# Patient Record
Sex: FEMALE | Race: WHITE | Employment: OTHER | URBAN - METROPOLITAN AREA
[De-identification: names, ages, dates, MRNs, and addresses within clinical notes are randomized per-mention and may not be internally consistent; named-entity substitution may affect disease eponyms.]

---

## 2017-11-06 LAB — PHQ9 SCORE: 4

## 2017-12-07 LAB — PHQ9 SCORE: 6

## 2018-09-10 ENCOUNTER — HOSPITAL ENCOUNTER (OUTPATIENT)
Dept: LAB | Facility: CLINIC | Age: 58
Discharge: HOME | End: 2018-09-10
Attending: FAMILY MEDICINE | Admitting: FAMILY MEDICINE
Payer: MEDICARE

## 2018-09-10 LAB
ABO + RH BLD: NORMAL
ABO + RH BLD: NORMAL
BLD GP AB SCN SERPL QL: NORMAL
BLOOD BANK CMNT PATIENT-IMP: NORMAL
BLOOD BANK CMNT PATIENT-IMP: NORMAL
SPECIMEN EXP DATE BLD: NORMAL

## 2018-09-10 PROCEDURE — 36415 COLL VENOUS BLD VENIPUNCTURE: CPT | Performed by: FAMILY MEDICINE

## 2018-09-10 PROCEDURE — 86900 BLOOD TYPING SEROLOGIC ABO: CPT | Performed by: FAMILY MEDICINE

## 2018-09-10 PROCEDURE — 86901 BLOOD TYPING SEROLOGIC RH(D): CPT | Performed by: FAMILY MEDICINE

## 2018-09-10 PROCEDURE — 86850 RBC ANTIBODY SCREEN: CPT | Performed by: FAMILY MEDICINE

## 2018-09-20 RX ORDER — LEVOTHYROXINE SODIUM 88 UG/1
88 TABLET ORAL DAILY
COMMUNITY

## 2018-09-20 RX ORDER — FAMOTIDINE 20 MG
1000 TABLET ORAL DAILY
COMMUNITY

## 2018-09-20 RX ORDER — GABAPENTIN 300 MG/1
300 CAPSULE ORAL 2 TIMES DAILY
COMMUNITY
End: 2019-11-18

## 2018-09-20 RX ORDER — CITALOPRAM HYDROBROMIDE 20 MG/1
20 TABLET ORAL EVERY EVENING
COMMUNITY

## 2018-09-20 RX ORDER — OXYBUTYNIN CHLORIDE 15 MG/1
15 TABLET, EXTENDED RELEASE ORAL EVERY MORNING
COMMUNITY

## 2018-09-20 RX ORDER — LAMOTRIGINE 200 MG/1
100 TABLET ORAL DAILY
COMMUNITY

## 2018-09-20 RX ORDER — DIVALPROEX SODIUM 500 MG/1
500 TABLET, DELAYED RELEASE ORAL AT BEDTIME
COMMUNITY

## 2018-09-20 RX ORDER — QUETIAPINE FUMARATE 100 MG/1
50 TABLET, FILM COATED ORAL AT BEDTIME
COMMUNITY

## 2018-09-20 RX ORDER — LOVASTATIN 40 MG
40 TABLET ORAL AT BEDTIME
COMMUNITY

## 2018-09-20 RX ORDER — THIOTHIXENE 1 MG/1
5 CAPSULE ORAL AT BEDTIME
COMMUNITY

## 2018-09-20 NOTE — OR NURSING
I called pt with the pre-op call and she was very concerned re: insurance coverage.. I was unsuccessful  Connecting  to [facility] financial services x 2 (busy signal) I called Dr Bonilla office and spoke with Pinyk?, she will follow up with the pt.

## 2018-09-21 ENCOUNTER — ANESTHESIA EVENT (OUTPATIENT)
Dept: SURGERY | Facility: CLINIC | Age: 58
DRG: 164 | End: 2018-09-21
Payer: MEDICARE

## 2018-09-21 ENCOUNTER — HOSPITAL ENCOUNTER (INPATIENT)
Facility: CLINIC | Age: 58
LOS: 2 days | Discharge: HOME | DRG: 164 | End: 2018-09-23
Attending: THORACIC SURGERY (CARDIOTHORACIC VASCULAR SURGERY) | Admitting: THORACIC SURGERY (CARDIOTHORACIC VASCULAR SURGERY)
Payer: MEDICARE

## 2018-09-21 ENCOUNTER — ANESTHESIA (OUTPATIENT)
Dept: SURGERY | Facility: CLINIC | Age: 58
DRG: 164 | End: 2018-09-21
Payer: MEDICARE

## 2018-09-21 ENCOUNTER — APPOINTMENT (OUTPATIENT)
Dept: GENERAL RADIOLOGY | Facility: CLINIC | Age: 58
DRG: 164 | End: 2018-09-21
Attending: THORACIC SURGERY (CARDIOTHORACIC VASCULAR SURGERY)
Payer: MEDICARE

## 2018-09-21 DIAGNOSIS — R91.1 NODULE OF LEFT LUNG: Primary | ICD-10-CM

## 2018-09-21 LAB
ABO + RH BLD: NORMAL
ABO + RH BLD: NORMAL
ANION GAP SERPL CALCULATED.3IONS-SCNC: 6 MMOL/L (ref 3–14)
BLD GP AB SCN SERPL QL: NORMAL
BLOOD BANK CMNT PATIENT-IMP: NORMAL
BUN SERPL-MCNC: 6 MG/DL (ref 7–30)
CALCIUM SERPL-MCNC: 8.9 MG/DL (ref 8.5–10.1)
CHLORIDE SERPL-SCNC: 107 MMOL/L (ref 94–109)
CO2 SERPL-SCNC: 28 MMOL/L (ref 20–32)
CREAT SERPL-MCNC: 0.71 MG/DL (ref 0.52–1.04)
ERYTHROCYTE [DISTWIDTH] IN BLOOD BY AUTOMATED COUNT: 13.1 % (ref 10–15)
GFR SERPL CREATININE-BSD FRML MDRD: 84 ML/MIN/1.7M2
GLUCOSE SERPL-MCNC: 93 MG/DL (ref 70–99)
HCT VFR BLD AUTO: 40.5 % (ref 35–47)
HGB BLD-MCNC: 13.7 G/DL (ref 11.7–15.7)
INR PPP: 0.95 (ref 0.86–1.14)
MCH RBC QN AUTO: 34.4 PG (ref 26.5–33)
MCHC RBC AUTO-ENTMCNC: 33.8 G/DL (ref 31.5–36.5)
MCV RBC AUTO: 102 FL (ref 78–100)
PLATELET # BLD AUTO: 284 10E9/L (ref 150–450)
POTASSIUM SERPL-SCNC: 4.2 MMOL/L (ref 3.4–5.3)
RBC # BLD AUTO: 3.98 10E12/L (ref 3.8–5.2)
SODIUM SERPL-SCNC: 141 MMOL/L (ref 133–144)
SPECIMEN EXP DATE BLD: NORMAL
WBC # BLD AUTO: 8.1 10E9/L (ref 4–11)

## 2018-09-21 PROCEDURE — 88331 PATH CONSLTJ SURG 1 BLK 1SPC: CPT | Mod: 26 | Performed by: THORACIC SURGERY (CARDIOTHORACIC VASCULAR SURGERY)

## 2018-09-21 PROCEDURE — 85027 COMPLETE CBC AUTOMATED: CPT | Performed by: THORACIC SURGERY (CARDIOTHORACIC VASCULAR SURGERY)

## 2018-09-21 PROCEDURE — 07B70ZX EXCISION OF THORAX LYMPHATIC, OPEN APPROACH, DIAGNOSTIC: ICD-10-PCS | Performed by: THORACIC SURGERY (CARDIOTHORACIC VASCULAR SURGERY)

## 2018-09-21 PROCEDURE — 40000886 ZZH STATISTIC STEP DOWN HRS DAY

## 2018-09-21 PROCEDURE — A9270 NON-COVERED ITEM OR SERVICE: HCPCS | Mod: GY | Performed by: PHYSICIAN ASSISTANT

## 2018-09-21 PROCEDURE — 25000128 H RX IP 250 OP 636: Performed by: ANESTHESIOLOGY

## 2018-09-21 PROCEDURE — 25000128 H RX IP 250 OP 636: Performed by: NURSE ANESTHETIST, CERTIFIED REGISTERED

## 2018-09-21 PROCEDURE — 36000063 ZZH SURGERY LEVEL 4 EA 15 ADDTL MIN: Performed by: THORACIC SURGERY (CARDIOTHORACIC VASCULAR SURGERY)

## 2018-09-21 PROCEDURE — 86850 RBC ANTIBODY SCREEN: CPT | Performed by: THORACIC SURGERY (CARDIOTHORACIC VASCULAR SURGERY)

## 2018-09-21 PROCEDURE — A9270 NON-COVERED ITEM OR SERVICE: HCPCS | Mod: GY | Performed by: NURSE ANESTHETIST, CERTIFIED REGISTERED

## 2018-09-21 PROCEDURE — 88307 TISSUE EXAM BY PATHOLOGIST: CPT | Performed by: THORACIC SURGERY (CARDIOTHORACIC VASCULAR SURGERY)

## 2018-09-21 PROCEDURE — 40000986 XR CHEST PORT 1 VW

## 2018-09-21 PROCEDURE — 82565 ASSAY OF CREATININE: CPT | Performed by: PHYSICIAN ASSISTANT

## 2018-09-21 PROCEDURE — 25000128 H RX IP 250 OP 636: Performed by: THORACIC SURGERY (CARDIOTHORACIC VASCULAR SURGERY)

## 2018-09-21 PROCEDURE — 88309 TISSUE EXAM BY PATHOLOGIST: CPT | Performed by: THORACIC SURGERY (CARDIOTHORACIC VASCULAR SURGERY)

## 2018-09-21 PROCEDURE — 36415 COLL VENOUS BLD VENIPUNCTURE: CPT | Performed by: THORACIC SURGERY (CARDIOTHORACIC VASCULAR SURGERY)

## 2018-09-21 PROCEDURE — 86900 BLOOD TYPING SEROLOGIC ABO: CPT | Performed by: THORACIC SURGERY (CARDIOTHORACIC VASCULAR SURGERY)

## 2018-09-21 PROCEDURE — 0BTG0ZZ RESECTION OF LEFT UPPER LUNG LOBE, OPEN APPROACH: ICD-10-PCS | Performed by: THORACIC SURGERY (CARDIOTHORACIC VASCULAR SURGERY)

## 2018-09-21 PROCEDURE — 25000125 ZZHC RX 250: Performed by: NURSE ANESTHETIST, CERTIFIED REGISTERED

## 2018-09-21 PROCEDURE — 12000007 ZZH R&B INTERMEDIATE

## 2018-09-21 PROCEDURE — 99207 ZZC CONSULT E&M CHANGED TO INITIAL LEVEL: CPT | Performed by: PHYSICIAN ASSISTANT

## 2018-09-21 PROCEDURE — 40000885 ZZH STATISTIC STEP DOWN HRS EVENING

## 2018-09-21 PROCEDURE — 37000009 ZZH ANESTHESIA TECHNICAL FEE, EACH ADDTL 15 MIN: Performed by: THORACIC SURGERY (CARDIOTHORACIC VASCULAR SURGERY)

## 2018-09-21 PROCEDURE — 80048 BASIC METABOLIC PNL TOTAL CA: CPT | Performed by: THORACIC SURGERY (CARDIOTHORACIC VASCULAR SURGERY)

## 2018-09-21 PROCEDURE — 25000128 H RX IP 250 OP 636: Performed by: PHYSICIAN ASSISTANT

## 2018-09-21 PROCEDURE — 88305 TISSUE EXAM BY PATHOLOGIST: CPT | Performed by: THORACIC SURGERY (CARDIOTHORACIC VASCULAR SURGERY)

## 2018-09-21 PROCEDURE — 27210794 ZZH OR GENERAL SUPPLY STERILE: Performed by: THORACIC SURGERY (CARDIOTHORACIC VASCULAR SURGERY)

## 2018-09-21 PROCEDURE — 99222 1ST HOSP IP/OBS MODERATE 55: CPT | Performed by: PHYSICIAN ASSISTANT

## 2018-09-21 PROCEDURE — 88307 TISSUE EXAM BY PATHOLOGIST: CPT | Mod: 26 | Performed by: THORACIC SURGERY (CARDIOTHORACIC VASCULAR SURGERY)

## 2018-09-21 PROCEDURE — 25000125 ZZHC RX 250: Performed by: PHYSICIAN ASSISTANT

## 2018-09-21 PROCEDURE — 88309 TISSUE EXAM BY PATHOLOGIST: CPT | Mod: 26 | Performed by: THORACIC SURGERY (CARDIOTHORACIC VASCULAR SURGERY)

## 2018-09-21 PROCEDURE — 36000093 ZZH SURGERY LEVEL 4 1ST 30 MIN: Performed by: THORACIC SURGERY (CARDIOTHORACIC VASCULAR SURGERY)

## 2018-09-21 PROCEDURE — 25000132 ZZH RX MED GY IP 250 OP 250 PS 637: Mod: GY | Performed by: PHYSICIAN ASSISTANT

## 2018-09-21 PROCEDURE — 25000132 ZZH RX MED GY IP 250 OP 250 PS 637: Mod: GY | Performed by: NURSE ANESTHETIST, CERTIFIED REGISTERED

## 2018-09-21 PROCEDURE — 25000566 ZZH SEVOFLURANE, EA 15 MIN: Performed by: THORACIC SURGERY (CARDIOTHORACIC VASCULAR SURGERY)

## 2018-09-21 PROCEDURE — 27110038 ZZH RX 271: Performed by: THORACIC SURGERY (CARDIOTHORACIC VASCULAR SURGERY)

## 2018-09-21 PROCEDURE — 86901 BLOOD TYPING SEROLOGIC RH(D): CPT | Performed by: THORACIC SURGERY (CARDIOTHORACIC VASCULAR SURGERY)

## 2018-09-21 PROCEDURE — 37000008 ZZH ANESTHESIA TECHNICAL FEE, 1ST 30 MIN: Performed by: THORACIC SURGERY (CARDIOTHORACIC VASCULAR SURGERY)

## 2018-09-21 PROCEDURE — 40000170 ZZH STATISTIC PRE-PROCEDURE ASSESSMENT II: Performed by: THORACIC SURGERY (CARDIOTHORACIC VASCULAR SURGERY)

## 2018-09-21 PROCEDURE — 71000013 ZZH RECOVERY PHASE 1 LEVEL 1 EA ADDTL HR: Performed by: THORACIC SURGERY (CARDIOTHORACIC VASCULAR SURGERY)

## 2018-09-21 PROCEDURE — 85610 PROTHROMBIN TIME: CPT | Performed by: THORACIC SURGERY (CARDIOTHORACIC VASCULAR SURGERY)

## 2018-09-21 PROCEDURE — 25000125 ZZHC RX 250: Performed by: THORACIC SURGERY (CARDIOTHORACIC VASCULAR SURGERY)

## 2018-09-21 PROCEDURE — 71000012 ZZH RECOVERY PHASE 1 LEVEL 1 FIRST HR: Performed by: THORACIC SURGERY (CARDIOTHORACIC VASCULAR SURGERY)

## 2018-09-21 PROCEDURE — 88305 TISSUE EXAM BY PATHOLOGIST: CPT | Mod: 26 | Performed by: THORACIC SURGERY (CARDIOTHORACIC VASCULAR SURGERY)

## 2018-09-21 PROCEDURE — 88331 PATH CONSLTJ SURG 1 BLK 1SPC: CPT | Performed by: THORACIC SURGERY (CARDIOTHORACIC VASCULAR SURGERY)

## 2018-09-21 RX ORDER — CITALOPRAM HYDROBROMIDE 20 MG/1
20 TABLET ORAL EVERY EVENING
Status: DISCONTINUED | OUTPATIENT
Start: 2018-09-21 | End: 2018-09-23 | Stop reason: HOSPADM

## 2018-09-21 RX ORDER — GABAPENTIN 300 MG/1
300 CAPSULE ORAL 2 TIMES DAILY
Status: DISCONTINUED | OUTPATIENT
Start: 2018-09-21 | End: 2018-09-23 | Stop reason: HOSPADM

## 2018-09-21 RX ORDER — LIDOCAINE HYDROCHLORIDE 20 MG/ML
INJECTION, SOLUTION INFILTRATION; PERINEURAL PRN
Status: DISCONTINUED | OUTPATIENT
Start: 2018-09-21 | End: 2018-09-21

## 2018-09-21 RX ORDER — DEXAMETHASONE SODIUM PHOSPHATE 4 MG/ML
INJECTION, SOLUTION INTRA-ARTICULAR; INTRALESIONAL; INTRAMUSCULAR; INTRAVENOUS; SOFT TISSUE PRN
Status: DISCONTINUED | OUTPATIENT
Start: 2018-09-21 | End: 2018-09-21

## 2018-09-21 RX ORDER — CEFAZOLIN SODIUM 2 G/100ML
2 INJECTION, SOLUTION INTRAVENOUS
Status: COMPLETED | OUTPATIENT
Start: 2018-09-21 | End: 2018-09-21

## 2018-09-21 RX ORDER — LIDOCAINE 40 MG/G
CREAM TOPICAL
Status: DISCONTINUED | OUTPATIENT
Start: 2018-09-21 | End: 2018-09-23 | Stop reason: HOSPADM

## 2018-09-21 RX ORDER — FENTANYL CITRATE 50 UG/ML
25-50 INJECTION, SOLUTION INTRAMUSCULAR; INTRAVENOUS
Status: DISCONTINUED | OUTPATIENT
Start: 2018-09-21 | End: 2018-09-21 | Stop reason: HOSPADM

## 2018-09-21 RX ORDER — NEOSTIGMINE METHYLSULFATE 1 MG/ML
VIAL (ML) INJECTION PRN
Status: DISCONTINUED | OUTPATIENT
Start: 2018-09-21 | End: 2018-09-21

## 2018-09-21 RX ORDER — ONDANSETRON 4 MG/1
4 TABLET, ORALLY DISINTEGRATING ORAL EVERY 30 MIN PRN
Status: DISCONTINUED | OUTPATIENT
Start: 2018-09-21 | End: 2018-09-21 | Stop reason: HOSPADM

## 2018-09-21 RX ORDER — DIVALPROEX SODIUM 125 MG/1
500 CAPSULE, COATED PELLETS ORAL AT BEDTIME
Status: DISCONTINUED | OUTPATIENT
Start: 2018-09-21 | End: 2018-09-23 | Stop reason: HOSPADM

## 2018-09-21 RX ORDER — PROCHLORPERAZINE MALEATE 10 MG
10 TABLET ORAL EVERY 6 HOURS PRN
Status: DISCONTINUED | OUTPATIENT
Start: 2018-09-21 | End: 2018-09-23 | Stop reason: HOSPADM

## 2018-09-21 RX ORDER — SODIUM CHLORIDE, SODIUM LACTATE, POTASSIUM CHLORIDE, CALCIUM CHLORIDE 600; 310; 30; 20 MG/100ML; MG/100ML; MG/100ML; MG/100ML
INJECTION, SOLUTION INTRAVENOUS CONTINUOUS
Status: DISCONTINUED | OUTPATIENT
Start: 2018-09-21 | End: 2018-09-21 | Stop reason: HOSPADM

## 2018-09-21 RX ORDER — BUPIVACAINE HYDROCHLORIDE 5 MG/ML
INJECTION, SOLUTION PERINEURAL PRN
Status: DISCONTINUED | OUTPATIENT
Start: 2018-09-21 | End: 2018-09-21 | Stop reason: HOSPADM

## 2018-09-21 RX ORDER — GLYCOPYRROLATE 0.2 MG/ML
INJECTION, SOLUTION INTRAMUSCULAR; INTRAVENOUS PRN
Status: DISCONTINUED | OUTPATIENT
Start: 2018-09-21 | End: 2018-09-21

## 2018-09-21 RX ORDER — SODIUM CHLORIDE, SODIUM LACTATE, POTASSIUM CHLORIDE, CALCIUM CHLORIDE 600; 310; 30; 20 MG/100ML; MG/100ML; MG/100ML; MG/100ML
INJECTION, SOLUTION INTRAVENOUS CONTINUOUS PRN
Status: DISCONTINUED | OUTPATIENT
Start: 2018-09-21 | End: 2018-09-21

## 2018-09-21 RX ORDER — THIOTHIXENE 5 MG/1
5 CAPSULE ORAL AT BEDTIME
Status: DISCONTINUED | OUTPATIENT
Start: 2018-09-21 | End: 2018-09-23 | Stop reason: HOSPADM

## 2018-09-21 RX ORDER — MAGNESIUM HYDROXIDE 1200 MG/15ML
LIQUID ORAL PRN
Status: DISCONTINUED | OUTPATIENT
Start: 2018-09-21 | End: 2018-09-21 | Stop reason: HOSPADM

## 2018-09-21 RX ORDER — AMOXICILLIN 250 MG
2 CAPSULE ORAL 2 TIMES DAILY
Status: DISCONTINUED | OUTPATIENT
Start: 2018-09-21 | End: 2018-09-23 | Stop reason: HOSPADM

## 2018-09-21 RX ORDER — ONDANSETRON 2 MG/ML
INJECTION INTRAMUSCULAR; INTRAVENOUS PRN
Status: DISCONTINUED | OUTPATIENT
Start: 2018-09-21 | End: 2018-09-21

## 2018-09-21 RX ORDER — ACETAMINOPHEN 325 MG/1
975 TABLET ORAL EVERY 8 HOURS
Status: DISCONTINUED | OUTPATIENT
Start: 2018-09-21 | End: 2018-09-23 | Stop reason: HOSPADM

## 2018-09-21 RX ORDER — POLYETHYLENE GLYCOL 3350 17 G/17G
17 POWDER, FOR SOLUTION ORAL DAILY PRN
Status: DISCONTINUED | OUTPATIENT
Start: 2018-09-21 | End: 2018-09-23 | Stop reason: HOSPADM

## 2018-09-21 RX ORDER — HYDROMORPHONE HYDROCHLORIDE 2 MG/1
2-4 TABLET ORAL
Status: DISCONTINUED | OUTPATIENT
Start: 2018-09-21 | End: 2018-09-23 | Stop reason: HOSPADM

## 2018-09-21 RX ORDER — HYDROMORPHONE HYDROCHLORIDE 1 MG/ML
.3-.5 INJECTION, SOLUTION INTRAMUSCULAR; INTRAVENOUS; SUBCUTANEOUS EVERY 5 MIN PRN
Status: DISCONTINUED | OUTPATIENT
Start: 2018-09-21 | End: 2018-09-21 | Stop reason: HOSPADM

## 2018-09-21 RX ORDER — AMOXICILLIN 250 MG
1 CAPSULE ORAL 2 TIMES DAILY PRN
Status: DISCONTINUED | OUTPATIENT
Start: 2018-09-21 | End: 2018-09-23 | Stop reason: HOSPADM

## 2018-09-21 RX ORDER — DEXTROSE, SODIUM CHLORIDE, SODIUM LACTATE, POTASSIUM CHLORIDE, AND CALCIUM CHLORIDE 5; .6; .31; .03; .02 G/100ML; G/100ML; G/100ML; G/100ML; G/100ML
1000 INJECTION, SOLUTION INTRAVENOUS CONTINUOUS
Status: DISCONTINUED | OUTPATIENT
Start: 2018-09-21 | End: 2018-09-21 | Stop reason: ALTCHOICE

## 2018-09-21 RX ORDER — NITROGLYCERIN 0.4 MG/1
0.4 TABLET SUBLINGUAL EVERY 5 MIN PRN
Status: DISCONTINUED | OUTPATIENT
Start: 2018-09-21 | End: 2018-09-23 | Stop reason: HOSPADM

## 2018-09-21 RX ORDER — NALOXONE HYDROCHLORIDE 0.4 MG/ML
.1-.4 INJECTION, SOLUTION INTRAMUSCULAR; INTRAVENOUS; SUBCUTANEOUS
Status: DISCONTINUED | OUTPATIENT
Start: 2018-09-21 | End: 2018-09-21

## 2018-09-21 RX ORDER — FENTANYL CITRATE 50 UG/ML
INJECTION, SOLUTION INTRAMUSCULAR; INTRAVENOUS PRN
Status: DISCONTINUED | OUTPATIENT
Start: 2018-09-21 | End: 2018-09-21

## 2018-09-21 RX ORDER — ALBUTEROL SULFATE 90 UG/1
AEROSOL, METERED RESPIRATORY (INHALATION) PRN
Status: DISCONTINUED | OUTPATIENT
Start: 2018-09-21 | End: 2018-09-21

## 2018-09-21 RX ORDER — PROPOFOL 10 MG/ML
INJECTION, EMULSION INTRAVENOUS PRN
Status: DISCONTINUED | OUTPATIENT
Start: 2018-09-21 | End: 2018-09-21

## 2018-09-21 RX ORDER — DIVALPROEX SODIUM 500 MG/1
500 TABLET, DELAYED RELEASE ORAL AT BEDTIME
Status: DISCONTINUED | OUTPATIENT
Start: 2018-09-21 | End: 2018-09-21

## 2018-09-21 RX ORDER — QUETIAPINE FUMARATE 50 MG/1
50 TABLET, FILM COATED ORAL AT BEDTIME
Status: DISCONTINUED | OUTPATIENT
Start: 2018-09-21 | End: 2018-09-23 | Stop reason: HOSPADM

## 2018-09-21 RX ORDER — LEVOTHYROXINE SODIUM 88 UG/1
88 TABLET ORAL DAILY
Status: DISCONTINUED | OUTPATIENT
Start: 2018-09-21 | End: 2018-09-23 | Stop reason: HOSPADM

## 2018-09-21 RX ORDER — LOVASTATIN 20 MG
40 TABLET ORAL AT BEDTIME
Status: DISCONTINUED | OUTPATIENT
Start: 2018-09-21 | End: 2018-09-23 | Stop reason: HOSPADM

## 2018-09-21 RX ORDER — KETOROLAC TROMETHAMINE 30 MG/ML
30 INJECTION, SOLUTION INTRAMUSCULAR; INTRAVENOUS EVERY 6 HOURS
Status: DISPENSED | OUTPATIENT
Start: 2018-09-21 | End: 2018-09-22

## 2018-09-21 RX ORDER — ACETAMINOPHEN 325 MG/1
650 TABLET ORAL EVERY 4 HOURS PRN
Status: DISCONTINUED | OUTPATIENT
Start: 2018-09-24 | End: 2018-09-23 | Stop reason: HOSPADM

## 2018-09-21 RX ORDER — HYDROMORPHONE HYDROCHLORIDE 1 MG/ML
.3-.5 INJECTION, SOLUTION INTRAMUSCULAR; INTRAVENOUS; SUBCUTANEOUS
Status: DISCONTINUED | OUTPATIENT
Start: 2018-09-21 | End: 2018-09-23 | Stop reason: HOSPADM

## 2018-09-21 RX ORDER — CEFAZOLIN SODIUM 1 G/3ML
1 INJECTION, POWDER, FOR SOLUTION INTRAMUSCULAR; INTRAVENOUS SEE ADMIN INSTRUCTIONS
Status: DISCONTINUED | OUTPATIENT
Start: 2018-09-21 | End: 2018-09-21 | Stop reason: HOSPADM

## 2018-09-21 RX ORDER — LAMOTRIGINE 100 MG/1
100 TABLET ORAL DAILY
Status: DISCONTINUED | OUTPATIENT
Start: 2018-09-21 | End: 2018-09-23 | Stop reason: HOSPADM

## 2018-09-21 RX ORDER — DIPHENHYDRAMINE HCL 25 MG
25 CAPSULE ORAL EVERY 6 HOURS PRN
Status: DISCONTINUED | OUTPATIENT
Start: 2018-09-21 | End: 2018-09-23 | Stop reason: HOSPADM

## 2018-09-21 RX ORDER — DIPHENHYDRAMINE HYDROCHLORIDE 50 MG/ML
25 INJECTION INTRAMUSCULAR; INTRAVENOUS EVERY 6 HOURS PRN
Status: DISCONTINUED | OUTPATIENT
Start: 2018-09-21 | End: 2018-09-23 | Stop reason: HOSPADM

## 2018-09-21 RX ORDER — AMOXICILLIN 250 MG
1 CAPSULE ORAL 2 TIMES DAILY
Status: DISCONTINUED | OUTPATIENT
Start: 2018-09-21 | End: 2018-09-23 | Stop reason: HOSPADM

## 2018-09-21 RX ORDER — CALCIUM CARBONATE 500 MG/1
1000 TABLET, CHEWABLE ORAL 4 TIMES DAILY PRN
Status: DISCONTINUED | OUTPATIENT
Start: 2018-09-21 | End: 2018-09-23 | Stop reason: HOSPADM

## 2018-09-21 RX ORDER — ONDANSETRON 2 MG/ML
4 INJECTION INTRAMUSCULAR; INTRAVENOUS EVERY 30 MIN PRN
Status: DISCONTINUED | OUTPATIENT
Start: 2018-09-21 | End: 2018-09-21 | Stop reason: HOSPADM

## 2018-09-21 RX ORDER — BISACODYL 10 MG
10 SUPPOSITORY, RECTAL RECTAL DAILY PRN
Status: DISCONTINUED | OUTPATIENT
Start: 2018-09-21 | End: 2018-09-23 | Stop reason: HOSPADM

## 2018-09-21 RX ORDER — ONDANSETRON 2 MG/ML
4 INJECTION INTRAMUSCULAR; INTRAVENOUS EVERY 6 HOURS PRN
Status: DISCONTINUED | OUTPATIENT
Start: 2018-09-21 | End: 2018-09-23 | Stop reason: HOSPADM

## 2018-09-21 RX ORDER — ONDANSETRON 4 MG/1
4 TABLET, ORALLY DISINTEGRATING ORAL EVERY 6 HOURS PRN
Status: DISCONTINUED | OUTPATIENT
Start: 2018-09-21 | End: 2018-09-23 | Stop reason: HOSPADM

## 2018-09-21 RX ORDER — NALOXONE HYDROCHLORIDE 0.4 MG/ML
.1-.4 INJECTION, SOLUTION INTRAMUSCULAR; INTRAVENOUS; SUBCUTANEOUS
Status: DISCONTINUED | OUTPATIENT
Start: 2018-09-21 | End: 2018-09-23 | Stop reason: HOSPADM

## 2018-09-21 RX ORDER — AMOXICILLIN 250 MG
2 CAPSULE ORAL 2 TIMES DAILY PRN
Status: DISCONTINUED | OUTPATIENT
Start: 2018-09-21 | End: 2018-09-23 | Stop reason: HOSPADM

## 2018-09-21 RX ORDER — GINSENG 100 MG
CAPSULE ORAL 3 TIMES DAILY
Status: DISCONTINUED | OUTPATIENT
Start: 2018-09-21 | End: 2018-09-23 | Stop reason: HOSPADM

## 2018-09-21 RX ORDER — SODIUM CHLORIDE 9 MG/ML
INJECTION, SOLUTION INTRAVENOUS CONTINUOUS
Status: DISCONTINUED | OUTPATIENT
Start: 2018-09-21 | End: 2018-09-22

## 2018-09-21 RX ORDER — VECURONIUM BROMIDE 1 MG/ML
INJECTION, POWDER, LYOPHILIZED, FOR SOLUTION INTRAVENOUS PRN
Status: DISCONTINUED | OUTPATIENT
Start: 2018-09-21 | End: 2018-09-21

## 2018-09-21 RX ADMIN — FENTANYL CITRATE 100 MCG: 50 INJECTION, SOLUTION INTRAMUSCULAR; INTRAVENOUS at 07:35

## 2018-09-21 RX ADMIN — FENTANYL CITRATE 50 MCG: 50 INJECTION, SOLUTION INTRAMUSCULAR; INTRAVENOUS at 07:56

## 2018-09-21 RX ADMIN — GABAPENTIN 300 MG: 300 CAPSULE ORAL at 20:26

## 2018-09-21 RX ADMIN — DIVALPROEX SODIUM 500 MG: 125 CAPSULE, COATED PELLETS ORAL at 21:09

## 2018-09-21 RX ADMIN — ROCURONIUM BROMIDE 10 MG: 10 INJECTION INTRAVENOUS at 08:09

## 2018-09-21 RX ADMIN — LOVASTATIN 40 MG: 20 TABLET ORAL at 21:09

## 2018-09-21 RX ADMIN — ACETAMINOPHEN 975 MG: 325 TABLET, FILM COATED ORAL at 12:36

## 2018-09-21 RX ADMIN — KETOROLAC TROMETHAMINE 30 MG: 30 INJECTION, SOLUTION INTRAMUSCULAR at 16:14

## 2018-09-21 RX ADMIN — FAMOTIDINE 20 MG: 10 INJECTION, SOLUTION INTRAVENOUS at 23:33

## 2018-09-21 RX ADMIN — LIDOCAINE HYDROCHLORIDE 100 MG: 20 INJECTION, SOLUTION INFILTRATION; PERINEURAL at 07:35

## 2018-09-21 RX ADMIN — DEXMEDETOMIDINE HYDROCHLORIDE 8 MCG: 100 INJECTION, SOLUTION INTRAVENOUS at 07:59

## 2018-09-21 RX ADMIN — GLYCOPYRROLATE 0.6 MG: 0.2 INJECTION, SOLUTION INTRAMUSCULAR; INTRAVENOUS at 09:12

## 2018-09-21 RX ADMIN — GABAPENTIN 300 MG: 300 CAPSULE ORAL at 16:14

## 2018-09-21 RX ADMIN — RANITIDINE 150 MG: 150 TABLET ORAL at 16:14

## 2018-09-21 RX ADMIN — MIDAZOLAM 2 MG: 1 INJECTION INTRAMUSCULAR; INTRAVENOUS at 07:28

## 2018-09-21 RX ADMIN — CEFAZOLIN SODIUM 2 G: 2 INJECTION, SOLUTION INTRAVENOUS at 07:42

## 2018-09-21 RX ADMIN — Medication 0.5 MG: at 09:38

## 2018-09-21 RX ADMIN — Medication 0.3 MG: at 12:36

## 2018-09-21 RX ADMIN — VECURONIUM BROMIDE 2 MG: 1 INJECTION, POWDER, LYOPHILIZED, FOR SOLUTION INTRAVENOUS at 08:28

## 2018-09-21 RX ADMIN — FENTANYL CITRATE 50 MCG: 50 INJECTION, SOLUTION INTRAMUSCULAR; INTRAVENOUS at 09:22

## 2018-09-21 RX ADMIN — ROCURONIUM BROMIDE 40 MG: 10 INJECTION INTRAVENOUS at 07:35

## 2018-09-21 RX ADMIN — SODIUM CHLORIDE, POTASSIUM CHLORIDE, SODIUM LACTATE AND CALCIUM CHLORIDE: 600; 310; 30; 20 INJECTION, SOLUTION INTRAVENOUS at 07:41

## 2018-09-21 RX ADMIN — NEOSTIGMINE METHYLSULFATE 4 MG: 1 INJECTION, SOLUTION INTRAVENOUS at 09:12

## 2018-09-21 RX ADMIN — THIOTHIXENE 5 MG: 5 CAPSULE ORAL at 21:09

## 2018-09-21 RX ADMIN — SODIUM CHLORIDE: 9 INJECTION, SOLUTION INTRAVENOUS at 13:56

## 2018-09-21 RX ADMIN — Medication 0.5 MG: at 09:51

## 2018-09-21 RX ADMIN — SODIUM CHLORIDE, POTASSIUM CHLORIDE, SODIUM LACTATE AND CALCIUM CHLORIDE: 600; 310; 30; 20 INJECTION, SOLUTION INTRAVENOUS at 07:10

## 2018-09-21 RX ADMIN — ACETAMINOPHEN 975 MG: 325 TABLET, FILM COATED ORAL at 20:26

## 2018-09-21 RX ADMIN — HYDROMORPHONE HYDROCHLORIDE 0.25 MG: 1 INJECTION, SOLUTION INTRAMUSCULAR; INTRAVENOUS; SUBCUTANEOUS at 09:01

## 2018-09-21 RX ADMIN — PROPOFOL 160 MG: 10 INJECTION, EMULSION INTRAVENOUS at 07:35

## 2018-09-21 RX ADMIN — KETOROLAC TROMETHAMINE 30 MG: 30 INJECTION, SOLUTION INTRAMUSCULAR at 23:32

## 2018-09-21 RX ADMIN — DEXAMETHASONE SODIUM PHOSPHATE 4 MG: 4 INJECTION, SOLUTION INTRA-ARTICULAR; INTRALESIONAL; INTRAMUSCULAR; INTRAVENOUS; SOFT TISSUE at 07:42

## 2018-09-21 RX ADMIN — ONDANSETRON 4 MG: 2 INJECTION INTRAMUSCULAR; INTRAVENOUS at 08:52

## 2018-09-21 RX ADMIN — ALBUTEROL SULFATE 6 PUFF: 90 AEROSOL, METERED RESPIRATORY (INHALATION) at 08:57

## 2018-09-21 RX ADMIN — HYDROMORPHONE HYDROCHLORIDE 0.25 MG: 1 INJECTION, SOLUTION INTRAMUSCULAR; INTRAVENOUS; SUBCUTANEOUS at 09:22

## 2018-09-21 ASSESSMENT — COPD QUESTIONNAIRES: COPD: 1

## 2018-09-21 ASSESSMENT — LIFESTYLE VARIABLES: TOBACCO_USE: 1

## 2018-09-21 ASSESSMENT — ACTIVITIES OF DAILY LIVING (ADL)
ADLS_ACUITY_SCORE: 9
ADLS_ACUITY_SCORE: 10
ADLS_ACUITY_SCORE: 9

## 2018-09-21 NOTE — ANESTHESIA POSTPROCEDURE EVALUATION
Patient: Santa Yousif    Procedure(s):  LEFT THORACOTOMY,  LEFT UPPER LOBECTOMY,Mediastin lymph node disection  - Wound Class: II-Clean Contaminated   - Wound Class: II-Clean Contaminated    Diagnosis:LEFT UPPER LOBE LUNG NODULE   Diagnosis Additional Information: No value filed.    Anesthesia Type:  General, ETT    Note:  Anesthesia Post Evaluation    Patient location during evaluation: PACU  Patient participation: Able to fully participate in evaluation  Level of consciousness: awake and alert  Pain management: satisfactory to patient  Airway patency: patent  Cardiovascular status: acceptable and hemodynamically stable  Respiratory status: acceptable, unassisted and face mask  Hydration status: acceptable  PONV: none       Comments: O2 sats improving in PACU (upper 80's initially), HOB elevated and deep breathing improved to low-mid 90's.        Last vitals:  Vitals:    09/21/18 1015 09/21/18 1020 09/21/18 1030   BP: 110/77 110/57 101/54   Resp: 12 17 24   Temp:   36.1  C (97  F)   SpO2: 93% 95% 94%         Electronically Signed By: El Castillo DO  September 21, 2018  11:25 AM

## 2018-09-21 NOTE — PROGRESS NOTES
Admission medication history interview status for the 9/21/2018  admission is complete. See EPIC admission navigator for prior to admission medications     Medication history source reliability:Moderate    Medication history interview source(s):Family    Medication history resources (including written lists, pill bottles, clinic record):Sister, Raegan, mailed in her medication list prior to surgery.  Raegan also brought patient's med box with her- which we used to identify last doses.     Primary pharmacy.[facility]    Additional medication history information not noted on PTA med list :  Patient has not been taking her medication consistently. Her sister sets them up for her, but she is independent with taking them.  Patient states she often forgets.      Time spent in this activity: 60 minutes    Prior to Admission medications    Medication Sig Last Dose Taking? Auth Provider   ASPIRIN PO Take 81 mg by mouth daily 9/18/2018 at AM Yes Reported, Patient   Citalopram Hydrobromide (CELEXA PO) Take 20 mg by mouth every evening 9/19/2018 at PM Yes Reported, Patient   divalproex sodium delayed-release (DEPAKOTE) 500 MG DR tablet Take 500 mg by mouth At Bedtime (Dose cut into thirds- otherwise tablet goes straight to her colostomy bag) 9/19/2018 at PM Yes Reported, Patient   GABAPENTIN PO Take 300 mg by mouth 2 times daily 9/19/2018 at PM Yes Reported, Patient   LamoTRIgine (LAMICTAL PO) Take 100 mg by mouth daily (0.5 x 200 mg = 100 mg dose) 9/18/2018 at AM Yes Reported, Patient   Levothyroxine Sodium (SYNTHROID PO) Take 88 mcg by mouth daily 9/18/2018 at AM Yes Reported, Patient   LOVASTATIN PO Take 40 mg by mouth At Bedtime 9/19/2018 at PM Yes Reported, Patient   Oxybutynin Chloride (DITROPAN XL PO) Take 15 mg by mouth daily 9/18/2018 at AM Yes Reported, Patient   QUEtiapine Fumarate (SEROQUEL PO) Take 50 mg by mouth At Bedtime (0.5 x 100 mg = 50 mg dose) 9/19/2018 at PM Yes Reported, Patient   THIOTHIXENE PO Take 5 mg by  mouth At Bedtime 9/19/2018 at PM Yes Reported, Patient   VITAMIN D, CHOLECALCIFEROL, PO Take 1,000 Units by mouth daily 9/18/2018 at AM Yes Reported, Patient

## 2018-09-21 NOTE — IP AVS SNAPSHOT
MRN:[id number]                      After Visit Summary   9/21/2018    Santa Yousif    MRN: [id number]           Thank you!     Thank you for choosing [facility] for your care. Our goal is always to provide you with excellent care. Hearing back from our patients is one way we can continue to improve our services. Please take a few minutes to complete the written survey that you may receive in the mail after you visit with us. Thank you!        Patient Information     Date Of Birth          1960        Designated Caregiver       Most Recent Value    Caregiver    Will someone help with your care after discharge? yes    Name of designated caregiver Cherie/ sister    Phone number of caregiver [phone]    Caregiver address [address]  [place]      About your hospital stay     You were admitted on:  September 21, 2018 You last received care in the:  [place] [address] [facility]    You were discharged on:  September 23, 2018       Who to Call     For medical emergencies, please call 911.  For non-urgent questions about your medical care, please call your primary care provider or clinic, [phone]  For questions related to your surgery, please call your surgery clinic        Attending Provider     Provider Specialty    Mgiuel Jauregui MD Thoracic Diseases       Primary Care Provider Office Phone # Fax #    Claudia COURTNEY Pizano [phone]      Further instructions from your care team       [facility]  Discharge Orders & Follow-up Care  Video-Assisted Thoracoscopy or Thoracotomy    You already have your follow-up appointments scheduled for you:  [facility] go to [facility] for a chest x-ray at _____________________________. [facility] is located in the same building ([facility], [address] [place] [address]) as Dr. Jauregui' office. They are in [address].  Please then go for  "your post-operative follow-up appointment in Dr. Jauregui' office, on the second floor of the [facility], [address] at ____________. You will see either Romelia Isbell PA-C or Dr. Jauregui during your appointment.      Call Echo at Dr. Jaureugi  office at [phone] to make a return appointment with a chest x-ray on October 1st.  Your appointment will be with either Romelia Isbell PA-C or Analisa eWbb PA-C, or Dr. Jauregui.        A. Patient Care:  Call Dr Jauregui  office @ [phone] if you experience:  *Severe chills or a fever or 101 F or higher on two occasions  *Increased incisional pain that cannot be relieved with rest or pain medications  *Presence of unusual incisional or chest tube site drainage that is odorous, green or yellow in color, or if your incision is warm, red or swollen  *Coughing up bright red blood or greenish-yellow secretions  *Chest pain that gets worse with deep breathing or a significant increase in shortness of breath  *Inability to urinate or have a bowel movement  *New pain or swelling in your legs    In an emergency, call [phone] or have someone drive you to the nearest Emergency Department    Pain Relief:  You may have been given a prescription for narcotic pain medicine.  You may also take ibuprofen and acetaminophen either as a new prescription  or over the counter.  Recommended dosages are:  600 mg Ibuprofen every 6 hours as needed and 650 mg Acetaminophen every 4 hours as needed.  Many patients get good pain relief by \"staggering\" these medications.     Constipation:  Narcotic pain medication, general anesthesia, and time in the hospital with less activity than normal can all cause constipation. Please take a stool softener (what you have at home or one that was prescribed during hospital discharge, such as Senokot-S, docusate sodium, Miralax, Milk of Magnesia) while you are taking narcotics to prevent constipation. Stop taking the stool softener once you are done " taking narcotics or if you begin having loose stools/diarrhea. Please call our clinic nurse, Tara, at [phone] if you are not having success (not having BMs) with your current stool softener.     No driving while on narcotics.     Activity:  ___ No activity restrictions for a scope procedure/thoracoscopy  XXX No heavy lifting greater than 8-10 pounds on the operative side for 4-6 weeks for a thoracotomy    Wound Care:  *You should look at your incision each day and keep it clean while it heals  *Do not apply any creams, salves such as Bacitracin, or ointments on the incision while it is healing  * Steri strips (thin white paper strips) will be present on the incision(s) and they will peel off as your incision heals-- otherwise, they will be removed at your post-op appointment.    *Remove the dressings covering your chest tube site 48 hours after your discharge from the hospital. You may then shower.  Wash the incision and chest tube site(s) daily with soap and water. No bathing or immersing incision underwater for approximately 2 weeks or until the chest tube sites are completely healed.     Place a dry gauze dressing (and tape) over the chest tube site because it is normal to have some drainage for a few days after the chest tube is removed.  Do not be alarmed if a large amount of fluid drains (should be pink or yellow) either spontaneously or with coughing or exertion. If this happens, just place a larger dry gauze dressing over the chest tube site-- it will stop and scab over in about a week or so. Once the drainage stops, you can stop covering the chest tube site with a dressing. Call our office if the drainage is milky or green in color or foul-smelling.    B. Respiratory:  XXX Utilize Incentive spirometer and flutter valve/acapella (if you received one) 10 times in a row every few hours while awake for a few weeks after discharging home from the hospital    C. Activity:  It may take a few months to  "regain your normal energy level/stamina. It is important during your recovery to get regular physical activity:  *walk each day at a comfortable pace  *climb stairs as tolerated  *take some rest periods each day but try not to take too many long naps, as this can affect your sleep at night    D. Returning to Work:  Time away from work will depend on your situation. In general, you will need between 1-6 weeks to recover from surgery. Specific dates for returning to work can be discussed at your post-op appointments.       Directions for On-Q Pain Pump Removal:  1. Remove the dressing covering the catheter site.  2. Grasp the catheter close to the skin and gently pull on the catheter. It should be easy to remove and not painful. If it becomes hard to remove or stretches, then stop and call   office.  3. Do not cut or pull hard to remove the catheter.  4. After you remove the catheter, check the catheter tip for a black marking to ensure the entire catheter was removed. Call our office if you don't see the black marking.  5. Place a band-aid over the catheter site if needed.  6. Call our office is you have redness, warmth or excessive bleeding from the catheter site or if there is a large bruise or swollen area around the site.    Revised May 2018      Pending Results     Date and Time Order Name Status Description    9/21/2018 0803 Surgical pathology exam In process             Admission Information     Date & Time Provider Department Dept. Phone    9/21/2018 Miguel Jauregui MD [place] [address] [facility] [phone]      Your Vitals Were     Blood Pressure Pulse Temperature Respirations Height Weight    141/90 73 97.9  F (36.6  C) (Oral) 16 1.676 m (5' 6\") 75.8 kg (167 lb)    Pulse Oximetry BMI (Body Mass Index)                93% 26.95 kg/m2          MyChart Information     [facility] lets you send messages to your doctor, view your test results, renew your prescriptions, schedule " "appointments and more. To sign up, go to [email or web address] . Click on \"Log in\" on the left side of the screen, which will take you to the Welcome page. Then click on \"Sign up Now\" on the right side of the page.     You will be asked to enter the access code listed below, as well as some personal information. Please follow the directions to create your username and password.     Your access code is: [email or web address]  Expires: 2018  1:45 PM     Your access code will  in 90 days. If you need help or a new code, please call your [facility] clinic or [phone].        Care EveryWhere ID     This is your Care EveryWhere ID. This could be used by other organizations to access your [facility] medical records  [id number]        Equal Access to Services     ELIZABETH RUCKER : Otis Mccall, breanna wolfe, christina ogden, garima villaseñor . So [facility] [phone].    ATENCIÓN: Si habla español, tiene a doe disposición servicios gratuitos de asistencia lingüística. Llame al [phone].    We comply with applicable federal civil rights laws and [place] laws. We do not discriminate on the basis of race, color, national origin, age, disability, sex, sexual orientation, or gender identity.               Review of your medicines      START taking        Dose / Directions    HYDROmorphone 2 MG tablet   Commonly known as:  DILAUDID        Dose:  2-4 mg   Take 1-2 tablets (2-4 mg) by mouth every 3 hours as needed   Quantity:  60 tablet   Refills:  0         CONTINUE these medicines which have NOT CHANGED        Dose / Directions    ASPIRIN PO        Dose:  81 mg   Take 81 mg by mouth daily   Refills:  0       CELEXA PO        Dose:  20 mg   Take 20 mg by mouth every evening   Refills:  0       DITROPAN XL PO        Dose:  15 mg   Take 15 mg by mouth daily   Refills:  0       divalproex sodium delayed-release 500 MG DR tablet   Commonly known as:  DEPAKOTE        Dose:  " 500 mg   Take 500 mg by mouth At Bedtime (Dose cut into thirds- otherwise tablet goes straight to her colostomy bag)   Refills:  0       GABAPENTIN PO        Dose:  300 mg   Take 300 mg by mouth 2 times daily   Refills:  0       LAMICTAL PO        Dose:  100 mg   Take 100 mg by mouth daily (0.5 x 200 mg = 100 mg dose)   Refills:  0       LOVASTATIN PO        Dose:  40 mg   Take 40 mg by mouth At Bedtime   Refills:  0       SEROQUEL PO        Dose:  50 mg   Take 50 mg by mouth At Bedtime (0.5 x 100 mg = 50 mg dose)   Refills:  0       SYNTHROID PO        Dose:  88 mcg   Take 88 mcg by mouth daily   Refills:  0       THIOTHIXENE PO        Dose:  5 mg   Take 5 mg by mouth At Bedtime   Refills:  0       VITAMIN D (CHOLECALCIFEROL) PO        Dose:  1000 Units   Take 1,000 Units by mouth daily   Refills:  0            Where to get your medicines      Some of these will need a paper prescription and others can be bought over the counter. Ask your nurse if you have questions.     Bring a paper prescription for each of these medications     HYDROmorphone 2 MG tablet                Protect others around you: Learn how to safely use, store and throw away your medicines at www.disposemymeds.org.        Information about OPIOIDS     PRESCRIPTION OPIOIDS: WHAT YOU NEED TO KNOW   We gave you an opioid (narcotic) pain medicine. It is important to manage your pain, but opioids are not always the best choice. You should first try all the other options your care team gave you. Take this medicine for as short a time (and as few doses) as possible.    Some activities can increase your pain, such as bandage changes or therapy sessions. It may help to take your pain medicine 30 to 60 minutes before these activities. Reduce your stress by getting enough sleep, working on hobbies you enjoy and practicing relaxation or meditation. Talk to your care team about ways to manage your pain beyond prescription opioids.    These medicines have  risks:    DO NOT drive when on new or higher doses of pain medicine. These medicines can affect your alertness and reaction times, and you could be arrested for driving under the influence (DUI). If you need to use opioids long-term, talk to your care team about driving.    DO NOT operate heavy machinery    DO NOT do any other dangerous activities while taking these medicines.    DO NOT drink any alcohol while taking these medicines.     If the opioid prescribed includes acetaminophen, DO NOT take with any other medicines that contain acetaminophen. Read all labels carefully. Look for the word  acetaminophen  or  Tylenol.  Ask your pharmacist if you have questions or are unsure.    You can get addicted to pain medicines, especially if you have a history of addiction (chemical, alcohol or substance dependence). Talk to your care team about ways to reduce this risk.    All opioids tend to cause constipation. Drink plenty of water and eat foods that have a lot of fiber, such as fruits, vegetables, prune juice, apple juice and high-fiber cereal. Take a laxative (Miralax, milk of magnesia, Colace, Senna) if you don t move your bowels at least every other day. Other side effects include upset stomach, sleepiness, dizziness, throwing up, tolerance (needing more of the medicine to have the same effect), physical dependence and slowed breathing.    Store your pills in a secure place, locked if possible. We will not replace any lost or stolen medicine. If you don t finish your medicine, please throw away (dispose) as directed by your pharmacist. The [place] Pollution Control Agency has more information about safe disposal: https://www.pca.[facility].[place].us/living-green/managing-unwanted-medications             Medication List: This is a list of all your medications and when to take them. Check marks below indicate your daily home schedule. Keep this list as a reference.      Medications           Morning Afternoon Evening Bedtime  As Needed    ASPIRIN PO   Take 81 mg by mouth daily                                   CELEXA PO   Take 20 mg by mouth every evening   Last time this was given:  20 mg on 9/22/2018  9:15 PM                                   DITROPAN XL PO   Take 15 mg by mouth daily   Last time this was given:  15 mg on 9/23/2018  9:00 AM                                   divalproex sodium delayed-release 500 MG DR tablet   Commonly known as:  DEPAKOTE   Take 500 mg by mouth At Bedtime (Dose cut into thirds- otherwise tablet goes straight to her colostomy bag)                                   GABAPENTIN PO   Take 300 mg by mouth 2 times daily   Last time this was given:  300 mg on 9/23/2018  9:00 AM                                      HYDROmorphone 2 MG tablet   Commonly known as:  DILAUDID   Take 1-2 tablets (2-4 mg) by mouth every 3 hours as needed   Last time this was given:  4 mg on 9/22/2018 11:56 PM                                   LAMICTAL PO   Take 100 mg by mouth daily (0.5 x 200 mg = 100 mg dose)   Last time this was given:  100 mg on 9/23/2018  9:00 AM                                   LOVASTATIN PO   Take 40 mg by mouth At Bedtime   Last time this was given:  40 mg on 9/22/2018 11:01 PM                                   SEROQUEL PO   Take 50 mg by mouth At Bedtime (0.5 x 100 mg = 50 mg dose)                                   SYNTHROID PO   Take 88 mcg by mouth daily   Last time this was given:  88 mcg on 9/23/2018  9:01 AM                                   THIOTHIXENE PO   Take 5 mg by mouth At Bedtime   Last time this was given:  5 mg on 9/22/2018 11:02 PM                                   VITAMIN D (CHOLECALCIFEROL) PO   Take 1,000 Units by mouth daily

## 2018-09-21 NOTE — OP NOTE
Procedure Date: 09/21/2018      SURGEON:  Miguel Jauregui MD      FIRST ASSISTANT:  Romelia Isbell PA-C      PREOPERATIVE DIAGNOSIS:  Left upper lobe lung nodule.      POSTOPERATIVE DIAGNOSIS:  Adenocarcinoma, left upper lobe lung.      PROCEDURE:   1.  Limited left thoracotomy.   2.  Left upper lobectomy with mediastinal lymph node dissection.      ANESTHESIA:  General with double endotracheal tube.      INDICATIONS:  A 57-year-old woman with a history of smoking.  She was found to have an enlarging hypermetabolic nodule measuring approximately 1 cm in the left upper lobe lung.  There is no evidence of zaire or distant disease.  Pulmonary function tests are adequate.  This nodule is highly suspicious for malignancy.  Options of diagnostic procedure were reviewed, elected to proceed with surgical resection as this nodule was not amenable to CT-guided biopsy.      DESCRIPTION OF PROCEDURE:  The patient was brought to the OR and placed in the supine position.  Under general anesthesia with double endotracheal tube, the patient was placed in a right lateral decubitus position.  The left chest was prepared and draped in the usual fashion using ChloraPrep.  Ventilation of left lung was discontinued.  The limited posterolateral thoracotomy was made sparing the serratus anterior muscle.  The pleural space was entered in the fifth intercostal space preserving the integrity of the rib.  The lung was carefully examined.  The nodule was easily identified and was highly suspicious for malignancy based on its gross appearance.  There was no pleural fluid or pleural nodule.  Hilum and mediastinum and diaphragm were normal.  The inferior pulmonary ligament was mobilized.  The hilum was dissected circumferentially.  Mediastinal lymph node dissection was performed, excising the posterior hilar, AP window, tracheobronchial angle and anterior hilar lymph nodes.  These were all submitted for permanent section.  The fissure was  entered, exposing the pulmonary artery.  The superior pulmonary vein was dissected circumferentially and stapled with application of [place] 35 mm vascular stapling device.        There were 3 separate branches of the pulmonary artery to the upper lobe.  Each branch was individually dissected circumferentially and stapled with application of [place] 35 mm vascular stapling device.  Then the origin of the left upper lobe bronchus was dissected circumferentially and stapled with application of [place] 45 gold stapling device, completing a left upper lobectomy.  Specimen was submitted for frozen section.  This revealed an adenocarcinoma as suspected.  Hemostasis was verified and was excellent.  On-Q catheters were placed.  30 mL Marcaine 0.5% without epinephrine was injected as intercostal blocks.  Through a separate stab wound, a 24-Chinese chest tube was placed with tip directed at apex posteriorly and sutured to skin with 2-0  silk suture.  Then the thoracotomy was closed with pericostal suture of Vicryl #1, running #1 Vicryl in muscular layer, running 2-0 Vicryl subcutaneous tissues, skin closed with Insorb staples.  Estimated blood loss was 20 mL.  Needle and sponge counts correct.      Romelia Isbell PA-C, was the first assistant during the procedure.  Her role as first assistant was essential and necessary in accomplishing the steps of the procedure as described above, providing exposure and retraction.         KIKE SHIPMAN MD             D: 2018   T: 2018   MT: SUHAS      Name:     JONE MCKEON   MRN:      [id number]        Account:        [id number]   :      1960           Procedure Date: 2018      Document: [id number]

## 2018-09-21 NOTE — CONSULTS
[facility]    Hospitalist Consultation    Date of Admission:  9/21/2018    Assessment & Plan   Santa Yousif is a 57 year old female who was admitted on 9/21/2018. I was asked to see the patient for comanagement.    POD #0 status post left thoracotomy of left upper lobectomy and mediastinal lymph node dissection due to left upper lobe lung nodule (likely adenocarcinoma):   Had screening chest CT due to heavy smoking history and found to have left upper lobe lung nodule suspicious for adenocarcinoma. Underwent surgery today, prelim path for adenocarcinoma.   -Postop cares per primary service thoracic surgery including IV fluids, antibiotics, analgesia, DVT prophylaxis  -Aggressive pulmonary toilet, incentive spirometer and Acapella flutter valve  -Chest tube cares per thoracic surgery  -Bowel regimen while on narcotics  -Resume PTA ASA 81 mg when able per primary service  -Wean oxygen as able  -Advance diet as tolerated  -follow up pathology and thoracic surgery recommendations    COPD  Current daily heavy smoker  Chronic heavy smoker with tobacco use disorder.  Now with left upper lobe lung nodule status post surgery today as above.  No history of inhaler utilization at home or oxygen prior to admission.    Dyslipidemia:   Preop lipid panel noted, . Resumed prior to admission lovastatin 40 mg nightly.  Dose adjustments per primary care provider.    Paranoid schizophrenia:   Resumed prior to admission Celexa 20 mg nightly, Lamictal 100 mg daily, Seroquel 50 mg nightly, Thiothixene 5 mg nightly, and Depakote 500 mg nightly (changed from tabs to sprinkles as she splits tabs for absorption).    Hypothyroidism: Resumed prior to admission levothyroxine 88 mcg daily    Anemia:   History of macrocytic anemia.  H&H within normal limits.  MCV and MCH mildly elevated.  Monitor hemoglobin postoperatively however minimal blood loss.  Follow-up with primary care for this.    Peripheral neuropathy:  Resumed prior to admission gabapentin 300 mg twice daily    Urge incontinence: Resumed prior to admission oxybutynin 50 mg daily    History of diverticulitis, anterolateral cutaneous fistula and ileostomy  Usual ileostomy cares and bowel regimen.  Follow-up as outpatient for this no acute issues.    DVT Prophylaxis: Defer to primary service, Lovenox  Code Status: Full Code    Disposition: Expected discharge per primary service.    This patient was discussed with Dr. Dhiraj Edouard of the Hospitalist Service who agrees with current plans as outlined above.    Ronda Kent PA-C  Hospitalist Physician Assistant  Pager: [phone]      Reason for Consult   Reason for consult: I was asked by thoracic surgery to evaluate this patient for medical comanagement.    Primary Care Physician   Harinder Schultz    Chief Complaint   Elective thoracic surgery    History is obtained from the patient, electronic health record and paper chart    History of Present Illness   Santa Yousif is a 57 year old female past medical history significant for 50-60-pack-year smoking history with current daily smoker, COPD, hypothyroidism, paranoid schizophrenia, and dyslipidemia who presents for elective left thoracotomy with left upper lobectomy and mediastinal lymph node dissection due to left upper lobe lung nodule, performed today 9/21/18 with Dr. Jauregui.  Patient had a lung cancer screening chest CT which revealed a left upper lobe lung nodule.  She is been a heavy smoker for the past 34 years since she recently cut back previously smoking several packs of cigarettes a day.  Patient received general ETT anesthesia minimal estimated blood loss 20 mL.  Specimen was taken for pathology revealing likely Adenocarcinoma as suspected, final pathology pending.  Chest tube placed at the apex left upper lobe.  Vital signs stable.  With O2 sats improving in the PACU postoperatively.     Preop labs including CMP, TSH and CBC collected  9/12/18 unremarkable other than BUN 5.  Noted to have postop pneumonitis acute pulmonary decompensation requiring supplemental oxygen and nebulizers after sigmoid colon resection in 2001.  However surgery since then not had recurrence of this.  Echo 9/14/18 showing EF 65%, no regional wall motion abnormalities, LV normal size and function.  No valvular heart disease identified.  Labs this morning unremarkable.  Preop H&H 13.7/40.5 UA unremarkable.  Postop chest x-ray showing chest tube in place with no pneumothorax.  Minimal subcu emphysema on the left with mild bibasilar atelectasis or infiltrate right worse than left.  Elevated right hemidiaphragm.  Vital signs stable with systolic blood pressure 100s-110.  Weaning oxygen down to 2 L and 95% room air.  Afebrile.    During my visit, patient was very groggy and tired postoperatively.  She confirms the history above.  She denies any current complaints other than pain at surgical site. Has had ileostomy pouch in place for several years, reports she manages this at home without issues. Chest tube in place with small air leak cough.  Hoarse voice which patient reports is chronic due to smoking.  Denies any recent weight or appetite changes.  No nausea or vomiting.  No chest pain or palpitations.  No shortness of breath.  No muscle weakness or fatigue.    Past Medical History    Current daily smoker  COPD  Dyslipidemia  Intermittent migraines  Paranoid schizophrenia  Hypothyroidism  Lung nodule  Hyperglycemia  Anemia  Ileostomy  Aortic heart murmur  Diverticulitis  Obesity  Ovarian cyst  Peripheral neuropathy  Urge incontinence  History of anterolateral cutaneous fistula    Past Surgical History   Open breast biopsy, right  Cervix cryosurgery  Closure of enterostomy with resection of colorectal anastomosis  Colonoscopy  Exploratory laparotomy with removal of infected mesh, and ileostomy, cecostomy  Temporary ileostomy  Incisional hernia repair, incarcerated  Sigmoid  resection with Cooper's procedure    Prior to Admission Medications   Prior to Admission Medications   Prescriptions Last Dose Informant Patient Reported? Taking?   ASPIRIN PO 2018 at AM Other Yes Yes   Sig: Take 81 mg by mouth daily   Citalopram Hydrobromide (CELEXA PO) 2018 at PM Other Yes Yes   Sig: Take 20 mg by mouth every evening   GABAPENTIN PO 2018 at PM Other Yes Yes   Sig: Take 300 mg by mouth 2 times daily   LOVASTATIN PO 2018 at PM Other Yes Yes   Sig: Take 40 mg by mouth At Bedtime   LamoTRIgine (LAMICTAL PO) 2018 at AM Other Yes Yes   Sig: Take 100 mg by mouth daily (0.5 x 200 mg = 100 mg dose)   Levothyroxine Sodium (SYNTHROID PO) 2018 at AM Other Yes Yes   Sig: Take 88 mcg by mouth daily   Oxybutynin Chloride (DITROPAN XL PO) 2018 at AM Other Yes Yes   Sig: Take 15 mg by mouth daily   QUEtiapine Fumarate (SEROQUEL PO) 2018 at PM Other Yes Yes   Sig: Take 50 mg by mouth At Bedtime (0.5 x 100 mg = 50 mg dose)   THIOTHIXENE PO 2018 at PM Other Yes Yes   Sig: Take 5 mg by mouth At Bedtime   VITAMIN D, CHOLECALCIFEROL, PO 2018 at AM Other Yes Yes   Sig: Take 1,000 Units by mouth daily   divalproex sodium delayed-release (DEPAKOTE) 500 MG DR tablet 2018 at PM Other Yes Yes   Sig: Take 500 mg by mouth At Bedtime (Dose cut into thirds- otherwise tablet goes straight to her colostomy bag)      Facility-Administered Medications: None     Allergies   Not on File    Social History   Patient drinks approximately 1-2 pots of coffee a day as well as smokes 1 pack per day.  She is a long-standing history of smoking 1-2 packs daily equal to about 51-60 pack years.  Is single and lives alone.  Does not drink alcohol.  She is unemployed and lives a sedentary lifestyle.  No illicit drug use.    Family History   Mother-  at age 72 of ovarian cancer  Father-COPD, peripheral vascular disease, pulmonary embolism  Sister- breast cancer  Maternal  grandmother-depression  Maternal grandfather-esophageal cancer  Paternal aunt-stroke and uterine cancer  Paternal aunt-colon cancer  Maternal uncle-acute myocardial infarction    Review of Systems   The 10 point Review of Systems is negative other than noted in the HPI or here.     Physical Exam   Temp: 97.1  F (36.2  C) Temp src: Oral BP: 116/68   Heart Rate: 77 Resp: 20 SpO2: 95 % O2 Device: Nasal cannula Oxygen Delivery: 2 LPM  Vital Signs with Ranges  Temp:  [96.4  F (35.8  C)-97.6  F (36.4  C)] 97.1  F (36.2  C)  Heart Rate:  [57-93] 77  Resp:  [12-24] 20  BP: (101-143)/() 116/68  SpO2:  [89 %-99 %] 95 %  167 lbs 0 oz    Constitutional: Awake, groggy and tired, cooperative, no apparent distress.  HEENT: Extraocular movements intact. Pupils equal round reactive to light. Oropharynx clear without exudates.  Respiratory: Clear to auscultation bilaterally at anterior fields, no wheezing, rales, or rhonchi. Lt upper anterior thorax with chest tube in place, dressing c/d/i. Draining serosanguinous fluid via chest tube.  Cardiovascular: Regular rate and rhythm, normal S1 and S2, and 3/6 systolic murmur at RUSB.  GI: Soft, non-distended, non-tender, normal bowel sounds. Ileostomy pouch in place with minimal stool.  Skin: No rashes, no cyanosis, no edema.  Musculoskeletal: No joint swelling, erythema or tenderness.  Neurologic: Cranial nerves 2-12 intact, normal strength and sensation.  Psychiatric: Alert, oriented to person, place and time, no obvious anxiety or depression.    Data   -Data reviewed today: All pertinent laboratory and imaging results from this encounter were reviewed. I personally reviewed no images or EKG's today.    Recent Labs  Lab 09/21/18  0625   WBC 8.1   HGB 13.7   *      INR 0.95      POTASSIUM 4.2   CHLORIDE 107   CO2 28   BUN 6*   CR 0.71   ANIONGAP 6   YOUSIF 8.9   GLC 93       Imaging:  Recent Results (from the past 24 hour(s))   XR Chest Port 1 View    Narrative     CHEST ONE VIEW SUPINE September 21, 2018 9:46 AM     HISTORY: Postoperative.    COMPARISON: None.      Impression    IMPRESSION: Left apical chest tube in place. No gross pneumothorax  evident in supine position. Minimal subcutaneous emphysema on the  left. Mild bibasilar atelectasis and/or infiltrate right worse than  left. Elevated right hemidiaphragm.    PHIL ANNA MD

## 2018-09-21 NOTE — OR NURSING
At 10:10, Dr. Castillo stopped at bedside to check on patient. O2 sats 89% at that time on 3L/NC. Per Dr. Castillo, ok to transfer patient to floor if maintaining O2 sats >90%. Patient repositioned in bed, HOB elevated to 30 degrees, O2 increased to 4L/NC. Patient now maintaining O2 sats 93-94% on 4L/NC.

## 2018-09-21 NOTE — BRIEF OP NOTE
[facility] Brief Operative Note    Pre-operative diagnosis: LEFT UPPER LOBE LUNG NODULE    Post-operative diagnosis left upper lobe lung nodule     Procedure: Left thoracotomy, left upper lobectomy, mediastinal lymph node dissection   Surgeon(s): Surgeon(s) and Role:     * Miguel Jauregui MD - Primary     * Romelia Isbell PA-C - Assisting   Estimated blood loss: 20 mL    Specimens:   ID Type Source Tests Collected by Time Destination   A : posterier hilar lymph nodes Tissue Lung SURGICAL PATHOLOGY EXAM Miguel Jauregui MD 9/21/2018  8:02 AM    B : tracheo bronchial angle lymph node Tissue Lung SURGICAL PATHOLOGY EXAM Miguel Jauregui MD 9/21/2018  8:09 AM    C : anterior hilar lymph node Tissue Lung SURGICAL PATHOLOGY EXAM Miguel Jauregui MD 9/21/2018  8:14 AM    D : A-P window nodes Tissue Lung SURGICAL PATHOLOGY EXAM Miguel Jauregui MD 9/21/2018  8:16 AM    E :  Tissue Lung, Left Upper Lobe SURGICAL PATHOLOGY EXAM Miguel Jauregui MD 9/21/2018  8:37 AM       Findings: Await final pathology and staging

## 2018-09-21 NOTE — IP AVS SNAPSHOT
[id number]    [address] [place] [address]    Phone:  [phone]                                       After Visit Summary   9/21/2018    Santa Yousif    MRN: [id number]           After Visit Summary Signature Page     I have received my discharge instructions, and my questions have been answered. I have discussed any challenges I see with this plan with the nurse or doctor.    ..........................................................................................................................................  Patient/Patient Representative Signature      ..........................................................................................................................................  Patient Representative Print Name and Relationship to Patient    ..................................................               ................................................  Date                                   Time    ..........................................................................................................................................  Reviewed by Signature/Title    ...................................................              ..............................................  Date                                               Time          22EPIC Rev 08/18

## 2018-09-21 NOTE — ANESTHESIA CARE TRANSFER NOTE
Patient: Santa Yousif    Procedure(s):  LEFT THORACOTOMY,  LEFT UPPER LOBECTOMY,Mediastin lymph node disection  - Wound Class: II-Clean Contaminated   - Wound Class: II-Clean Contaminated    Diagnosis: LEFT UPPER LOBE LUNG NODULE   Diagnosis Additional Information: No value filed.    Anesthesia Type:   General, ETT     Note:  Airway :Face Mask  Patient transferred to:PACU  Comments: To pacu bed 6. Vss. Fentanyl and dilaudid given for pain. Report given to RN assuming care of ptHandoff Report: Identifed the Patient, Identified the Reponsible Provider, Reviewed the pertinent medical history, Discussed the surgical course, Reviewed Intra-OP anesthesia mangement and issues during anesthesia, Set expectations for post-procedure period and Allowed opportunity for questions and acknowledgement of understanding      Vitals: (Last set prior to Anesthesia Care Transfer)    CRNA VITALS  9/21/2018 0850 - 9/21/2018 0933      9/21/2018             Pulse: 101    SpO2: 97 %    Resp Rate (observed): (!)  3                Electronically Signed By: СЕРГЕЙ Bragg CRNA  September 21, 2018  9:33 AM

## 2018-09-22 ENCOUNTER — APPOINTMENT (OUTPATIENT)
Dept: GENERAL RADIOLOGY | Facility: CLINIC | Age: 58
DRG: 164 | End: 2018-09-22
Attending: PHYSICIAN ASSISTANT
Payer: MEDICARE

## 2018-09-22 PROBLEM — F17.200 SMOKER: Status: ACTIVE | Noted: 2018-09-22

## 2018-09-22 PROBLEM — J44.9 COPD (CHRONIC OBSTRUCTIVE PULMONARY DISEASE) (H): Status: ACTIVE | Noted: 2018-09-22

## 2018-09-22 PROBLEM — Z93.2 STATUS POST ILEOSTOMY (H): Status: ACTIVE | Noted: 2018-09-22

## 2018-09-22 PROBLEM — F20.0 PARANOID SCHIZOPHRENIA (H): Status: ACTIVE | Noted: 2018-09-22

## 2018-09-22 PROBLEM — R01.1 HEART MURMUR: Status: ACTIVE | Noted: 2018-09-22

## 2018-09-22 LAB — GLUCOSE BLDC GLUCOMTR-MCNC: 112 MG/DL (ref 70–99)

## 2018-09-22 PROCEDURE — 25000125 ZZHC RX 250: Performed by: PHYSICIAN ASSISTANT

## 2018-09-22 PROCEDURE — 71045 X-RAY EXAM CHEST 1 VIEW: CPT

## 2018-09-22 PROCEDURE — 40000884 ZZH STATISTIC STEP DOWN HRS NIGHT

## 2018-09-22 PROCEDURE — 25000132 ZZH RX MED GY IP 250 OP 250 PS 637: Mod: GY | Performed by: PHYSICIAN ASSISTANT

## 2018-09-22 PROCEDURE — 00000146 ZZHCL STATISTIC GLUCOSE BY METER IP

## 2018-09-22 PROCEDURE — 40000275 ZZH STATISTIC RCP TIME EA 10 MIN

## 2018-09-22 PROCEDURE — A9270 NON-COVERED ITEM OR SERVICE: HCPCS | Mod: GY | Performed by: INTERNAL MEDICINE

## 2018-09-22 PROCEDURE — 25000128 H RX IP 250 OP 636: Performed by: PHYSICIAN ASSISTANT

## 2018-09-22 PROCEDURE — 12000007 ZZH R&B INTERMEDIATE

## 2018-09-22 PROCEDURE — 94799 UNLISTED PULMONARY SVC/PX: CPT

## 2018-09-22 PROCEDURE — 99232 SBSQ HOSP IP/OBS MODERATE 35: CPT | Performed by: INTERNAL MEDICINE

## 2018-09-22 PROCEDURE — A9270 NON-COVERED ITEM OR SERVICE: HCPCS | Mod: GY | Performed by: PHYSICIAN ASSISTANT

## 2018-09-22 PROCEDURE — 25000132 ZZH RX MED GY IP 250 OP 250 PS 637: Mod: GY | Performed by: INTERNAL MEDICINE

## 2018-09-22 RX ADMIN — DIVALPROEX SODIUM 500 MG: 125 CAPSULE, COATED PELLETS ORAL at 23:02

## 2018-09-22 RX ADMIN — ACETAMINOPHEN 975 MG: 325 TABLET, FILM COATED ORAL at 16:53

## 2018-09-22 RX ADMIN — NICOTINE POLACRILEX 2 MG: 2 GUM, CHEWING BUCCAL at 09:29

## 2018-09-22 RX ADMIN — HYDROMORPHONE HYDROCHLORIDE 4 MG: 2 TABLET ORAL at 23:56

## 2018-09-22 RX ADMIN — LOVASTATIN 40 MG: 20 TABLET ORAL at 23:01

## 2018-09-22 RX ADMIN — HYDROMORPHONE HYDROCHLORIDE 2 MG: 2 TABLET ORAL at 21:15

## 2018-09-22 RX ADMIN — KETOROLAC TROMETHAMINE 30 MG: 30 INJECTION, SOLUTION INTRAMUSCULAR at 06:09

## 2018-09-22 RX ADMIN — THIOTHIXENE 5 MG: 5 CAPSULE ORAL at 23:02

## 2018-09-22 RX ADMIN — HYDROMORPHONE HYDROCHLORIDE 2 MG: 2 TABLET ORAL at 16:53

## 2018-09-22 RX ADMIN — LEVOTHYROXINE SODIUM 88 MCG: 88 TABLET ORAL at 08:13

## 2018-09-22 RX ADMIN — GABAPENTIN 300 MG: 300 CAPSULE ORAL at 08:14

## 2018-09-22 RX ADMIN — LAMOTRIGINE 100 MG: 100 TABLET ORAL at 08:13

## 2018-09-22 RX ADMIN — ACETAMINOPHEN 975 MG: 325 TABLET, FILM COATED ORAL at 08:13

## 2018-09-22 RX ADMIN — BACITRACIN: 500 OINTMENT TOPICAL at 08:13

## 2018-09-22 RX ADMIN — ACETAMINOPHEN 975 MG: 325 TABLET, FILM COATED ORAL at 23:56

## 2018-09-22 RX ADMIN — ENOXAPARIN SODIUM 40 MG: 40 INJECTION SUBCUTANEOUS at 08:13

## 2018-09-22 RX ADMIN — GABAPENTIN 300 MG: 300 CAPSULE ORAL at 23:02

## 2018-09-22 RX ADMIN — SENNOSIDES AND DOCUSATE SODIUM 1 TABLET: 8.6; 5 TABLET ORAL at 08:13

## 2018-09-22 RX ADMIN — OXYBUTYNIN CHLORIDE 15 MG: 10 TABLET, EXTENDED RELEASE ORAL at 08:13

## 2018-09-22 RX ADMIN — NICOTINE POLACRILEX 2 MG: 2 GUM, CHEWING BUCCAL at 13:06

## 2018-09-22 RX ADMIN — CITALOPRAM HYDROBROMIDE 20 MG: 20 TABLET ORAL at 21:15

## 2018-09-22 ASSESSMENT — ACTIVITIES OF DAILY LIVING (ADL)
ADLS_ACUITY_SCORE: 12
ADLS_ACUITY_SCORE: 12
ADLS_ACUITY_SCORE: 10
ADLS_ACUITY_SCORE: 10
ADLS_ACUITY_SCORE: 12
ADLS_ACUITY_SCORE: 10

## 2018-09-22 NOTE — PLAN OF CARE
Problem: Lung Surgery (via Thoracotomy) (Adult)  Goal: Signs and Symptoms of Listed Potential Problems Will be Absent, Minimized or Managed (Lung Surgery)  Signs and symptoms of listed potential problems will be absent, minimized or managed by discharge/transition of care (reference Lung Surgery (via Thoracotomy) (Adult) CPG).   Outcome: Improving  Patient is alert and orientated, up with assistance of one, vital signs stable, pain managed with Toradol. Chest tube to  suction, no  Air leak or crepitus. Dressing CDI. Tele:

## 2018-09-22 NOTE — PLAN OF CARE
Problem: Patient Care Overview  Goal: Plan of Care/Patient Progress Review  Outcome: Improving  A&O x4 although very flat affect. VSS ex requiring 1L O2 via NC. Pulmonary hygiene promoted/completed. LS diminished at bases, coarse at times. Up in chair most of shift/ambulating assist 1, -750 with encouragement. Dry hacking non-productive cough. CT to suction, no AL/crep, dressing CDI. Thoracotomy site marked, some extension on shift but WNL. On Ques patent, taped, unclamped.  Pain well controlled with toradol and tylenol, avoiding narcotics when possible. PTA colostomy with stool in pouch, pink stoma. Denies nausea, tolerating regular diet.

## 2018-09-22 NOTE — PROGRESS NOTES
SPIRITUAL HEALTH SERVICES Progress Note  [facility]    Visited based on request.     Pt loved to talk and tell stories. 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 Pt talked about working at a resort for 20 years, her experiences with cancer, surgery, and her beto. Pt said she never reads the bible and should have a stronger beto. Pt said she enjoys preyer.     SH provided a caring and listening presence, reflective questions, and acknowledged the good time she has had and also some of her difficulties. SH encouraged Pt with contemplating her beto. SH provided prayer.     SH will visit based on request if needs arise.       Rudy Hernández  Chaplain Resident

## 2018-09-22 NOTE — PLAN OF CARE
Problem: Patient Care Overview  Goal: Plan of Care/Patient Progress Review  Outcome: Improving  Soft BP's, improved. CT to WS, to be clamped at midnight. Up with Ax1. Voiding. Colostomy, pt manages independently. Pain managed with dilaudid and scheduled tylenol

## 2018-09-22 NOTE — PROGRESS NOTES
THORACIC SURGERY POD # 1    Doing well  AVSS on RA  No air leak, no bleeding  Wound OK    CXR lung expanded    Discussed findings and procedure    CT to water seal  Clamp CT tonight  resp care ++  Ambulate x 8 today    KIKE SHIPMAN MD [facility] ONCOLOGY THORACIC SURGERY  CELL:  [phone]  OFFICE: [phone]

## 2018-09-22 NOTE — PROGRESS NOTES
[facility]    Hospitalist Progress Note    Assessment & Plan   Santa Yousif is a 57 year old female with paranoid Schizophrenia with COPD, who was admitted on 9/21/2018 for elective surgery for left upper lobe nodule, and smokes    Impression:   Principal Problem:    Nodule of left lung -- S/P DIANA lobectomy 9/21/18  Active Problems:    Smoker    Paranoid schizophrenia (H)    Status post ileostomy (H)    Heart murmur -- normal Echo 9/14/18    COPD (chronic obstructive pulmonary disease) (H)      Plan:  Doing well, can use her own nicotine lozenge if wanted.     DVT Prophylaxis: Pneumatic Compression Devices  Code Status: Full Code    Disposition: Expected discharge in 2 days once chest tube out.    Estevan Mcginnis MD  Pager [phone]  Cell Phone [phone]  Text Page (7am to 6pm)    Interval History   Reports 2 out of 10 chest pain, has left chest tube.     Physical Exam   Temp: 97.9  F (36.6  C) Temp src: Oral BP: 90/58 Pulse: 77 Heart Rate: 60 Resp: 20 SpO2: 96 % O2 Device: None (Room air) Oxygen Delivery: 1 LPM  Vitals:    09/21/18 0626   Weight: 75.8 kg (167 lb)     Vital Signs with Ranges  Temp:  [96.4  F (35.8  C)-98  F (36.7  C)] 97.9  F (36.6  C)  Pulse:  [77] 77  Heart Rate:  [54-93] 60  Resp:  [12-28] 20  BP: ()/() 90/58  SpO2:  [88 %-99 %] 96 %  I/O last 3 completed shifts:  In: 6390.75 [P.O.:3180; I.V.:2730.75; Other:480]  Out: 3380 [Urine:2450; Stool:350; Blood:20; Chest Tube:560]    # Pain Assessment:  Current Pain Score 9/22/2018   Patient currently in pain? yes   Pain score (0-10) 2   Pain location Incision   Pain descriptors Discomfort   - Santa is experiencing pain due to chest tube and surgery. Pain management was discussed and the plan was created in a collaborative fashion.  Santa's response to the current recommendations: engaged  - see orders    Constitutional: Awake, alert, cooperative, no apparent distress  Respiratory: slight bilateral  expiratory rhonchi  Cardiovascular: Regular rate and rhythm, normal S1 and S2, and no murmur noted  GI: Normal bowel sounds, soft, non-distended, non-tender  Extrem: No calf tenderness, no ankle edema  Neuro: Ox3, no focal motor or sensory deficits    Medications     - MEDICATION INSTRUCTIONS -       sodium chloride Stopped (09/22/18 0400)       acetaminophen  975 mg Oral Q8H     bacitracin   Topical TID     citalopram (celeXA) tablet 20 mg  20 mg Oral QPM     divalproex sodium delayed-release  500 mg Oral At Bedtime     enoxaparin  40 mg Subcutaneous Q24H     ranitidine  150 mg Oral Q12H    Or     famotidine  20 mg Intravenous Q12H     gabapentin (NEURONTIN) capsule 300 mg  300 mg Oral BID     ketorolac  30 mg Intravenous Q6H     lamoTRIgine (LaMICtal) tablet 100 mg  100 mg Oral Daily     levothyroxine (SYNTHROID/LEVOTHROID) tablet 88 mcg  88 mcg Oral Daily     lovastatin (MEVACOR) tablet 40 mg  40 mg Oral At Bedtime     oxybutynin (DITROPAN-XL) 24 hr tablet 15 mg  15 mg Oral Daily     QUEtiapine  50 mg Oral At Bedtime     senna-docusate  1 tablet Oral BID    Or     senna-docusate  2 tablet Oral BID     sodium chloride (PF)  3 mL Intracatheter Q8H     sodium chloride (PF)  3 mL Intracatheter Q8H     thiothixene (NAVANE) capsule 5 mg  5 mg Oral At Bedtime       Data     Recent Labs  Lab 09/21/18  0625   WBC 8.1   HGB 13.7   *      INR 0.95      POTASSIUM 4.2   CHLORIDE 107   CO2 28   BUN 6*   CR 0.71   ANIONGAP 6   YOUSIF 8.9   GLC 93       Imaging:   Recent Results (from the past 24 hour(s))   XR Chest Port 1 View    Narrative    CHEST ONE VIEW SUPINE September 21, 2018 9:46 AM     HISTORY: Postoperative.    COMPARISON: None.      Impression    IMPRESSION: Left apical chest tube in place. No gross pneumothorax  evident in supine position. Minimal subcutaneous emphysema on the  left. Mild bibasilar atelectasis and/or infiltrate right worse than  left. Elevated right hemidiaphragm.    PHIL ANNA  MD

## 2018-09-23 ENCOUNTER — APPOINTMENT (OUTPATIENT)
Dept: GENERAL RADIOLOGY | Facility: CLINIC | Age: 58
DRG: 164 | End: 2018-09-23
Attending: PHYSICIAN ASSISTANT
Payer: MEDICARE

## 2018-09-23 VITALS
DIASTOLIC BLOOD PRESSURE: 90 MMHG | HEIGHT: 66 IN | BODY MASS INDEX: 26.84 KG/M2 | TEMPERATURE: 97.9 F | WEIGHT: 167 LBS | RESPIRATION RATE: 16 BRPM | SYSTOLIC BLOOD PRESSURE: 141 MMHG | HEART RATE: 73 BPM | OXYGEN SATURATION: 93 %

## 2018-09-23 LAB — GLUCOSE BLDC GLUCOMTR-MCNC: 99 MG/DL (ref 70–99)

## 2018-09-23 PROCEDURE — 25000128 H RX IP 250 OP 636: Performed by: PHYSICIAN ASSISTANT

## 2018-09-23 PROCEDURE — 25000132 ZZH RX MED GY IP 250 OP 250 PS 637: Mod: GY | Performed by: PHYSICIAN ASSISTANT

## 2018-09-23 PROCEDURE — 71045 X-RAY EXAM CHEST 1 VIEW: CPT

## 2018-09-23 PROCEDURE — A9270 NON-COVERED ITEM OR SERVICE: HCPCS | Mod: GY | Performed by: PHYSICIAN ASSISTANT

## 2018-09-23 PROCEDURE — 00000146 ZZHCL STATISTIC GLUCOSE BY METER IP

## 2018-09-23 RX ORDER — HYDROMORPHONE HYDROCHLORIDE 2 MG/1
2-4 TABLET ORAL
Qty: 60 TABLET | Refills: 0 | Status: ON HOLD | OUTPATIENT
Start: 2018-09-23 | End: 2019-05-15

## 2018-09-23 RX ADMIN — LAMOTRIGINE 100 MG: 100 TABLET ORAL at 09:00

## 2018-09-23 RX ADMIN — LEVOTHYROXINE SODIUM 88 MCG: 88 TABLET ORAL at 09:01

## 2018-09-23 RX ADMIN — GABAPENTIN 300 MG: 300 CAPSULE ORAL at 09:00

## 2018-09-23 RX ADMIN — RANITIDINE 150 MG: 150 TABLET ORAL at 09:01

## 2018-09-23 RX ADMIN — OXYBUTYNIN CHLORIDE 15 MG: 10 TABLET, EXTENDED RELEASE ORAL at 09:00

## 2018-09-23 RX ADMIN — ENOXAPARIN SODIUM 40 MG: 40 INJECTION SUBCUTANEOUS at 09:01

## 2018-09-23 RX ADMIN — ACETAMINOPHEN 975 MG: 325 TABLET, FILM COATED ORAL at 09:00

## 2018-09-23 ASSESSMENT — ACTIVITIES OF DAILY LIVING (ADL)
ADLS_ACUITY_SCORE: 12
ADLS_ACUITY_SCORE: 12
ADLS_ACUITY_SCORE: 11
ADLS_ACUITY_SCORE: 12

## 2018-09-23 NOTE — PLAN OF CARE
Problem: Patient Care Overview  Goal: Plan of Care/Patient Progress Review  Outcome: No Change  VSS. Afebrile. LS diminished in LLL.  Adequate urine output via BR. CMS inact. Incision d/i with small amount of dried drainage. CT clamped overnight. No air leak or crepitus. Colostomy with stool output, pt managing independently. On-Q intact, sensors taped and unclamped. Pain managed with Tylenol and PO dilaudid.  Slept between cares.

## 2018-09-23 NOTE — PROGRESS NOTES
[facility]    Hospitalist Progress Note    Assessment & Plan   Santa Yousif is a 57 year old female with paranoid Schizophrenia with COPD, who was admitted on 9/21/2018 for elective surgery for left upper lobe nodule, and smokes    Impression:   Principal Problem:    Nodule of left lung -- S/P DIANA lobectomy 9/21/18  Active Problems:    Smoker    Paranoid schizophrenia (H)    Status post ileostomy (H)    Heart murmur -- normal Echo 9/14/18    COPD (chronic obstructive pulmonary disease) (H)      Plan:  Doing well, can use her own nicotine lozenge if wanted.     DVT Prophylaxis: Pneumatic Compression Devices  Code Status: Full Code    Disposition: Expected discharge in 2 days once chest tube out.    Estevan Mcginnis MD  Pager [phone]  Cell Phone [phone]  Text Page (7am to 6pm)    Interval History   Reports 2 out of 10 chest pain, has left chest tube.     Physical Exam   Temp: 98.6  F (37  C) Temp src: Oral BP: 125/80 Pulse: 73 Heart Rate: 71 Resp: 16 SpO2: 92 % O2 Device: None (Room air)    Vitals:    09/21/18 0626   Weight: 75.8 kg (167 lb)     Vital Signs with Ranges  Temp:  [98.2  F (36.8  C)-98.7  F (37.1  C)] 98.6  F (37  C)  Pulse:  [57-73] 73  Heart Rate:  [59-71] 71  Resp:  [16-20] 16  BP: (100-130)/(55-80) 125/80  SpO2:  [90 %-95 %] 92 %  I/O last 3 completed shifts:  In: 360 [P.O.:360]  Out: 3050 [Urine:2800; Chest Tube:250]    # Pain Assessment:  Current Pain Score 9/23/2018   Patient currently in pain? yes   Pain score (0-10) 1   Pain location -   Pain descriptors -   - Santa is experiencing pain due to chest tube and surgery. Pain management was discussed and the plan was created in a collaborative fashion.  Santa's response to the current recommendations: engaged  - see orders    Constitutional: Awake, alert, cooperative, no apparent distress  Respiratory: slight bilateral expiratory rhonchi  Cardiovascular: Regular rate and rhythm, normal S1 and S2, and no  murmur noted  GI: Normal bowel sounds, soft, non-distended, non-tender  Extrem: No calf tenderness, no ankle edema  Neuro: Ox3, no focal motor or sensory deficits    Medications     - MEDICATION INSTRUCTIONS -         acetaminophen  975 mg Oral Q8H     bacitracin   Topical TID     citalopram (celeXA) tablet 20 mg  20 mg Oral QPM     divalproex sodium delayed-release  500 mg Oral At Bedtime     enoxaparin  40 mg Subcutaneous Q24H     ranitidine  150 mg Oral Q12H    Or     famotidine  20 mg Intravenous Q12H     gabapentin (NEURONTIN) capsule 300 mg  300 mg Oral BID     lamoTRIgine (LaMICtal) tablet 100 mg  100 mg Oral Daily     levothyroxine (SYNTHROID/LEVOTHROID) tablet 88 mcg  88 mcg Oral Daily     lovastatin (MEVACOR) tablet 40 mg  40 mg Oral At Bedtime     oxybutynin (DITROPAN-XL) 24 hr tablet 15 mg  15 mg Oral Daily     QUEtiapine  50 mg Oral At Bedtime     senna-docusate  1 tablet Oral BID    Or     senna-docusate  2 tablet Oral BID     sodium chloride (PF)  3 mL Intracatheter Q8H     thiothixene (NAVANE) capsule 5 mg  5 mg Oral At Bedtime       Data     Recent Labs  Lab 09/21/18  0625   WBC 8.1   HGB 13.7   *      INR 0.95      POTASSIUM 4.2   CHLORIDE 107   CO2 28   BUN 6*   CR 0.71   ANIONGAP 6   YOUSIF 8.9   GLC 93       Imaging:   Recent Results (from the past 24 hour(s))   XR Chest Port 1 View    Narrative    XR CHEST PORT 1 VW  9/23/2018 6:25 AM     HISTORY:  s/p left thoracotomy, left upper lobectomy;     COMPARISON: Film performed on 9/22/2018.    FINDINGS:  Left chest tube is in place. No pneumothorax is seen. Small  amount of subcutaneous air seen along the left chest wall.      Impression    IMPRESSION: No pneumothorax is identified. Left chest tube.

## 2018-09-23 NOTE — PLAN OF CARE
Problem: Patient Care Overview  Goal: Plan of Care/Patient Progress Review  Outcome: Adequate for Discharge Date Met: 09/23/18  A&O. VSS. Pain managed with PO dilaudid. Discussed discharge instructions, On-Q management and removal, and medications with pt and pt's sister, verbalized understanding. Pt discharged to home with belongings and medications. Transportation provided by pt's sister

## 2018-09-23 NOTE — PROGRESS NOTES
THORACIC SURGERY POD # 2    Doing well  AVSS on RA  No air leak, no bleeding    CXR no ptx with CT clamped    CT out    discharge today    KIKE SHIPMAN MD [facility] ONCOLOGY THORACIC SURGERY  CELL:  [phone]  OFFICE: [phone]

## 2018-09-23 NOTE — DISCHARGE INSTRUCTIONS
[facility]  Discharge Orders & Follow-up Care  Video-Assisted Thoracoscopy or Thoracotomy    You already have your follow-up appointments scheduled for you:  Please go to [facility] for a chest x-ray at _____________________________. [facility] is located in the same building ([facility], [address] [place] [address]) as Dr. Jauregui' office. They are in [address].  Please then go for your post-operative follow-up appointment in Dr. Jauregui' office, on the second floor of the [facility], [address] at ____________. You will see either Romelia Isbell PA-C or Dr. Jauregui during your appointment.      Call Echo at Dr. Jauregui  office at [phone] to make a return appointment with a chest x-ray on October 1st.  Your appointment will be with either Romelia Isbell PA-C or Analisa Webb PA-C, or Dr. Jauregui.        A. Patient Care:  Call Dr Jauregui  office @ [phone] if you experience:  *Severe chills or a fever or 101 F or higher on two occasions  *Increased incisional pain that cannot be relieved with rest or pain medications  *Presence of unusual incisional or chest tube site drainage that is odorous, green or yellow in color, or if your incision is warm, red or swollen  *Coughing up bright red blood or greenish-yellow secretions  *Chest pain that gets worse with deep breathing or a significant increase in shortness of breath  *Inability to urinate or have a bowel movement  *New pain or swelling in your legs    In an emergency, call [phone] or have someone drive you to the nearest Emergency Department    Pain Relief:  You may have been given a prescription for narcotic pain medicine.  You may also take ibuprofen and acetaminophen either as a new prescription  or over the counter.  Recommended dosages are:  600 mg Ibuprofen every 6 hours as needed and 650 mg Acetaminophen every 4 hours as needed.  Many patients get good pain relief by  "\"staggering\" these medications.     Constipation:  Narcotic pain medication, general anesthesia, and time in the hospital with less activity than normal can all cause constipation. Please take a stool softener (what you have at home or one that was prescribed during hospital discharge, such as Senokot-S, docusate sodium, Miralax, Milk of Magnesia) while you are taking narcotics to prevent constipation. Stop taking the stool softener once you are done taking narcotics or if you begin having loose stools/diarrhea. Please call our clinic nurse, Tara, at [phone] if you are not having success (not having BMs) with your current stool softener.     No driving while on narcotics.     Activity:  ___ No activity restrictions for a scope procedure/thoracoscopy  XXX No heavy lifting greater than 8-10 pounds on the operative side for 4-6 weeks for a thoracotomy    Wound Care:  *You should look at your incision each day and keep it clean while it heals  *Do not apply any creams, salves such as Bacitracin, or ointments on the incision while it is healing  * Steri strips (thin white paper strips) will be present on the incision(s) and they will peel off as your incision heals-- otherwise, they will be removed at your post-op appointment.    *Remove the dressings covering your chest tube site 48 hours after your discharge from the hospital. You may then shower.  Wash the incision and chest tube site(s) daily with soap and water. No bathing or immersing incision underwater for approximately 2 weeks or until the chest tube sites are completely healed.     Place a dry gauze dressing (and tape) over the chest tube site because it is normal to have some drainage for a few days after the chest tube is removed.  Do not be alarmed if a large amount of fluid drains (should be pink or yellow) either spontaneously or with coughing or exertion. If this happens, just place a larger dry gauze dressing over the chest tube site-- it will " stop and scab over in about a week or so. Once the drainage stops, you can stop covering the chest tube site with a dressing. Call our office if the drainage is milky or green in color or foul-smelling.    B. Respiratory:  XXX Utilize Incentive spirometer and flutter valve/acapella (if you received one) 10 times in a row every few hours while awake for a few weeks after discharging home from the hospital    C. Activity:  It may take a few months to regain your normal energy level/stamina. It is important during your recovery to get regular physical activity:  *walk each day at a comfortable pace  *climb stairs as tolerated  *take some rest periods each day but try not to take too many long naps, as this can affect your sleep at night    D. Returning to Work:  Time away from work will depend on your situation. In general, you will need between 1-6 weeks to recover from surgery. Specific dates for returning to work can be discussed at your post-op appointments.       Directions for On-Q Pain Pump Removal:  1. Remove the dressing covering the catheter site.  2. Grasp the catheter close to the skin and gently pull on the catheter. It should be easy to remove and not painful. If it becomes hard to remove or stretches, then stop and call   office.  3. Do not cut or pull hard to remove the catheter.  4. After you remove the catheter, check the catheter tip for a black marking to ensure the entire catheter was removed. Call our office if you don't see the black marking.  5. Place a band-aid over the catheter site if needed.  6. Call our office is you have redness, warmth or excessive bleeding from the catheter site or if there is a large bruise or swollen area around the site.    Revised May 2018

## 2018-09-24 LAB — COPATH REPORT: NORMAL

## 2018-09-25 NOTE — DISCHARGE SUMMARY
THORACIC SURGERY HOSPITAL DISCHARGE SUMMARY  [facility] ONCOLOGY - THORACIC SURGERY  [address], [address]  [place] [address]  Phone [phone]  [email or web address]    9/25/2018     Claudia Cordon   [facility] [address]  / [place] [address]  Phone: [phone]   Fax: [phone]        Re: Santa Yousif             1960             [id number]              Dates of Hospitalization: 9/21/2018 - 9/23/2018   Date of Service (when I saw the patient): 9/23/2018    Dear Dr. Marion Pizano:     As you are aware, we had the pleasure of caring for your patient,  Santa Yousif here at [facility].  She is a 57-year-old woman with a history of smoking.  She was found to have an enlarging hypermetabolic nodule measuring approximately 1 cm in the left upper lobe lung.  There is no evidence of zaire or distant disease.  Pulmonary function tests are adequate.  This nodule is highly suspicious for malignancy.  Options of diagnostic procedure were reviewed, elected to proceed with surgical resection as this nodule was not amenable to CT-guided biopsy.     On 9/21/2018, Dr. Miguel Jauregui performed the following:    Procedure/Surgery Information   Procedure: 1.  Limited left thoracotomy.   2.  Left upper lobectomy with mediastinal lymph node dissection   Surgeon(s): Surgeon(s) and Role:     * Miguel Jauregui MD - Primary     * Romelia Isbell PA-C - Assisting   Specimens:   ID Type Source Tests Collected by Time Destination   A : posterier hilar lymph nodes Tissue Lung SURGICAL PATHOLOGY EXAM Miguel Jauregui MD 9/21/2018  8:02 AM    B : tracheo bronchial angle lymph node Tissue Lung SURGICAL PATHOLOGY EXAM Miguel Jauregui MD 9/21/2018  8:09 AM    C : anterior hilar lymph node Tissue Lung SURGICAL PATHOLOGY EXAM Miguel Jauregui MD 9/21/2018  8:14 AM    D : A-P window nodes Tissue Lung SURGICAL  PATHOLOGY EXAM Miguel Jauregui MD 9/21/2018  8:16 AM    E :  Tissue Lung, Left Upper Lobe SURGICAL PATHOLOGY EXAM Miguel Jauregui MD 9/21/2018  8:37 AM             Final Surgical Pathology Revealed:  Left upper lobe lung moderate to poorly differentiated adenocarcinoma, 1.4 cm, acinar predominant (70 acinar, 30% solid). Resection margins negative and all mediastinal lymph nodes negative for metastatic carcinoma. No visceral pleura invasion. Final pathologic stage [id number],    Final stage IA2.     Her post-operative course was unremarkable.      Consultations This Hospital Stay   HOSPITALIST IP CONSULT    Santa Yousif has otherwise recovered sufficiently to be discharged to home today, 9/23/2018, on post-operative day number two for further convalescence.  Her incisions are healing well with no signs or symptoms of infection.  Her bowels have moved sufficiently and she is tolerating diet and activity, ambulating and transferring independently.  She is currently afebrile with stable vital signs.     Below, you will find a full discharge medication list and instructions.  We have arranged for Santa Yousif to follow-up with us in our [facility] in 7-10 days with a Chest X-ray prior to that appointment.  We thank you for allowing us to participate in the care of Santa Yousif here at [facility].  Please feel free to contact our office at [phone] with any questions or concerns or if we can be of any further assistance in the care of this patient.    Sincerely,    Dr. Miguel Jauregui MD    D/C Summary Prepared by: Romelia Isbell PA-C    Discharge Medications:  Discharge Medication List as of 9/23/2018  1:45 PM      START taking these medications    Details   HYDROmorphone (DILAUDID) 2 MG tablet Take 1-2 tablets (2-4 mg) by mouth every 3 hours as needed, Disp-60 tablet, R-0, Local Print         CONTINUE these medications which have NOT CHANGED    Details   ASPIRIN PO Take  81 mg by mouth daily, Historical      Citalopram Hydrobromide (CELEXA PO) Take 20 mg by mouth every evening, Historical      divalproex sodium delayed-release (DEPAKOTE) 500 MG DR tablet Take 500 mg by mouth At Bedtime (Dose cut into thirds- otherwise tablet goes straight to her colostomy bag), Historical      GABAPENTIN PO Take 300 mg by mouth 2 times daily, Historical      LamoTRIgine (LAMICTAL PO) Take 100 mg by mouth daily (0.5 x 200 mg = 100 mg dose), Historical      Levothyroxine Sodium (SYNTHROID PO) Take 88 mcg by mouth daily, Historical      LOVASTATIN PO Take 40 mg by mouth At Bedtime, Historical      Oxybutynin Chloride (DITROPAN XL PO) Take 15 mg by mouth daily, Historical      QUEtiapine Fumarate (SEROQUEL PO) Take 50 mg by mouth At Bedtime (0.5 x 100 mg = 50 mg dose), Historical      THIOTHIXENE PO Take 5 mg by mouth At Bedtime, Historical      VITAMIN D, CHOLECALCIFEROL, PO Take 1,000 Units by mouth daily, Historical                 Discharge Instructions:  1) Remove chest tube dressing on 9/25/18 and then it is Ok to shower.  Please wash both incision and chest tube site daily with soap and water.  You may cover the chest tube site daily with a clean band-aid or dry gauze if it continues to drain.  2) Steri-strips can be removed in 1 week or they will fall off when they are ready.  3) Continue daily use of your Incentive Spirometer, set of 10x in a row, every 1-2 hours while you are awake during the day.  4) No lifting, pushing or pulling >8-10 lbs for 4 weeks from the day of your surgery.  No driving while on narcotic pain medications.    Follow-Up Care:  1) Follow up with Romelia Isbell PA-C/Dr. Jauregui at the [place] [facility] in [place] ([address] [place] [address], [address], [place] [address]).  Call Echo at [phone] to schedule the appointment.  2) Follow up with Primary Care Provider, Claudia Cordon within 1 month of discharge for routine post-surgical care, wound check and follow  up.  Please call [phone] to arrange this appointment.       CC  Patient Care Team:  Claudia Cordon MD as PCP - General (Family Practice)  Claudia Cordon MD (Family Practice)

## 2019-03-11 ENCOUNTER — TRANSFERRED RECORDS (OUTPATIENT)
Dept: HEALTH INFORMATION MANAGEMENT | Facility: CLINIC | Age: 59
End: 2019-03-11

## 2019-03-27 ENCOUNTER — OFFICE VISIT (OUTPATIENT)
Dept: SURGERY | Facility: CLINIC | Age: 59
End: 2019-03-27
Payer: MEDICARE

## 2019-03-27 VITALS
RESPIRATION RATE: 16 BRPM | HEIGHT: 66 IN | WEIGHT: 167 LBS | BODY MASS INDEX: 26.84 KG/M2 | SYSTOLIC BLOOD PRESSURE: 100 MMHG | DIASTOLIC BLOOD PRESSURE: 60 MMHG | HEART RATE: 70 BPM

## 2019-03-27 DIAGNOSIS — Z90.49 STATUS POST COLON RESECTION: ICD-10-CM

## 2019-03-27 DIAGNOSIS — Z93.9 HISTORY OF CREATION OF OSTOMY (H): Primary | ICD-10-CM

## 2019-03-27 PROCEDURE — 99203 OFFICE O/P NEW LOW 30 MIN: CPT | Performed by: SURGERY

## 2019-03-27 ASSESSMENT — MIFFLIN-ST. JEOR: SCORE: 1344.26

## 2019-03-28 NOTE — PROGRESS NOTES
"[facility]  Surgery Consultation    PCP:  Claudia Cordon [phone]  Consultation requested by: Miguel Jauregui MD    HPI: This patient is a 58-year-old female referred by her thoracic surgeon to assess the possibility of proceeding with ileostomy takedown.  She has a relatively complicated past surgical history.  This began many years ago whereby she underwent urgent surgery for diverticulitis which involved the creation of a temporary colostomy.  She ultimately had that colostomy taken down.  Following that procedure she developed a incisional hernia and ultimately underwent a mesh incisional hernia repair I believe in 2009.  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  She was taken to surgery at that time and underwent removal of the mesh with the creation of an end ileostomy.  There was a second procedure during that hospitalization for some form of intra-abdominal leak.  She ultimately did heal from this and has been living with her ileostomy ever since.  She is here to discuss the possibility of proceeding with ileostomy takedown.    PMH:   has no past medical history on file.  COPD, tobacco abuse, lung cancer, chronic pain, hypothyroidism, psychiatric illness  PSH:    has a past surgical history that includes Thoracotomy (Left, 9/21/2018) and Lobectomy lung (Left, 9/21/2018).  Social History:   reports that she has been smoking cigarettes.  She has been smoking about 0.50 packs per day. she has never used smokeless tobacco. She reports that she does not drink alcohol or use drugs.  Family History:   family history is not on file.  Medications/Allergies: Home medications and allergies reviewed.    ROS:  The 10 point Review of Systems is negative other than noted in the HPI.    Physical Exam:  /60 (BP Location: Left arm, Cuff Size: Adult Regular)   Pulse 70   Resp 16   Ht 1.676 m (5' 6\")   Wt 75.8 kg (167 lb)  "  BMI 26.95 kg/m    GENERAL: Generally appears well.  Psych: Alert and Oriented.  Normal affect  Eyes: Sclera clear  Respiratory:  Lungs clear to ausculation bilaterally with good air excursion  Cardiovascular:  Regular Rate and Rhythm with no murmurs gallops or rubs, normal peripheral pulses  GI: Abdomen Non Distended Non-Tender  No hernias palpated.. Healthy and patent ileostomy in the right abdomen  Lymphatic/Hematologic/Immune:  No femoral or cervical lymphadenopathy.  Integumentary:  No rashes  Neurological: grossly intact      All new lab and imaging data was reviewed.  Also extensive review of old records and operative notes    Impression and Plan:  Patient is a 58 year old female with ileostomy since 2012 considering reversal    PLAN: I discussed at great length with her her options.  I think she would be a candidate for ileostomy takedown as she does not appear to have any significant comorbid medical conditions that would prevent this.  She has stopped smoking, is not morbidly obese does not take chronic steroids.  All that said I still get the sense that she is a somewhat elevated risk candidate for surgery and this was discussed with her.  I am curious about her anatomy given that 1 of her operative notes had indicated the creation of an ileostomy and a colostomy but she clearly only has one stoma.  I have suggested that it may be helpful to send her for a CT scan of the abdomen and pelvis with both oral and rectal contrast.  She is amenable to this and we will proceed with the study.    Thank you very much for this consult.    Rudy Lee M.D.  [facility]  [phone]    Please route or send letter to:  Primary Care Provider (PCP) and Referring Provider

## 2019-04-03 ENCOUNTER — HOSPITAL ENCOUNTER (OUTPATIENT)
Dept: CT IMAGING | Facility: CLINIC | Age: 59
Discharge: HOME | End: 2019-04-03
Attending: SURGERY | Admitting: SURGERY
Payer: MEDICARE

## 2019-04-03 DIAGNOSIS — Z93.9 HISTORY OF CREATION OF OSTOMY (H): ICD-10-CM

## 2019-04-03 DIAGNOSIS — Z90.49 STATUS POST COLON RESECTION: ICD-10-CM

## 2019-04-03 PROCEDURE — 25000128 H RX IP 250 OP 636: Performed by: SURGERY

## 2019-04-03 PROCEDURE — 74177 CT ABD & PELVIS W/CONTRAST: CPT

## 2019-04-03 PROCEDURE — 25000125 ZZHC RX 250: Performed by: SURGERY

## 2019-04-03 RX ORDER — IOPAMIDOL 755 MG/ML
82 INJECTION, SOLUTION INTRAVASCULAR ONCE
Status: COMPLETED | OUTPATIENT
Start: 2019-04-03 | End: 2019-04-03

## 2019-04-03 RX ADMIN — SODIUM CHLORIDE 63 ML: 9 INJECTION, SOLUTION INTRAVENOUS at 11:44

## 2019-04-03 RX ADMIN — IOPAMIDOL 82 ML: 755 INJECTION, SOLUTION INTRAVENOUS at 11:44

## 2019-04-22 ENCOUNTER — TELEPHONE (OUTPATIENT)
Dept: SURGERY | Facility: CLINIC | Age: 59
End: 2019-04-22

## 2019-04-22 NOTE — TELEPHONE ENCOUNTER
Name of caller: Cherie li    Reason for Call:  Results of CT    Surgeon:  Dr. Lee     Recent Surgery:  No    If yes, when & what type:  CT on 04-03-19      Best phone number to reach pt at is [phone]  Ok to leave a message with medical info? Yes.    Pharmacy preferred (if calling for a refill): na

## 2019-04-25 ENCOUNTER — TELEPHONE (OUTPATIENT)
Dept: SURGERY | Facility: CLINIC | Age: 59
End: 2019-04-25

## 2019-04-25 DIAGNOSIS — Z93.9 HISTORY OF CREATION OF OSTOMY (H): Primary | ICD-10-CM

## 2019-04-25 DIAGNOSIS — Z90.49 STATUS POST COLON RESECTION: ICD-10-CM

## 2019-04-25 NOTE — TELEPHONE ENCOUNTER
"Patient instructed that Dr. Lee is requiring further imaging to make an informed decision regarding her ostomy.    Patient is scheduled for Barium Contrast Enema at [facility] on 4/26/19 at 11am.   * Check-in at 10:45am at the 1st Floor \"Welcome Desk\"   * Nothing to eat after 3am   * Nothing to drink after 9am    Please contact Linda at [facility] with any questions or concerns.     "

## 2019-04-25 NOTE — TELEPHONE ENCOUNTER
Received pt call, pt requests RN to speak to sister Raegan about her care.      Spoke to sister Raegan at pt request.   Asks if pt CT results are back, if pt can have the surgery to take down her ileostomy bag.      Consulted Dr. Lee about this. [facility] CT did not include rectal contrast as he had wanted.  [facility] pt now needs to have a barium contrast enema.    Referred to Linda, surgery scheduler to call pt back to schedule this.    Holley Fisher, RN on 4/25/2019 at 11:25 AM

## 2019-04-26 ENCOUNTER — HOSPITAL ENCOUNTER (OUTPATIENT)
Dept: GENERAL RADIOLOGY | Facility: CLINIC | Age: 59
Discharge: HOME | End: 2019-04-26
Attending: SURGERY | Admitting: SURGERY
Payer: MEDICARE

## 2019-04-26 DIAGNOSIS — Z93.9 HISTORY OF CREATION OF OSTOMY (H): ICD-10-CM

## 2019-04-26 DIAGNOSIS — Z90.49 STATUS POST COLON RESECTION: ICD-10-CM

## 2019-04-26 PROCEDURE — 74283 THER NMA RDCTJ INTUS/OBSTRCJ: CPT

## 2019-04-26 RX ADMIN — DIATRIZOATE MEGLUMINE AND DIATRIZOATE SODIUM 480 ML: 660; 100 SOLUTION ORAL; RECTAL at 11:23

## 2019-05-02 ENCOUNTER — TELEPHONE (OUTPATIENT)
Dept: SURGERY | Facility: CLINIC | Age: 59
End: 2019-05-02

## 2019-05-02 NOTE — TELEPHONE ENCOUNTER
Name of caller: Jai li    Reason for Call:  Results of test    Surgeon:  Dr. Lee     Recent Surgery:  No    If yes, when & what type:  X-ray 04/26/19      Best phone number to reach pt at is: [phone] or sister jai [phone]  Ok to leave a message with medical info? Yes.    Pharmacy preferred (if calling for a refill): na

## 2019-05-03 NOTE — TELEPHONE ENCOUNTER
Called Raegan and informed her that Dr. Lee reviewed the images and reports everything looks good and ostomy takedown can be scheduled at patient's convenience.    Transferred to surgery scheduler.    Shelly Agrawal, RN-BSN

## 2019-05-07 NOTE — TELEPHONE ENCOUNTER
Type of surgery: Ileostomy takedown  Location of surgery: [facility]  Date and time of surgery: 5/15/19 at 9:45am  Surgeon: Dr. Rudy Lee  Pre-Op Appt Date: Patient to schedule  Post-Op Appt Date: Patient to schedule   Packet sent out: Yes  Pre-cert/Authorization completed:  Not Applicable  Date: 5/7/19

## 2019-05-15 ENCOUNTER — HOSPITAL ENCOUNTER (INPATIENT)
Facility: CLINIC | Age: 59
LOS: 13 days | Discharge: NURSING, REHAB OR LONG-TERM CARE | DRG: 330 | End: 2019-05-28
Attending: SURGERY | Admitting: SURGERY
Payer: MEDICARE

## 2019-05-15 ENCOUNTER — APPOINTMENT (OUTPATIENT)
Dept: SURGERY | Facility: PHYSICIAN GROUP | Age: 59
End: 2019-05-15
Payer: MEDICARE

## 2019-05-15 ENCOUNTER — ANESTHESIA EVENT (OUTPATIENT)
Dept: SURGERY | Facility: CLINIC | Age: 59
DRG: 330 | End: 2019-05-15
Payer: MEDICARE

## 2019-05-15 ENCOUNTER — ANESTHESIA (OUTPATIENT)
Dept: SURGERY | Facility: CLINIC | Age: 59
DRG: 330 | End: 2019-05-15
Payer: MEDICARE

## 2019-05-15 DIAGNOSIS — E87.70 HYPERVOLEMIA, UNSPECIFIED HYPERVOLEMIA TYPE: ICD-10-CM

## 2019-05-15 DIAGNOSIS — Z98.890 S/P CLOSURE OF ILEOSTOMY: Primary | ICD-10-CM

## 2019-05-15 LAB
CREAT SERPL-MCNC: 0.73 MG/DL (ref 0.52–1.04)
GFR SERPL CREATININE-BSD FRML MDRD: 89 ML/MIN/{1.73_M2}
PLATELET # BLD AUTO: 351 10E9/L (ref 150–450)

## 2019-05-15 PROCEDURE — 25000132 ZZH RX MED GY IP 250 OP 250 PS 637: Performed by: SURGERY

## 2019-05-15 PROCEDURE — 85049 AUTOMATED PLATELET COUNT: CPT | Performed by: PHYSICIAN ASSISTANT

## 2019-05-15 PROCEDURE — 25000128 H RX IP 250 OP 636: Performed by: PHYSICIAN ASSISTANT

## 2019-05-15 PROCEDURE — 0WQF0ZZ REPAIR ABDOMINAL WALL, OPEN APPROACH: ICD-10-PCS | Performed by: SURGERY

## 2019-05-15 PROCEDURE — 71000012 ZZH RECOVERY PHASE 1 LEVEL 1 FIRST HR: Performed by: SURGERY

## 2019-05-15 PROCEDURE — 12000000 ZZH R&B MED SURG/OB

## 2019-05-15 PROCEDURE — 25000128 H RX IP 250 OP 636: Performed by: SURGERY

## 2019-05-15 PROCEDURE — 44120 REMOVAL OF SMALL INTESTINE: CPT | Mod: AS | Performed by: PHYSICIAN ASSISTANT

## 2019-05-15 PROCEDURE — 44120 REMOVAL OF SMALL INTESTINE: CPT | Performed by: SURGERY

## 2019-05-15 PROCEDURE — 25800030 ZZH RX IP 258 OP 636: Performed by: PHYSICIAN ASSISTANT

## 2019-05-15 PROCEDURE — 25000125 ZZHC RX 250: Performed by: ANESTHESIOLOGY

## 2019-05-15 PROCEDURE — 25000125 ZZHC RX 250: Performed by: NURSE ANESTHETIST, CERTIFIED REGISTERED

## 2019-05-15 PROCEDURE — 37000009 ZZH ANESTHESIA TECHNICAL FEE, EACH ADDTL 15 MIN: Performed by: SURGERY

## 2019-05-15 PROCEDURE — 25800030 ZZH RX IP 258 OP 636: Performed by: NURSE ANESTHETIST, CERTIFIED REGISTERED

## 2019-05-15 PROCEDURE — 82565 ASSAY OF CREATININE: CPT | Performed by: PHYSICIAN ASSISTANT

## 2019-05-15 PROCEDURE — 25000125 ZZHC RX 250: Performed by: SURGERY

## 2019-05-15 PROCEDURE — 36000093 ZZH SURGERY LEVEL 4 1ST 30 MIN: Performed by: SURGERY

## 2019-05-15 PROCEDURE — 25800030 ZZH RX IP 258 OP 636: Performed by: ANESTHESIOLOGY

## 2019-05-15 PROCEDURE — 25000132 ZZH RX MED GY IP 250 OP 250 PS 637: Performed by: NURSE ANESTHETIST, CERTIFIED REGISTERED

## 2019-05-15 PROCEDURE — A9270 NON-COVERED ITEM OR SERVICE: HCPCS | Performed by: PHYSICIAN ASSISTANT

## 2019-05-15 PROCEDURE — 36000063 ZZH SURGERY LEVEL 4 EA 15 ADDTL MIN: Performed by: SURGERY

## 2019-05-15 PROCEDURE — A9270 NON-COVERED ITEM OR SERVICE: HCPCS | Performed by: SURGERY

## 2019-05-15 PROCEDURE — 49565 ZZHC REPAIR RECURR INCIS HERNIA,REDUC: CPT | Mod: 59 | Performed by: SURGERY

## 2019-05-15 PROCEDURE — 0DBB0ZZ EXCISION OF ILEUM, OPEN APPROACH: ICD-10-PCS | Performed by: SURGERY

## 2019-05-15 PROCEDURE — 25000128 H RX IP 250 OP 636: Performed by: ANESTHESIOLOGY

## 2019-05-15 PROCEDURE — 25000128 H RX IP 250 OP 636: Performed by: NURSE ANESTHETIST, CERTIFIED REGISTERED

## 2019-05-15 PROCEDURE — 40000170 ZZH STATISTIC PRE-PROCEDURE ASSESSMENT II: Performed by: SURGERY

## 2019-05-15 PROCEDURE — 36415 COLL VENOUS BLD VENIPUNCTURE: CPT | Performed by: PHYSICIAN ASSISTANT

## 2019-05-15 PROCEDURE — 27210794 ZZH OR GENERAL SUPPLY STERILE: Performed by: SURGERY

## 2019-05-15 PROCEDURE — A9270 NON-COVERED ITEM OR SERVICE: HCPCS | Performed by: NURSE ANESTHETIST, CERTIFIED REGISTERED

## 2019-05-15 PROCEDURE — 37000008 ZZH ANESTHESIA TECHNICAL FEE, 1ST 30 MIN: Performed by: SURGERY

## 2019-05-15 PROCEDURE — 0DNW0ZZ RELEASE PERITONEUM, OPEN APPROACH: ICD-10-PCS | Performed by: SURGERY

## 2019-05-15 PROCEDURE — 25000125 ZZHC RX 250: Performed by: PHYSICIAN ASSISTANT

## 2019-05-15 PROCEDURE — 25000566 ZZH SEVOFLURANE, EA 15 MIN: Performed by: SURGERY

## 2019-05-15 PROCEDURE — 49565 ZZHC REPAIR RECURR INCIS HERNIA,REDUC: CPT | Mod: AS | Performed by: PHYSICIAN ASSISTANT

## 2019-05-15 PROCEDURE — C1765 ADHESION BARRIER: HCPCS | Performed by: SURGERY

## 2019-05-15 PROCEDURE — 25000132 ZZH RX MED GY IP 250 OP 250 PS 637: Performed by: PHYSICIAN ASSISTANT

## 2019-05-15 RX ORDER — OXYBUTYNIN CHLORIDE 5 MG/1
15 TABLET, EXTENDED RELEASE ORAL EVERY MORNING
Status: DISCONTINUED | OUTPATIENT
Start: 2019-05-15 | End: 2019-05-28 | Stop reason: HOSPADM

## 2019-05-15 RX ORDER — DIPHENHYDRAMINE HYDROCHLORIDE 50 MG/ML
25 INJECTION INTRAMUSCULAR; INTRAVENOUS EVERY 6 HOURS PRN
Status: DISCONTINUED | OUTPATIENT
Start: 2019-05-15 | End: 2019-05-28 | Stop reason: HOSPADM

## 2019-05-15 RX ORDER — PIPERACILLIN SODIUM, TAZOBACTAM SODIUM 3; .375 G/15ML; G/15ML
3.38 INJECTION, POWDER, LYOPHILIZED, FOR SOLUTION INTRAVENOUS EVERY 6 HOURS
Status: DISCONTINUED | OUTPATIENT
Start: 2019-05-15 | End: 2019-05-22

## 2019-05-15 RX ORDER — CYCLOBENZAPRINE HCL 10 MG
10 TABLET ORAL 3 TIMES DAILY PRN
Status: DISCONTINUED | OUTPATIENT
Start: 2019-05-15 | End: 2019-05-28 | Stop reason: HOSPADM

## 2019-05-15 RX ORDER — QUETIAPINE FUMARATE 25 MG/1
50 TABLET, FILM COATED ORAL AT BEDTIME
Status: DISCONTINUED | OUTPATIENT
Start: 2019-05-15 | End: 2019-05-28 | Stop reason: HOSPADM

## 2019-05-15 RX ORDER — PROCHLORPERAZINE MALEATE 10 MG
10 TABLET ORAL EVERY 6 HOURS PRN
Status: DISCONTINUED | OUTPATIENT
Start: 2019-05-15 | End: 2019-05-28 | Stop reason: HOSPADM

## 2019-05-15 RX ORDER — ONDANSETRON 2 MG/ML
4 INJECTION INTRAMUSCULAR; INTRAVENOUS EVERY 6 HOURS PRN
Status: DISCONTINUED | OUTPATIENT
Start: 2019-05-15 | End: 2019-05-28 | Stop reason: HOSPADM

## 2019-05-15 RX ORDER — PROPOFOL 10 MG/ML
INJECTION, EMULSION INTRAVENOUS CONTINUOUS PRN
Status: DISCONTINUED | OUTPATIENT
Start: 2019-05-15 | End: 2019-05-15

## 2019-05-15 RX ORDER — ASPIRIN 81 MG/1
81 TABLET ORAL EVERY MORNING
COMMUNITY

## 2019-05-15 RX ORDER — LAMOTRIGINE 100 MG/1
100 TABLET ORAL DAILY
Status: DISCONTINUED | OUTPATIENT
Start: 2019-05-15 | End: 2019-05-28 | Stop reason: HOSPADM

## 2019-05-15 RX ORDER — HYDROMORPHONE HYDROCHLORIDE 1 MG/ML
.3-.5 INJECTION, SOLUTION INTRAMUSCULAR; INTRAVENOUS; SUBCUTANEOUS EVERY 5 MIN PRN
Status: DISCONTINUED | OUTPATIENT
Start: 2019-05-15 | End: 2019-05-15 | Stop reason: HOSPADM

## 2019-05-15 RX ORDER — POLYETHYLENE GLYCOL 3350 17 G
2 POWDER IN PACKET (EA) ORAL
Status: DISCONTINUED | OUTPATIENT
Start: 2019-05-15 | End: 2019-05-28 | Stop reason: HOSPADM

## 2019-05-15 RX ORDER — ACETAMINOPHEN 325 MG/1
975 TABLET ORAL ONCE
Status: COMPLETED | OUTPATIENT
Start: 2019-05-15 | End: 2019-05-15

## 2019-05-15 RX ORDER — CITALOPRAM HYDROBROMIDE 20 MG/1
20 TABLET ORAL EVERY EVENING
Status: DISCONTINUED | OUTPATIENT
Start: 2019-05-15 | End: 2019-05-28 | Stop reason: HOSPADM

## 2019-05-15 RX ORDER — POLYETHYLENE GLYCOL 3350 17 G
2 POWDER IN PACKET (EA) ORAL
COMMUNITY

## 2019-05-15 RX ORDER — SODIUM CHLORIDE, SODIUM LACTATE, POTASSIUM CHLORIDE, CALCIUM CHLORIDE 600; 310; 30; 20 MG/100ML; MG/100ML; MG/100ML; MG/100ML
INJECTION, SOLUTION INTRAVENOUS CONTINUOUS
Status: DISCONTINUED | OUTPATIENT
Start: 2019-05-15 | End: 2019-05-24

## 2019-05-15 RX ORDER — NALOXONE HYDROCHLORIDE 0.4 MG/ML
.1-.4 INJECTION, SOLUTION INTRAMUSCULAR; INTRAVENOUS; SUBCUTANEOUS
Status: DISCONTINUED | OUTPATIENT
Start: 2019-05-15 | End: 2019-05-28 | Stop reason: HOSPADM

## 2019-05-15 RX ORDER — BUPIVACAINE HYDROCHLORIDE AND EPINEPHRINE 2.5; 5 MG/ML; UG/ML
INJECTION, SOLUTION INFILTRATION; PERINEURAL PRN
Status: DISCONTINUED | OUTPATIENT
Start: 2019-05-15 | End: 2019-05-15 | Stop reason: HOSPADM

## 2019-05-15 RX ORDER — NEOSTIGMINE METHYLSULFATE 1 MG/ML
VIAL (ML) INJECTION PRN
Status: DISCONTINUED | OUTPATIENT
Start: 2019-05-15 | End: 2019-05-15

## 2019-05-15 RX ORDER — SODIUM CHLORIDE, SODIUM LACTATE, POTASSIUM CHLORIDE, CALCIUM CHLORIDE 600; 310; 30; 20 MG/100ML; MG/100ML; MG/100ML; MG/100ML
INJECTION, SOLUTION INTRAVENOUS CONTINUOUS
Status: DISCONTINUED | OUTPATIENT
Start: 2019-05-15 | End: 2019-05-15 | Stop reason: HOSPADM

## 2019-05-15 RX ORDER — LIDOCAINE 40 MG/G
CREAM TOPICAL
Status: DISCONTINUED | OUTPATIENT
Start: 2019-05-15 | End: 2019-05-28 | Stop reason: HOSPADM

## 2019-05-15 RX ORDER — MAGNESIUM HYDROXIDE 1200 MG/15ML
LIQUID ORAL PRN
Status: DISCONTINUED | OUTPATIENT
Start: 2019-05-15 | End: 2019-05-15 | Stop reason: HOSPADM

## 2019-05-15 RX ORDER — DIVALPROEX SODIUM 500 MG/1
500 TABLET, DELAYED RELEASE ORAL AT BEDTIME
Status: DISCONTINUED | OUTPATIENT
Start: 2019-05-15 | End: 2019-05-28 | Stop reason: HOSPADM

## 2019-05-15 RX ORDER — NALOXONE HYDROCHLORIDE 0.4 MG/ML
.1-.4 INJECTION, SOLUTION INTRAMUSCULAR; INTRAVENOUS; SUBCUTANEOUS
Status: DISCONTINUED | OUTPATIENT
Start: 2019-05-15 | End: 2019-05-15

## 2019-05-15 RX ORDER — LIDOCAINE HYDROCHLORIDE 20 MG/ML
INJECTION, SOLUTION INFILTRATION; PERINEURAL PRN
Status: DISCONTINUED | OUTPATIENT
Start: 2019-05-15 | End: 2019-05-15

## 2019-05-15 RX ORDER — ALBUTEROL SULFATE 90 UG/1
AEROSOL, METERED RESPIRATORY (INHALATION) PRN
Status: DISCONTINUED | OUTPATIENT
Start: 2019-05-15 | End: 2019-05-15

## 2019-05-15 RX ORDER — ACETAMINOPHEN 325 MG/1
650 TABLET ORAL EVERY 4 HOURS PRN
Status: DISCONTINUED | OUTPATIENT
Start: 2019-05-18 | End: 2019-05-28 | Stop reason: HOSPADM

## 2019-05-15 RX ORDER — ACETAMINOPHEN 325 MG/1
975 TABLET ORAL EVERY 8 HOURS
Status: DISPENSED | OUTPATIENT
Start: 2019-05-15 | End: 2019-05-18

## 2019-05-15 RX ORDER — EPHEDRINE SULFATE 50 MG/ML
INJECTION, SOLUTION INTRAMUSCULAR; INTRAVENOUS; SUBCUTANEOUS PRN
Status: DISCONTINUED | OUTPATIENT
Start: 2019-05-15 | End: 2019-05-15

## 2019-05-15 RX ORDER — GLYCOPYRROLATE 0.2 MG/ML
INJECTION, SOLUTION INTRAMUSCULAR; INTRAVENOUS PRN
Status: DISCONTINUED | OUTPATIENT
Start: 2019-05-15 | End: 2019-05-15

## 2019-05-15 RX ORDER — THIOTHIXENE 5 MG/1
5 CAPSULE ORAL AT BEDTIME
Status: DISCONTINUED | OUTPATIENT
Start: 2019-05-15 | End: 2019-05-20

## 2019-05-15 RX ORDER — SIMVASTATIN 5 MG
20 TABLET ORAL AT BEDTIME
Status: DISCONTINUED | OUTPATIENT
Start: 2019-05-15 | End: 2019-05-28 | Stop reason: HOSPADM

## 2019-05-15 RX ORDER — ONDANSETRON 4 MG/1
4 TABLET, ORALLY DISINTEGRATING ORAL EVERY 6 HOURS PRN
Status: DISCONTINUED | OUTPATIENT
Start: 2019-05-15 | End: 2019-05-28 | Stop reason: HOSPADM

## 2019-05-15 RX ORDER — DIPHENHYDRAMINE HCL 25 MG
25 CAPSULE ORAL EVERY 6 HOURS PRN
Status: DISCONTINUED | OUTPATIENT
Start: 2019-05-15 | End: 2019-05-28 | Stop reason: HOSPADM

## 2019-05-15 RX ORDER — GABAPENTIN 300 MG/1
300 CAPSULE ORAL 2 TIMES DAILY
Status: DISCONTINUED | OUTPATIENT
Start: 2019-05-15 | End: 2019-05-28 | Stop reason: HOSPADM

## 2019-05-15 RX ORDER — AMOXICILLIN 250 MG
2 CAPSULE ORAL 2 TIMES DAILY
Status: DISCONTINUED | OUTPATIENT
Start: 2019-05-15 | End: 2019-05-28 | Stop reason: HOSPADM

## 2019-05-15 RX ORDER — OXYCODONE HYDROCHLORIDE 5 MG/1
5-10 TABLET ORAL
Status: DISCONTINUED | OUTPATIENT
Start: 2019-05-15 | End: 2019-05-28 | Stop reason: HOSPADM

## 2019-05-15 RX ORDER — ERTAPENEM 1 G/1
1 INJECTION, POWDER, LYOPHILIZED, FOR SOLUTION INTRAMUSCULAR; INTRAVENOUS
Status: DISCONTINUED | OUTPATIENT
Start: 2019-05-15 | End: 2019-05-15 | Stop reason: HOSPADM

## 2019-05-15 RX ORDER — ONDANSETRON 4 MG/1
4 TABLET, ORALLY DISINTEGRATING ORAL EVERY 30 MIN PRN
Status: DISCONTINUED | OUTPATIENT
Start: 2019-05-15 | End: 2019-05-15 | Stop reason: HOSPADM

## 2019-05-15 RX ORDER — FENTANYL CITRATE 50 UG/ML
25-50 INJECTION, SOLUTION INTRAMUSCULAR; INTRAVENOUS
Status: DISCONTINUED | OUTPATIENT
Start: 2019-05-15 | End: 2019-05-15 | Stop reason: HOSPADM

## 2019-05-15 RX ORDER — PROPOFOL 10 MG/ML
INJECTION, EMULSION INTRAVENOUS PRN
Status: DISCONTINUED | OUTPATIENT
Start: 2019-05-15 | End: 2019-05-15

## 2019-05-15 RX ORDER — AMOXICILLIN 250 MG
1 CAPSULE ORAL 2 TIMES DAILY
Status: DISCONTINUED | OUTPATIENT
Start: 2019-05-15 | End: 2019-05-28 | Stop reason: HOSPADM

## 2019-05-15 RX ORDER — ONDANSETRON 2 MG/ML
4 INJECTION INTRAMUSCULAR; INTRAVENOUS EVERY 30 MIN PRN
Status: DISCONTINUED | OUTPATIENT
Start: 2019-05-15 | End: 2019-05-15 | Stop reason: HOSPADM

## 2019-05-15 RX ORDER — LEVOTHYROXINE SODIUM 88 UG/1
88 TABLET ORAL
Status: DISCONTINUED | OUTPATIENT
Start: 2019-05-15 | End: 2019-05-28 | Stop reason: HOSPADM

## 2019-05-15 RX ORDER — ERTAPENEM 1 G/1
1 INJECTION, POWDER, LYOPHILIZED, FOR SOLUTION INTRAMUSCULAR; INTRAVENOUS SEE ADMIN INSTRUCTIONS
Status: DISCONTINUED | OUTPATIENT
Start: 2019-05-15 | End: 2019-05-15 | Stop reason: HOSPADM

## 2019-05-15 RX ORDER — FENTANYL CITRATE 50 UG/ML
INJECTION, SOLUTION INTRAMUSCULAR; INTRAVENOUS PRN
Status: DISCONTINUED | OUTPATIENT
Start: 2019-05-15 | End: 2019-05-15

## 2019-05-15 RX ORDER — VECURONIUM BROMIDE 1 MG/ML
INJECTION, POWDER, LYOPHILIZED, FOR SOLUTION INTRAVENOUS PRN
Status: DISCONTINUED | OUTPATIENT
Start: 2019-05-15 | End: 2019-05-15

## 2019-05-15 RX ORDER — DEXAMETHASONE SODIUM PHOSPHATE 4 MG/ML
INJECTION, SOLUTION INTRA-ARTICULAR; INTRALESIONAL; INTRAMUSCULAR; INTRAVENOUS; SOFT TISSUE PRN
Status: DISCONTINUED | OUTPATIENT
Start: 2019-05-15 | End: 2019-05-15

## 2019-05-15 RX ORDER — ONDANSETRON 2 MG/ML
INJECTION INTRAMUSCULAR; INTRAVENOUS PRN
Status: DISCONTINUED | OUTPATIENT
Start: 2019-05-15 | End: 2019-05-15

## 2019-05-15 RX ORDER — HYDROMORPHONE HYDROCHLORIDE 1 MG/ML
.3-.5 INJECTION, SOLUTION INTRAMUSCULAR; INTRAVENOUS; SUBCUTANEOUS
Status: DISCONTINUED | OUTPATIENT
Start: 2019-05-15 | End: 2019-05-26

## 2019-05-15 RX ADMIN — PHENYLEPHRINE HYDROCHLORIDE 50 MCG: 10 INJECTION, SOLUTION INTRAMUSCULAR; INTRAVENOUS; SUBCUTANEOUS at 10:24

## 2019-05-15 RX ADMIN — PHENYLEPHRINE HYDROCHLORIDE 50 MCG: 10 INJECTION, SOLUTION INTRAMUSCULAR; INTRAVENOUS; SUBCUTANEOUS at 11:00

## 2019-05-15 RX ADMIN — HYDROMORPHONE HYDROCHLORIDE 0.5 MG: 1 INJECTION, SOLUTION INTRAMUSCULAR; INTRAVENOUS; SUBCUTANEOUS at 11:17

## 2019-05-15 RX ADMIN — HYDROMORPHONE HYDROCHLORIDE 0.5 MG: 1 INJECTION, SOLUTION INTRAMUSCULAR; INTRAVENOUS; SUBCUTANEOUS at 12:33

## 2019-05-15 RX ADMIN — PROPOFOL 100 MG: 10 INJECTION, EMULSION INTRAVENOUS at 10:11

## 2019-05-15 RX ADMIN — MIDAZOLAM 0.5 MG: 1 INJECTION INTRAMUSCULAR; INTRAVENOUS at 10:04

## 2019-05-15 RX ADMIN — SODIUM CHLORIDE, POTASSIUM CHLORIDE, SODIUM LACTATE AND CALCIUM CHLORIDE: 600; 310; 30; 20 INJECTION, SOLUTION INTRAVENOUS at 11:00

## 2019-05-15 RX ADMIN — ALBUTEROL SULFATE 2 PUFF: 90 AEROSOL, METERED RESPIRATORY (INHALATION) at 12:32

## 2019-05-15 RX ADMIN — Medication 5 MG: at 10:24

## 2019-05-15 RX ADMIN — NEOSTIGMINE METHYLSULFATE 4 MG: 1 INJECTION, SOLUTION INTRAVENOUS at 12:46

## 2019-05-15 RX ADMIN — LIDOCAINE HYDROCHLORIDE 100 MG: 20 INJECTION, SOLUTION INFILTRATION; PERINEURAL at 10:11

## 2019-05-15 RX ADMIN — ACETAMINOPHEN 975 MG: 325 TABLET, FILM COATED ORAL at 08:58

## 2019-05-15 RX ADMIN — SODIUM CHLORIDE, POTASSIUM CHLORIDE, SODIUM LACTATE AND CALCIUM CHLORIDE: 600; 310; 30; 20 INJECTION, SOLUTION INTRAVENOUS at 22:36

## 2019-05-15 RX ADMIN — THIOTHIXENE 5 MG: 5 CAPSULE ORAL at 22:26

## 2019-05-15 RX ADMIN — Medication 0.3 MG: at 13:55

## 2019-05-15 RX ADMIN — ROCURONIUM BROMIDE 10 MG: 10 INJECTION INTRAVENOUS at 11:06

## 2019-05-15 RX ADMIN — LAMOTRIGINE 100 MG: 100 TABLET ORAL at 18:19

## 2019-05-15 RX ADMIN — DIVALPROEX SODIUM 500 MG: 500 TABLET, DELAYED RELEASE ORAL at 22:23

## 2019-05-15 RX ADMIN — VECURONIUM BROMIDE 1 MG: 1 INJECTION, POWDER, LYOPHILIZED, FOR SOLUTION INTRAVENOUS at 11:45

## 2019-05-15 RX ADMIN — LIDOCAINE HYDROCHLORIDE 0.3 ML: 10 INJECTION, SOLUTION EPIDURAL; INFILTRATION; INTRACAUDAL; PERINEURAL at 09:15

## 2019-05-15 RX ADMIN — PHENYLEPHRINE HYDROCHLORIDE 50 MCG: 10 INJECTION, SOLUTION INTRAMUSCULAR; INTRAVENOUS; SUBCUTANEOUS at 12:10

## 2019-05-15 RX ADMIN — ALBUTEROL SULFATE 2 PUFF: 90 AEROSOL, METERED RESPIRATORY (INHALATION) at 10:17

## 2019-05-15 RX ADMIN — PHENYLEPHRINE HYDROCHLORIDE 50 MCG: 10 INJECTION, SOLUTION INTRAMUSCULAR; INTRAVENOUS; SUBCUTANEOUS at 10:39

## 2019-05-15 RX ADMIN — ALBUTEROL SULFATE 2 PUFF: 90 AEROSOL, METERED RESPIRATORY (INHALATION) at 10:15

## 2019-05-15 RX ADMIN — SIMVASTATIN 20 MG: 5 TABLET, FILM COATED ORAL at 22:23

## 2019-05-15 RX ADMIN — HYDROMORPHONE HYDROCHLORIDE 0.3 MG: 1 INJECTION, SOLUTION INTRAMUSCULAR; INTRAVENOUS; SUBCUTANEOUS at 22:36

## 2019-05-15 RX ADMIN — ACETAMINOPHEN 975 MG: 325 TABLET, FILM COATED ORAL at 18:19

## 2019-05-15 RX ADMIN — VECURONIUM BROMIDE 1 MG: 1 INJECTION, POWDER, LYOPHILIZED, FOR SOLUTION INTRAVENOUS at 12:30

## 2019-05-15 RX ADMIN — QUETIAPINE 50 MG: 25 TABLET ORAL at 22:20

## 2019-05-15 RX ADMIN — HYDROMORPHONE HYDROCHLORIDE 0.3 MG: 1 INJECTION, SOLUTION INTRAMUSCULAR; INTRAVENOUS; SUBCUTANEOUS at 20:47

## 2019-05-15 RX ADMIN — FAMOTIDINE 20 MG: 10 INJECTION, SOLUTION INTRAVENOUS at 22:26

## 2019-05-15 RX ADMIN — ROCURONIUM BROMIDE 30 MG: 10 INJECTION INTRAVENOUS at 10:11

## 2019-05-15 RX ADMIN — PIPERACILLIN SODIUM,TAZOBACTAM SODIUM 3.38 G: 3; .375 INJECTION, POWDER, FOR SOLUTION INTRAVENOUS at 18:08

## 2019-05-15 RX ADMIN — DEXMEDETOMIDINE HYDROCHLORIDE 12 MCG: 100 INJECTION, SOLUTION INTRAVENOUS at 12:30

## 2019-05-15 RX ADMIN — FENTANYL CITRATE 50 MCG: 50 INJECTION, SOLUTION INTRAMUSCULAR; INTRAVENOUS at 10:08

## 2019-05-15 RX ADMIN — PHENYLEPHRINE HYDROCHLORIDE 100 MCG: 10 INJECTION, SOLUTION INTRAMUSCULAR; INTRAVENOUS; SUBCUTANEOUS at 11:52

## 2019-05-15 RX ADMIN — SODIUM CHLORIDE, POTASSIUM CHLORIDE, SODIUM LACTATE AND CALCIUM CHLORIDE: 600; 310; 30; 20 INJECTION, SOLUTION INTRAVENOUS at 09:15

## 2019-05-15 RX ADMIN — Medication 5 MG: at 10:21

## 2019-05-15 RX ADMIN — GABAPENTIN 300 MG: 300 CAPSULE ORAL at 22:23

## 2019-05-15 RX ADMIN — ERTAPENEM SODIUM 1 G: 1 INJECTION, POWDER, LYOPHILIZED, FOR SOLUTION INTRAMUSCULAR; INTRAVENOUS at 10:18

## 2019-05-15 RX ADMIN — SODIUM CHLORIDE, POTASSIUM CHLORIDE, SODIUM LACTATE AND CALCIUM CHLORIDE: 600; 310; 30; 20 INJECTION, SOLUTION INTRAVENOUS at 15:11

## 2019-05-15 RX ADMIN — OXYBUTYNIN CHLORIDE 15 MG: 5 TABLET, EXTENDED RELEASE ORAL at 18:19

## 2019-05-15 RX ADMIN — Medication 0.2 MG: at 13:59

## 2019-05-15 RX ADMIN — SENNOSIDES AND DOCUSATE SODIUM 1 TABLET: 8.6; 5 TABLET ORAL at 18:20

## 2019-05-15 RX ADMIN — ONDANSETRON 4 MG: 2 INJECTION INTRAMUSCULAR; INTRAVENOUS at 10:18

## 2019-05-15 RX ADMIN — FENTANYL CITRATE 50 MCG: 50 INJECTION, SOLUTION INTRAMUSCULAR; INTRAVENOUS at 10:41

## 2019-05-15 RX ADMIN — CITALOPRAM HYDROBROMIDE 20 MG: 20 TABLET ORAL at 22:21

## 2019-05-15 RX ADMIN — Medication 0.3 MG: at 13:36

## 2019-05-15 RX ADMIN — LEVOTHYROXINE SODIUM 88 MCG: 88 TABLET ORAL at 18:19

## 2019-05-15 RX ADMIN — ROCURONIUM BROMIDE 10 MG: 10 INJECTION INTRAVENOUS at 10:30

## 2019-05-15 RX ADMIN — VECURONIUM BROMIDE 1 MG: 1 INJECTION, POWDER, LYOPHILIZED, FOR SOLUTION INTRAVENOUS at 11:33

## 2019-05-15 RX ADMIN — PROPOFOL 35 MCG/KG/MIN: 10 INJECTION, EMULSION INTRAVENOUS at 10:16

## 2019-05-15 RX ADMIN — VECURONIUM BROMIDE 1 MG: 1 INJECTION, POWDER, LYOPHILIZED, FOR SOLUTION INTRAVENOUS at 12:14

## 2019-05-15 RX ADMIN — ALBUTEROL SULFATE 2 PUFF: 90 AEROSOL, METERED RESPIRATORY (INHALATION) at 12:34

## 2019-05-15 RX ADMIN — DEXAMETHASONE SODIUM PHOSPHATE 4 MG: 4 INJECTION, SOLUTION INTRA-ARTICULAR; INTRALESIONAL; INTRAMUSCULAR; INTRAVENOUS; SOFT TISSUE at 10:18

## 2019-05-15 RX ADMIN — GLYCOPYRROLATE 0.6 MG: 0.2 INJECTION, SOLUTION INTRAMUSCULAR; INTRAVENOUS at 12:46

## 2019-05-15 ASSESSMENT — ACTIVITIES OF DAILY LIVING (ADL)
AMBULATION: 0-->INDEPENDENT
TOILETING: 0-->INDEPENDENT
SWALLOWING: 0-->SWALLOWS FOODS/LIQUIDS WITHOUT DIFFICULTY
RETIRED_EATING: 0-->INDEPENDENT
ADLS_ACUITY_SCORE: 11
BATHING: 0-->INDEPENDENT
RETIRED_COMMUNICATION: 0-->UNDERSTANDS/COMMUNICATES WITHOUT DIFFICULTY
COGNITION: 0 - NO COGNITION ISSUES REPORTED
TRANSFERRING: 0-->INDEPENDENT
ADLS_ACUITY_SCORE: 11
FALL_HISTORY_WITHIN_LAST_SIX_MONTHS: NO
DRESS: 0-->INDEPENDENT

## 2019-05-15 ASSESSMENT — LIFESTYLE VARIABLES: TOBACCO_USE: 1

## 2019-05-15 ASSESSMENT — COPD QUESTIONNAIRES: COPD: 1

## 2019-05-15 ASSESSMENT — MIFFLIN-ST. JEOR: SCORE: 1321

## 2019-05-15 NOTE — ANESTHESIA POSTPROCEDURE EVALUATION
Patient: aSnta Yousif    Procedure(s):  ILEOSTOMY TAKEDOWN, LYSIS OF ADHESIONS, REPAIR OF INCISIONAL HERNIA,EXPLORATORY LAPAROTOMY    Diagnosis:ILEOSTOMY  Diagnosis Additional Information: No value filed.    Anesthesia Type:  General, ETT    Note:  Anesthesia Post Evaluation    Patient location during evaluation: PACU  Patient participation: Able to fully participate in evaluation  Level of consciousness: awake  Pain management: adequate  Airway patency: patent  Cardiovascular status: acceptable  Respiratory status: acceptable  Hydration status: acceptable  PONV: none             Last vitals:  Vitals:    05/15/19 1306 05/15/19 1310 05/15/19 1320   BP: 132/66 122/63 116/75   Pulse: 89 82 96   Resp: 26 20 24   Temp:   37.2  C (99  F)   SpO2: 93% 98% 97%         Electronically Signed By: Maciej Duron MD  May 15, 2019  1:36 PM

## 2019-05-15 NOTE — BRIEF OP NOTE
[facility]    Brief Operative Note    Pre-operative diagnosis: ILEOSTOMY  Post-operative diagnosis Ileostomy, Incisional Hernia, Intraabdominal Adhesions  Procedure: Procedure(s):  ILEOSTOMY TAKEDOWN, LYSIS OF ADHESIONS, REPAIR OF INCISIONAL HERNIA,EXPLORATORY LAPAROTOMY  Surgeon: Surgeon(s) and Role:     * Rudy Lee MD - Primary     * Ashwin Main PA-C - Assisting  Anesthesia: * No anesthesia type entered *   Estimated blood loss: 50 mL  Drains: None  Specimens: * No specimens in log *  Findings:   None.  Complications: None.  Implants:  * No implants in log *     Seprafilm used  See Operative Report for full details.  No complications noted.

## 2019-05-15 NOTE — ANESTHESIA CARE TRANSFER NOTE
Patient: Santa Yousif    Procedure(s):  ILEOSTOMY TAKEDOWN, LYSIS OF ADHESIONS, REPAIR OF INCISIONAL HERNIA,EXPLORATORY LAPAROTOMY    Diagnosis: ILEOSTOMY  Diagnosis Additional Information: No value filed.    Anesthesia Type:   General, ETT     Note:  Airway :Face Mask  Patient transferred to:PACU  Comments: Neuromuscular blockade reversed after TOF 4/4, spontaneous respirations, adequate tidal volumes, followed commands to voice, oropharynx suctioned with soft flexible catheter, extubated atraumatically, extubated with suction, airway patent after extubation.  Oxygen via facemask at 8 liters per minute to PACU. Oxygen tubing connected to wall O2 in PACU, SpO2, NiBP, and EKG monitors and alarms on and functioning, Remigio Hugger warmer connected to patient gown, report on patient's clinical status given to PACU RN, RN questions answered. Handoff Report: Identifed the Patient, Identified the Reponsible Provider, Reviewed the pertinent medical history, Discussed the surgical course, Reviewed Intra-OP anesthesia mangement and issues during anesthesia, Set expectations for post-procedure period and Allowed opportunity for questions and acknowledgement of understanding      Vitals: (Last set prior to Anesthesia Care Transfer)    CRNA VITALS  5/15/2019 1233 - 5/15/2019 1309      5/15/2019             Pulse:  82    SpO2:  99 %    Resp Rate (observed):  12                Electronically Signed By: СЕРГЕЙ Lopez CRNA  May 15, 2019  1:09 PM

## 2019-05-15 NOTE — ANESTHESIA PREPROCEDURE EVALUATION
Anesthesia Pre-Procedure Evaluation    Patient: Santa Yousif   MRN: [id number] : 1960          Preoperative Diagnosis: ILEOSTOMY    Procedure(s):  LAPAROSCOPIC ILEOSTOMY TAKE DOWN    Past Medical History:   Diagnosis Date     Abnormal cytological finding in specimen from cervix uteri      Anemia      Aortic heart murmur      COPD (chronic obstructive pulmonary disease) (H)      Diverticulitis      Enterocutaneous fistula      H/O ETOH abuse      H/O nicotine dependence      Hyperglycemia      Hyperlipidemia      Hypothyroidism      Ileostomy status (H)      Iron malabsorption      Lung cancer (H)      Macrocytosis      Paranoid schizophrenia (H)      Peripheral neuropathy      Pleural effusion      Urge incontinence      Urge incontinence      Past Surgical History:   Procedure Laterality Date     BIOPSY      breast     GENITOURINARY SURGERY      cystotomy     GI SURGERY      expl lap     GI SURGERY      colon resection     GI SURGERY      ileostomy     GI SURGERY      lap surgical closure of enterostomy, lg or small intestine,w resectio and anastamosis     GYN SURGERY      cryocautery of cervix     HERNIA REPAIR      advanced lap insiional hernia repair     LOBECTOMY LUNG Left 2018    Procedure: LOBECTOMY LUNG;;  Surgeon: Miguel Jauregui MD;  Location: [facility]     THORACOTOMY Left 2018    Procedure: THORACOTOMY;  LEFT THORACOTOMY,  LEFT UPPER LOBECTOMY,Mediastin lymph node disection ;  Surgeon: Miguel Jauregui MD;  Location: [facility] OR     VASCULAR SURGERY      embolectomy or thrombectomy       Anesthesia Evaluation     . Pt has had prior anesthetic. Type: General    No history of anesthetic complications          ROS/MED HX    ENT/Pulmonary:     (+)tobacco use (30+ pack year), Current use 1 packs/day  COPD, , . .   (-) recent URI   Neurologic: Comment: Migraine occasional, not on meds    (+)neuropathy migraines,     Cardiovascular:     (+) Dyslipidemia, ----. : . . . :. valvular  "problems/murmurs . Previous cardiac testing Echodate:9/14/18results:TTE - EF 60-65%, no valvular problemsdate: results: date: results: date: results:          METS/Exercise Tolerance:  3 - Able to walk 1-2 blocks without stopping   Hematologic:  - neg hematologic  ROS   (+) Anemia, -      Musculoskeletal:         GI/Hepatic:        (-) GERD and liver disease   Renal/Genitourinary:      (-) renal disease   Endo:     (+) thyroid problem hypothyroidism, .   (-) Type II DM and obesity   Psychiatric:     (+) psychiatric history schizophrenia and other (comment) (EtOH abuse; paranoid schizophrenia)      Infectious Disease:         Malignancy:   (+) Malignancy History of Lung          Other:                          Physical Exam  Normal systems: cardiovascular    Airway   Mallampati: II  TM distance: >3 FB  Neck ROM: full    Dental   (+) missing    Cardiovascular       Pulmonary (+) decreased breath sounds               Lab Results   Component Value Date    WBC 8.1 09/21/2018    HGB 13.7 09/21/2018    HCT 40.5 09/21/2018     09/21/2018     09/21/2018    POTASSIUM 4.2 09/21/2018    CHLORIDE 107 09/21/2018    CO2 28 09/21/2018    BUN 6 (L) 09/21/2018    CR 0.71 09/21/2018    GLC 93 09/21/2018    YOUSIF 8.9 09/21/2018    INR 0.95 09/21/2018       Preop Vitals  BP Readings from Last 3 Encounters:   03/27/19 100/60   09/23/18 141/90    Pulse Readings from Last 3 Encounters:   03/27/19 70   09/23/18 73      Resp Readings from Last 3 Encounters:   03/27/19 16   09/23/18 16    SpO2 Readings from Last 3 Encounters:   09/23/18 93%      Temp Readings from Last 1 Encounters:   09/23/18 36.6  C (97.9  F) (Oral)    Ht Readings from Last 1 Encounters:   03/27/19 1.676 m (5' 6\")      Wt Readings from Last 1 Encounters:   03/27/19 75.8 kg (167 lb)    Estimated body mass index is 26.95 kg/m  as calculated from the following:    Height as of 3/27/19: 1.676 m (5' 6\").    Weight as of 3/27/19: 75.8 kg (167 lb).       Anesthesia " Plan      History & Physical Review  History and physical reviewed and following examination; no interval change.    ASA Status:  2 .    NPO Status:  > 8 hours    Plan for General and ETT with Intravenous and Propofol induction. Maintenance will be Balanced.    PONV prophylaxis:  Ondansetron (or other 5HT-3), Dexamethasone or Solumedrol and Other (See comment) (Propofol gtt)       Postoperative Care  Postoperative pain management:  IV analgesics, Oral pain medications and Multi-modal analgesia.      Consents  Anesthetic plan, risks, benefits and alternatives discussed with:  Patient..                 Maciej Duron MD

## 2019-05-16 ENCOUNTER — APPOINTMENT (OUTPATIENT)
Dept: PHYSICAL THERAPY | Facility: CLINIC | Age: 59
DRG: 330 | End: 2019-05-16
Attending: PHYSICIAN ASSISTANT
Payer: MEDICARE

## 2019-05-16 LAB
ANION GAP SERPL CALCULATED.3IONS-SCNC: 3 MMOL/L (ref 3–14)
BASOPHILS # BLD AUTO: 0 10E9/L (ref 0–0.2)
BASOPHILS NFR BLD AUTO: 0.1 %
BUN SERPL-MCNC: 9 MG/DL (ref 7–30)
CALCIUM SERPL-MCNC: 8.7 MG/DL (ref 8.5–10.1)
CHLORIDE SERPL-SCNC: 105 MMOL/L (ref 94–109)
CO2 SERPL-SCNC: 29 MMOL/L (ref 20–32)
CREAT SERPL-MCNC: 0.62 MG/DL (ref 0.52–1.04)
DIFFERENTIAL METHOD BLD: ABNORMAL
EOSINOPHIL # BLD AUTO: 0 10E9/L (ref 0–0.7)
EOSINOPHIL NFR BLD AUTO: 0.2 %
ERYTHROCYTE [DISTWIDTH] IN BLOOD BY AUTOMATED COUNT: 13.8 % (ref 10–15)
GFR SERPL CREATININE-BSD FRML MDRD: >90 ML/MIN/{1.73_M2}
GLUCOSE SERPL-MCNC: 132 MG/DL (ref 70–99)
HCT VFR BLD AUTO: 42.6 % (ref 35–47)
HGB BLD-MCNC: 13.9 G/DL (ref 11.7–15.7)
IMM GRANULOCYTES # BLD: 0 10E9/L (ref 0–0.4)
IMM GRANULOCYTES NFR BLD: 0.3 %
LYMPHOCYTES # BLD AUTO: 0.6 10E9/L (ref 0.8–5.3)
LYMPHOCYTES NFR BLD AUTO: 6.6 %
MCH RBC QN AUTO: 32.7 PG (ref 26.5–33)
MCHC RBC AUTO-ENTMCNC: 32.6 G/DL (ref 31.5–36.5)
MCV RBC AUTO: 100 FL (ref 78–100)
MONOCYTES # BLD AUTO: 0.8 10E9/L (ref 0–1.3)
MONOCYTES NFR BLD AUTO: 8.7 %
NEUTROPHILS # BLD AUTO: 7.4 10E9/L (ref 1.6–8.3)
NEUTROPHILS NFR BLD AUTO: 84.1 %
NRBC # BLD AUTO: 0 10*3/UL
NRBC BLD AUTO-RTO: 0 /100
PLATELET # BLD AUTO: 287 10E9/L (ref 150–450)
POTASSIUM SERPL-SCNC: 4 MMOL/L (ref 3.4–5.3)
RBC # BLD AUTO: 4.25 10E12/L (ref 3.8–5.2)
SODIUM SERPL-SCNC: 137 MMOL/L (ref 133–144)
WBC # BLD AUTO: 8.8 10E9/L (ref 4–11)

## 2019-05-16 PROCEDURE — 25000125 ZZHC RX 250: Performed by: PHYSICIAN ASSISTANT

## 2019-05-16 PROCEDURE — 12000000 ZZH R&B MED SURG/OB

## 2019-05-16 PROCEDURE — A9270 NON-COVERED ITEM OR SERVICE: HCPCS | Performed by: PHYSICIAN ASSISTANT

## 2019-05-16 PROCEDURE — 25000132 ZZH RX MED GY IP 250 OP 250 PS 637: Performed by: PHYSICIAN ASSISTANT

## 2019-05-16 PROCEDURE — 36415 COLL VENOUS BLD VENIPUNCTURE: CPT | Performed by: PHYSICIAN ASSISTANT

## 2019-05-16 PROCEDURE — 97530 THERAPEUTIC ACTIVITIES: CPT | Mod: GP

## 2019-05-16 PROCEDURE — 80048 BASIC METABOLIC PNL TOTAL CA: CPT | Performed by: PHYSICIAN ASSISTANT

## 2019-05-16 PROCEDURE — 85025 COMPLETE CBC W/AUTO DIFF WBC: CPT | Performed by: PHYSICIAN ASSISTANT

## 2019-05-16 PROCEDURE — 97161 PT EVAL LOW COMPLEX 20 MIN: CPT | Mod: GP

## 2019-05-16 PROCEDURE — 25800030 ZZH RX IP 258 OP 636: Performed by: PHYSICIAN ASSISTANT

## 2019-05-16 PROCEDURE — 97116 GAIT TRAINING THERAPY: CPT | Mod: GP

## 2019-05-16 PROCEDURE — 25000128 H RX IP 250 OP 636: Performed by: PHYSICIAN ASSISTANT

## 2019-05-16 RX ADMIN — FAMOTIDINE 20 MG: 10 INJECTION, SOLUTION INTRAVENOUS at 08:49

## 2019-05-16 RX ADMIN — GABAPENTIN 300 MG: 300 CAPSULE ORAL at 08:50

## 2019-05-16 RX ADMIN — LAMOTRIGINE 100 MG: 100 TABLET ORAL at 08:50

## 2019-05-16 RX ADMIN — SENNOSIDES AND DOCUSATE SODIUM 1 TABLET: 8.6; 5 TABLET ORAL at 20:32

## 2019-05-16 RX ADMIN — PIPERACILLIN SODIUM,TAZOBACTAM SODIUM 3.38 G: 3; .375 INJECTION, POWDER, FOR SOLUTION INTRAVENOUS at 00:41

## 2019-05-16 RX ADMIN — ACETAMINOPHEN 975 MG: 325 TABLET, FILM COATED ORAL at 07:54

## 2019-05-16 RX ADMIN — Medication 1 LOZENGE: at 20:42

## 2019-05-16 RX ADMIN — CITALOPRAM HYDROBROMIDE 20 MG: 20 TABLET ORAL at 20:32

## 2019-05-16 RX ADMIN — ENOXAPARIN SODIUM 40 MG: 40 INJECTION SUBCUTANEOUS at 08:50

## 2019-05-16 RX ADMIN — HYDROMORPHONE HYDROCHLORIDE 0.5 MG: 1 INJECTION, SOLUTION INTRAMUSCULAR; INTRAVENOUS; SUBCUTANEOUS at 09:05

## 2019-05-16 RX ADMIN — QUETIAPINE 50 MG: 25 TABLET ORAL at 20:32

## 2019-05-16 RX ADMIN — PIPERACILLIN SODIUM,TAZOBACTAM SODIUM 3.38 G: 3; .375 INJECTION, POWDER, FOR SOLUTION INTRAVENOUS at 07:53

## 2019-05-16 RX ADMIN — SIMVASTATIN 20 MG: 5 TABLET, FILM COATED ORAL at 20:31

## 2019-05-16 RX ADMIN — THIOTHIXENE 5 MG: 5 CAPSULE ORAL at 20:32

## 2019-05-16 RX ADMIN — LIDOCAINE: 40 CREAM TOPICAL at 12:29

## 2019-05-16 RX ADMIN — GABAPENTIN 300 MG: 300 CAPSULE ORAL at 20:32

## 2019-05-16 RX ADMIN — SODIUM CHLORIDE, POTASSIUM CHLORIDE, SODIUM LACTATE AND CALCIUM CHLORIDE: 600; 310; 30; 20 INJECTION, SOLUTION INTRAVENOUS at 07:55

## 2019-05-16 RX ADMIN — DIVALPROEX SODIUM 500 MG: 500 TABLET, DELAYED RELEASE ORAL at 20:32

## 2019-05-16 RX ADMIN — LEVOTHYROXINE SODIUM 88 MCG: 88 TABLET ORAL at 07:54

## 2019-05-16 RX ADMIN — ACETAMINOPHEN 975 MG: 325 TABLET, FILM COATED ORAL at 16:55

## 2019-05-16 RX ADMIN — SODIUM CHLORIDE, POTASSIUM CHLORIDE, SODIUM LACTATE AND CALCIUM CHLORIDE: 600; 310; 30; 20 INJECTION, SOLUTION INTRAVENOUS at 18:27

## 2019-05-16 RX ADMIN — SENNOSIDES AND DOCUSATE SODIUM 1 TABLET: 8.6; 5 TABLET ORAL at 08:50

## 2019-05-16 RX ADMIN — HYDROMORPHONE HYDROCHLORIDE 0.3 MG: 1 INJECTION, SOLUTION INTRAMUSCULAR; INTRAVENOUS; SUBCUTANEOUS at 04:04

## 2019-05-16 RX ADMIN — OXYBUTYNIN CHLORIDE 15 MG: 5 TABLET, EXTENDED RELEASE ORAL at 08:49

## 2019-05-16 RX ADMIN — RANITIDINE 150 MG: 150 TABLET ORAL at 20:33

## 2019-05-16 RX ADMIN — HYDROMORPHONE HYDROCHLORIDE 0.3 MG: 1 INJECTION, SOLUTION INTRAMUSCULAR; INTRAVENOUS; SUBCUTANEOUS at 17:14

## 2019-05-16 RX ADMIN — PIPERACILLIN SODIUM,TAZOBACTAM SODIUM 3.38 G: 3; .375 INJECTION, POWDER, FOR SOLUTION INTRAVENOUS at 20:33

## 2019-05-16 RX ADMIN — PIPERACILLIN SODIUM,TAZOBACTAM SODIUM 3.38 G: 3; .375 INJECTION, POWDER, FOR SOLUTION INTRAVENOUS at 14:00

## 2019-05-16 RX ADMIN — HYDROMORPHONE HYDROCHLORIDE 0.3 MG: 1 INJECTION, SOLUTION INTRAMUSCULAR; INTRAVENOUS; SUBCUTANEOUS at 12:26

## 2019-05-16 ASSESSMENT — ACTIVITIES OF DAILY LIVING (ADL)
ADLS_ACUITY_SCORE: 14
ADLS_ACUITY_SCORE: 15
ADLS_ACUITY_SCORE: 14

## 2019-05-16 ASSESSMENT — MIFFLIN-ST. JEOR: SCORE: 1337

## 2019-05-16 NOTE — PROGRESS NOTES
Spoke with DEEJAY Main about patient having to be straight cathed NOC and bladder scan volume at 930 over 300. Order to leave indwelling catheter in if patient is unable to void

## 2019-05-16 NOTE — PLAN OF CARE
VSS on 3L O2, disoriented to situation. Lung sounds diminished, BS hypoactive and faint, - flatus. Midline incision and ostomy takedown site UTV - marked drainage with no change. Pain was controlled with IV Dilaudid. NG to low intermittent suction - plan to clamp at 0000. Patient had not voided since straight cath at 0220, bladder scanned for 341ml, discussed with Gen Surgery PA and orders for ruiz placed. Plan to keep in place overnight. Up with assist of 1+ walker/GB. NPO ex ice chips and sips of water.

## 2019-05-16 NOTE — PLAN OF CARE
Discharge Planner PT   Patient plan for discharge: home, pt states her sisters will be available to come by and help during the day for a few days  Current status: PT orders received, eval completed, treatment initiated. Pt is 58 y.o. F POD #1 Ileostomy Takedown with Ex Lap and Extensive NI. Pt lives alone in apartment with elevator access. Pt states her sisters will be able to help for a few days upon returning home, though unclear what level of support will be available as pt is lethargic. Pt previously ind with all mobility, does not own walker and states she does not want one.  Currently, pt rec supine in bed, oriented x3 but lethargic. Pt just returned from bathroom with nursing, states she is fatigued but is agreeable to PT. Reports 5/10 abdominal pain; pt requesting pain medication, RN notified. Pt receiving 4L O2 via Nc, SpO2 97 at start of session. Performed supine> sit with cues for log roll technique. Sit>stand with SBA, some unsteadiness standing. Abdominal binder donned standing. Pt ambulated 1 x 30', 1x 50' with B UE support on IV pole and CGA. Unsteady with short step length, slow pace. Pt reports feeling fatigued, weak with ambulation. SpO2 90-92 while ambulating with 4 L O2 via NC , 88 seated post gait, recovering to 93 with ~1' rest. Sit> supine with cues for log roll technique and SBA. Pt supine in bed upon departure of PT. FWW provided in room.     Barriers to return to prior living situation: lives alone, decreased activity tolerance, fatigues easily, current level of assist  Recommendations for discharge: TCU  Rationale for recommendations: Patient with unsteady ambulation and limited tolerance of ambulation at this time, currently recommend TCU to progress safety and independence with mobility prior to returning home. Patient may be candidate to return home if progresses to Mod Ind ambulation of household distances. Pt will likely need FWW and assist of sisters for household tasks if pt  returns home.        Entered by: Adrianna Morrow 05/16/2019 12:14 PM

## 2019-05-16 NOTE — PROGRESS NOTES
"General Surgery Note    Stable S/P Ileostomy Takedown with Ex Lap and Extensive NI  POD1    -Keep NGT.  Ice chips, sips of water ok.  Clamp NGT overnight.  -Dilaudid IV for pain  -Antiemetics PRN  -Lovenox and PCDs  -Add Abdominal binder.  -Urinary retention noted.  Needed ruiz from OR.  No more straight caths.  Place and leave ruiz in overnight.  Likely remove tomorrow.  -Ice or heat to abdomen.  -PT consult for weakness, abdominal pain.  Can see this afternoon or tomorrow am may be more appropriate if still weak.  -Continue IV Zosyn as contamination of stool during case.  Monitor incision for erythema.    Doing ok.  More pain than she anticipated.  C/o taste of her pills.      NAD but very weak and tired.  Barely opens eyes (Rt swollen?), and voice is weak and quiet  Not moving much and very slow movements of arms when she does  /66   Pulse 84   Temp 99.2  F (37.3  C) (Oral)   Resp 18   Ht 1.626 m (5' 4\")   Wt 78.2 kg (172 lb 6.4 oz)   SpO2 94%   BMI 29.59 kg/m      Intake/Output Summary (Last 24 hours) at 5/16/2019 1026  Last data filed at 5/16/2019 0600  Gross per 24 hour   Intake 3432.08 ml   Output 1150 ml   Net 2282.08 ml     Abd: mildly distended, appropriate tenderness, No BS  Inc: staples  NGT: light green in color; 550 since surgery    BMP: ok except glucose 132  Hgb: 13.9  WBC: 8.8      "

## 2019-05-16 NOTE — PLAN OF CARE
A/Ox4. VSS. Max temp of 99.1 LS diminished. -bs, -flatus. Straight cathed at 0220 for 500. Dressing with scant amount of drainage on dressing marked. Up with assist x2 gait belt. Managing pain with prn dilaudid. NG to LIS. NPO with ice chips.

## 2019-05-17 ENCOUNTER — APPOINTMENT (OUTPATIENT)
Dept: PHYSICAL THERAPY | Facility: CLINIC | Age: 59
DRG: 330 | End: 2019-05-17
Attending: PHYSICIAN ASSISTANT
Payer: MEDICARE

## 2019-05-17 LAB — GLUCOSE BLDC GLUCOMTR-MCNC: 121 MG/DL (ref 70–99)

## 2019-05-17 PROCEDURE — 12000000 ZZH R&B MED SURG/OB

## 2019-05-17 PROCEDURE — 25800030 ZZH RX IP 258 OP 636: Performed by: PHYSICIAN ASSISTANT

## 2019-05-17 PROCEDURE — 97116 GAIT TRAINING THERAPY: CPT | Mod: GP

## 2019-05-17 PROCEDURE — 00000146 ZZHCL STATISTIC GLUCOSE BY METER IP

## 2019-05-17 PROCEDURE — 97110 THERAPEUTIC EXERCISES: CPT | Mod: GP

## 2019-05-17 PROCEDURE — A9270 NON-COVERED ITEM OR SERVICE: HCPCS | Performed by: PHYSICIAN ASSISTANT

## 2019-05-17 PROCEDURE — 25000128 H RX IP 250 OP 636: Performed by: PHYSICIAN ASSISTANT

## 2019-05-17 PROCEDURE — 25000128 H RX IP 250 OP 636: Performed by: SURGERY

## 2019-05-17 PROCEDURE — 25000132 ZZH RX MED GY IP 250 OP 250 PS 637: Performed by: PHYSICIAN ASSISTANT

## 2019-05-17 PROCEDURE — 25000131 ZZH RX MED GY IP 250 OP 636 PS 637: Performed by: PHYSICIAN ASSISTANT

## 2019-05-17 RX ADMIN — SODIUM CHLORIDE, POTASSIUM CHLORIDE, SODIUM LACTATE AND CALCIUM CHLORIDE: 600; 310; 30; 20 INJECTION, SOLUTION INTRAVENOUS at 12:46

## 2019-05-17 RX ADMIN — RANITIDINE 150 MG: 150 TABLET ORAL at 08:10

## 2019-05-17 RX ADMIN — GABAPENTIN 300 MG: 300 CAPSULE ORAL at 21:20

## 2019-05-17 RX ADMIN — ENOXAPARIN SODIUM 40 MG: 40 INJECTION SUBCUTANEOUS at 08:09

## 2019-05-17 RX ADMIN — PIPERACILLIN SODIUM,TAZOBACTAM SODIUM 3.38 G: 3; .375 INJECTION, POWDER, FOR SOLUTION INTRAVENOUS at 13:39

## 2019-05-17 RX ADMIN — ACETAMINOPHEN 975 MG: 325 TABLET, FILM COATED ORAL at 08:10

## 2019-05-17 RX ADMIN — SENNOSIDES AND DOCUSATE SODIUM 2 TABLET: 8.6; 5 TABLET ORAL at 08:09

## 2019-05-17 RX ADMIN — SODIUM CHLORIDE, POTASSIUM CHLORIDE, SODIUM LACTATE AND CALCIUM CHLORIDE: 600; 310; 30; 20 INJECTION, SOLUTION INTRAVENOUS at 02:22

## 2019-05-17 RX ADMIN — PIPERACILLIN SODIUM,TAZOBACTAM SODIUM 3.38 G: 3; .375 INJECTION, POWDER, FOR SOLUTION INTRAVENOUS at 21:29

## 2019-05-17 RX ADMIN — SODIUM CHLORIDE, POTASSIUM CHLORIDE, SODIUM LACTATE AND CALCIUM CHLORIDE: 600; 310; 30; 20 INJECTION, SOLUTION INTRAVENOUS at 21:29

## 2019-05-17 RX ADMIN — ONDANSETRON 4 MG: 4 TABLET, ORALLY DISINTEGRATING ORAL at 10:38

## 2019-05-17 RX ADMIN — ACETAMINOPHEN 975 MG: 325 TABLET, FILM COATED ORAL at 16:12

## 2019-05-17 RX ADMIN — SENNOSIDES AND DOCUSATE SODIUM 2 TABLET: 8.6; 5 TABLET ORAL at 21:21

## 2019-05-17 RX ADMIN — GABAPENTIN 300 MG: 300 CAPSULE ORAL at 08:10

## 2019-05-17 RX ADMIN — ACETAMINOPHEN 975 MG: 325 TABLET, FILM COATED ORAL at 00:13

## 2019-05-17 RX ADMIN — CITALOPRAM HYDROBROMIDE 20 MG: 20 TABLET ORAL at 21:21

## 2019-05-17 RX ADMIN — RANITIDINE 150 MG: 150 TABLET ORAL at 21:21

## 2019-05-17 RX ADMIN — SIMVASTATIN 20 MG: 5 TABLET, FILM COATED ORAL at 21:20

## 2019-05-17 RX ADMIN — LAMOTRIGINE 100 MG: 100 TABLET ORAL at 08:10

## 2019-05-17 RX ADMIN — PIPERACILLIN SODIUM,TAZOBACTAM SODIUM 3.38 G: 3; .375 INJECTION, POWDER, FOR SOLUTION INTRAVENOUS at 08:09

## 2019-05-17 RX ADMIN — PIPERACILLIN SODIUM,TAZOBACTAM SODIUM 3.38 G: 3; .375 INJECTION, POWDER, FOR SOLUTION INTRAVENOUS at 02:21

## 2019-05-17 RX ADMIN — LEVOTHYROXINE SODIUM 88 MCG: 88 TABLET ORAL at 06:36

## 2019-05-17 RX ADMIN — SODIUM CHLORIDE 500 ML: 9 INJECTION, SOLUTION INTRAVENOUS at 03:33

## 2019-05-17 RX ADMIN — HYDROMORPHONE HYDROCHLORIDE 0.3 MG: 1 INJECTION, SOLUTION INTRAMUSCULAR; INTRAVENOUS; SUBCUTANEOUS at 05:53

## 2019-05-17 RX ADMIN — QUETIAPINE 50 MG: 25 TABLET ORAL at 21:21

## 2019-05-17 RX ADMIN — DIVALPROEX SODIUM 500 MG: 500 TABLET, DELAYED RELEASE ORAL at 21:20

## 2019-05-17 RX ADMIN — THIOTHIXENE 5 MG: 5 CAPSULE ORAL at 21:20

## 2019-05-17 RX ADMIN — OXYBUTYNIN CHLORIDE 15 MG: 5 TABLET, EXTENDED RELEASE ORAL at 08:10

## 2019-05-17 ASSESSMENT — ACTIVITIES OF DAILY LIVING (ADL)
ADLS_ACUITY_SCORE: 16
ADLS_ACUITY_SCORE: 15
ADLS_ACUITY_SCORE: 16
ADLS_ACUITY_SCORE: 16

## 2019-05-17 NOTE — PLAN OF CARE
Dx:closure of ileostomy, lysis of adhesions, incisional hernia repair Hx:ETOH, schizophrenia, lung cancer, tremors VS:Stable on 1L NC. A/O:x4. Labs:WDL. Incisions:Abdominal incision MEGHNA with staples. No drainage. Cover with gauze if needed. CWMS:WDL. Pain level/controlled with:IV dilaudid. Up with:Ax1 walker and GB. Tolerating clear liquids tolerating well. Small amount of nausea controlled with PO zofran. No gas. BM:No. Vega Catheter in place. Is:using at bedside. Gi:active x4. NG removed at 0800 this morning. Resp:clear lung sounds. Plan: Possible TCU placement. Will continue monitor.

## 2019-05-17 NOTE — PLAN OF CARE
Pt is alert, disoriented to situatuion, VSS ex on O2 @ 1.5 LPM via NC, PRN Dilaudid for pain management. NG clamped at 0000, landmark unchanged. Midline dsg has scant amount of dried serosanguinous drainage- previously outlined- no changed. Dsg over old stoma site has small amount of dried serosanguinous drainage outlined- has not changed. Pt is up with assist x1, is NPO. LS diminished, BS+, flatus-, ruiz in place; is intact and patent, adequate UOP.

## 2019-05-17 NOTE — PLAN OF CARE
Alert, disoriented to situation. VSS on 2L. Flat affect, slow to respond. VSS on 2L. LS clear, bowel sounds active. Not passing gas. Vega in place. NPO except for ice chips and sips of water. Can take meds whole. NG tube set to intermittent suction, to be clamped at midnight. Denies pain except for with movement, declines any interventions. Ax1 with gait belt and walker.

## 2019-05-17 NOTE — PLAN OF CARE
Discharge Planner PT   Patient plan for discharge: Did not discuss  Current status: STS at CGA, gait x 90 ft using RW and CGA for safety. Pt participated in seated LE there ex with PT's guidance. Pt noted to be mildly impulsive not being aware of lines attached.   Barriers to return to prior living situation: Lives alone, Decreased strength and tolerance to ambulation, assistance needed with mobility.   Recommendations for discharge: TCU  Rationale for recommendations: Pt will benefit from continued skilled PT at a TCU to achieve PLOF and functional independence.        Entered by: Florencia Pyle 05/17/2019 8:51 AM

## 2019-05-17 NOTE — PROGRESS NOTES
SPIRITUAL HEALTH SERVICES Progress Note  [facility]    Initial visit with pt Santa per pt's request.     Pt reported that she had surgery and stated that she is very sleepy this moment and requested prayer of healing, and  provided it.       let pt know that [facility] is available.     I and [facility] remain available if additional needs arise.       Bernice Mena  Chaplain Resident

## 2019-05-17 NOTE — OP NOTE
Preoperative diagnosis: Ileostomy    Postoperative diagnosis: Same    Procedure: Exploratory laparotomy, extensive lysis of intra-abdominal adhesions takedown of ileostomy with ileal colostomy, primary repair of recurrent incisional hernia    Surgeon: Rudy Lee MD    Assistant: Dhiraj Main PA-C    Anesthesia: General endotracheal    Estimated blood loss: 50 cc    Specimens: None    Indication for procedure: This is a 58-year-old female with a complex past surgical history.  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  This required abdominal exploration with removal of the mesh and resection of small bowel.  This operation resulted in a anastomotic disruption and leak.  She ultimately then underwent ileostomy creation.  She has lived with this for many years.  She presented to my office desiring to have this reversed.  She was studied and found to have no evidence of distal obstruction.  We therefore elected to proceed with ileostomy takedown.  Risks, benefits and alternatives were thoroughly reviewed.  Her questions were answered and she consented to proceed.    Procedure: After informed consent was obtained the patient was taken to the operating room and placed supine in the operating room table.  Following induction of adequate general endotracheal anesthesia a Vega catheter was placed using aseptic technique and the patient's  ileostomy was sutured closed with a 3-0 Vicryl suture.  The abdomen was then prepped and draped in usual manner with the ileostomy itself being painted with Betadine.  Surgeon initiated timeout was completed 1 g of IV Invanz was administered.  I started out then by mobilizing the ileostomy at the mucocutaneous junction.  This was accomplished using electrocautery.  I mobilized the bowel down to the level of the fascia.  The prior  operative note had indicated that this was a double barrel ileostomy and this was indeed confirmed when this was mobilized.  There was a superior opening that was a mucous fistula and the more inferior one was the functional end of the ileostomy.  After I mobilized this adequately I resected these 2 ends using the ESPERANZA-75 stapler.  I then palpated intra-abdominally around the margins of the fascia and could feel that there were dense intra-abdominal adhesions which would preclude simply re-anastomosing the small bowel and dunking it into the abdomen.  I therefore elected to proceed with abdominal exploration.  Midline incision was made through the prior scar and cutdown was taken to the abdominal fascia.  This was sharply divided and there was immediately evident small bowel adherent to the underside of the incision.  There were in fact relatively dense adhesions along the entire underside of her prior incision.  I carefully took down these adhesions such that I was able to adequately open the fascia to receded with laparotomy.  Due to the dense nature of these adhesions there was one area of the small bowel where a serosal non-full-thickness injury occurred as result of adhesio lysis and was unavoidable due to the dense nature of the adhesions.  This was repaired with multiple interrupted sutures of 3-0 Vicryl.  We then proceeded with extensive adhesio lysis taking down all of her intra-abdominal adhesions to the anterior abdominal wall as well as the interloop adhesions until such time as the small bowel could be run from the ligament of Treitz to the ileocecal valve.  The distal portion of the small bowel which is been present as the mucous fistula was quite short and measured only approximately 10 cm.  I do not believe that this length of bowel nor its configuration would allow for a reasonably functional anastomosis I therefore performed a resection removing the ileocecal valve and this length of bowel that had  been the mucous fistula.  A side-to-side functional end-to-end anastomosis was then created from the small bowel to the proximal ascending colon.  This was accomplished using the ESPERANZA-75 stapler.  Enterotomies were oversewn in a 2 layer fashion using 3-0 Vicryl suture.  The mesenteric defect was also closed with 3-0 Vicryl suture.  The abdominal cavity was then copiously lavaged until the effluent was essentially clear.  The viscera were all then returned to the abdominal cavity.  Patient was noted at the time of opening the abdomen to have a recurrent incisional hernia to the right of midline in the upper abdomen.  I therefore resected the patient's old scar and identified the fascial margins.  The old ostomy site was then closed with interrupted #2 Vicryl suture closing both the posterior and anterior sheaths.  Seprafilm procedure pack was then placed on the anterior surface of the abdominal contents after placement of an NG tube with its tip being palpated within the body of the stomach.  The midline fascia was then closed primarily with a running 0 Maxon suture.  There was modest tension in the upper abdomen where the recurrent incisional hernia had been found.  The wound was then irrigated and closed with staples.  Sponge, needle and instrument counts are correct.  Patient tolerated this well.  She was awakened in the operating room, extubated and taken to recovery room in a stable condition.    Rudy Lee MD

## 2019-05-17 NOTE — PROGRESS NOTES
"General Surgery Note    Stable S/P Ileostomy Takedown  POD2    -Tolerated NGT clamp.  Remove NGT.  -Start clears.  Hold advancing until ROBF.  -Continue to work on pain and wean narcotics as able  -Appreciate PT working with pt.  TCU recommended at discharge.  -Up to ambulate qshift at minimum.  -Dressing removed.  Replace PRN drainage  -Dispo: TCU likely, 2-3 days.    Doing \"about the same\".  Pain scores are better.  Up with assist of 1 yesterday afternoon, so seems at least a little stronger.  Denies N/V with NG clamped.  Is a little thirsty/hungry.  No flatus or BM yet.    NAD, pleasant, heavy sleeper but wakes to voice/light touch and then more alert  BP 95/58   Pulse 69   Temp 97.4  F (36.3  C) (Oral)   Resp 16   Ht 1.626 m (5' 4\")   Wt 78.2 kg (172 lb 6.4 oz)   SpO2 95%   BMI 29.59 kg/m      Intake/Output Summary (Last 24 hours) at 5/17/2019 0808  Last data filed at 5/17/2019 0600  Gross per 24 hour   Intake 60 ml   Output 1350 ml   Net -1290 ml     Abd: soft, ND, approp tender, ++BS  Inc: CDI with pink/bruising between two separate incisions.  Stapled.  "

## 2019-05-18 LAB
CREAT SERPL-MCNC: 0.62 MG/DL (ref 0.52–1.04)
ERYTHROCYTE [DISTWIDTH] IN BLOOD BY AUTOMATED COUNT: 14.2 % (ref 10–15)
GFR SERPL CREATININE-BSD FRML MDRD: >90 ML/MIN/{1.73_M2}
HCT VFR BLD AUTO: 34 % (ref 35–47)
HGB BLD-MCNC: 10.8 G/DL (ref 11.7–15.7)
MCH RBC QN AUTO: 32.9 PG (ref 26.5–33)
MCHC RBC AUTO-ENTMCNC: 31.8 G/DL (ref 31.5–36.5)
MCV RBC AUTO: 104 FL (ref 78–100)
PLATELET # BLD AUTO: 247 10E9/L (ref 150–450)
RBC # BLD AUTO: 3.28 10E12/L (ref 3.8–5.2)
WBC # BLD AUTO: 11.4 10E9/L (ref 4–11)

## 2019-05-18 PROCEDURE — 12000000 ZZH R&B MED SURG/OB

## 2019-05-18 PROCEDURE — 25000132 ZZH RX MED GY IP 250 OP 250 PS 637: Performed by: PHYSICIAN ASSISTANT

## 2019-05-18 PROCEDURE — 85027 COMPLETE CBC AUTOMATED: CPT | Performed by: PHYSICIAN ASSISTANT

## 2019-05-18 PROCEDURE — 25000128 H RX IP 250 OP 636: Performed by: PHYSICIAN ASSISTANT

## 2019-05-18 PROCEDURE — 25800030 ZZH RX IP 258 OP 636: Performed by: PHYSICIAN ASSISTANT

## 2019-05-18 PROCEDURE — 36415 COLL VENOUS BLD VENIPUNCTURE: CPT | Performed by: PHYSICIAN ASSISTANT

## 2019-05-18 PROCEDURE — A9270 NON-COVERED ITEM OR SERVICE: HCPCS | Performed by: PHYSICIAN ASSISTANT

## 2019-05-18 PROCEDURE — 25000131 ZZH RX MED GY IP 250 OP 636 PS 637: Performed by: PHYSICIAN ASSISTANT

## 2019-05-18 PROCEDURE — 82565 ASSAY OF CREATININE: CPT | Performed by: PHYSICIAN ASSISTANT

## 2019-05-18 RX ADMIN — PIPERACILLIN SODIUM,TAZOBACTAM SODIUM 3.38 G: 3; .375 INJECTION, POWDER, FOR SOLUTION INTRAVENOUS at 20:35

## 2019-05-18 RX ADMIN — GABAPENTIN 300 MG: 300 CAPSULE ORAL at 20:36

## 2019-05-18 RX ADMIN — GABAPENTIN 300 MG: 300 CAPSULE ORAL at 08:11

## 2019-05-18 RX ADMIN — SODIUM CHLORIDE, POTASSIUM CHLORIDE, SODIUM LACTATE AND CALCIUM CHLORIDE: 600; 310; 30; 20 INJECTION, SOLUTION INTRAVENOUS at 17:02

## 2019-05-18 RX ADMIN — PIPERACILLIN SODIUM,TAZOBACTAM SODIUM 3.38 G: 3; .375 INJECTION, POWDER, FOR SOLUTION INTRAVENOUS at 08:02

## 2019-05-18 RX ADMIN — PIPERACILLIN SODIUM,TAZOBACTAM SODIUM 3.38 G: 3; .375 INJECTION, POWDER, FOR SOLUTION INTRAVENOUS at 13:09

## 2019-05-18 RX ADMIN — LEVOTHYROXINE SODIUM 88 MCG: 88 TABLET ORAL at 08:02

## 2019-05-18 RX ADMIN — ONDANSETRON 4 MG: 2 INJECTION INTRAMUSCULAR; INTRAVENOUS at 22:46

## 2019-05-18 RX ADMIN — SENNOSIDES AND DOCUSATE SODIUM 2 TABLET: 8.6; 5 TABLET ORAL at 20:35

## 2019-05-18 RX ADMIN — ENOXAPARIN SODIUM 40 MG: 40 INJECTION SUBCUTANEOUS at 08:10

## 2019-05-18 RX ADMIN — RANITIDINE 150 MG: 150 TABLET ORAL at 20:35

## 2019-05-18 RX ADMIN — RANITIDINE 150 MG: 150 TABLET ORAL at 08:10

## 2019-05-18 RX ADMIN — THIOTHIXENE 5 MG: 5 CAPSULE ORAL at 21:53

## 2019-05-18 RX ADMIN — CITALOPRAM HYDROBROMIDE 20 MG: 20 TABLET ORAL at 20:36

## 2019-05-18 RX ADMIN — QUETIAPINE 50 MG: 25 TABLET ORAL at 21:53

## 2019-05-18 RX ADMIN — OXYBUTYNIN CHLORIDE 15 MG: 5 TABLET, EXTENDED RELEASE ORAL at 08:11

## 2019-05-18 RX ADMIN — ACETAMINOPHEN 975 MG: 325 TABLET, FILM COATED ORAL at 08:02

## 2019-05-18 RX ADMIN — ACETAMINOPHEN 975 MG: 325 TABLET, FILM COATED ORAL at 01:42

## 2019-05-18 RX ADMIN — SIMVASTATIN 20 MG: 5 TABLET, FILM COATED ORAL at 21:53

## 2019-05-18 RX ADMIN — PIPERACILLIN SODIUM,TAZOBACTAM SODIUM 3.38 G: 3; .375 INJECTION, POWDER, FOR SOLUTION INTRAVENOUS at 01:42

## 2019-05-18 RX ADMIN — SENNOSIDES AND DOCUSATE SODIUM 2 TABLET: 8.6; 5 TABLET ORAL at 08:10

## 2019-05-18 RX ADMIN — LAMOTRIGINE 100 MG: 100 TABLET ORAL at 08:11

## 2019-05-18 RX ADMIN — NICOTINE POLACRILEX 2 MG: 2 LOZENGE ORAL at 17:02

## 2019-05-18 RX ADMIN — DIVALPROEX SODIUM 500 MG: 500 TABLET, DELAYED RELEASE ORAL at 21:54

## 2019-05-18 ASSESSMENT — ACTIVITIES OF DAILY LIVING (ADL)
ADLS_ACUITY_SCORE: 15

## 2019-05-18 NOTE — PROGRESS NOTES
SW: Consult acknowledged. Pt expressed desire for home with assist from family at discharge (per therapy note). SW will await therapy rec today/tomorrow, as surgery saying home in 1-2 days. If TCU needed, will obtain choices and begin referral process however appears pt preference is home.

## 2019-05-18 NOTE — PROGRESS NOTES
"General Surgery Note    Stable S/P Ex Lap with extensive NI and Ileostomy Takedown  POD3    -Continue liquids.  Await ROBF  -Pain well controlled  -Increase activity  -Continue Zosyn.  Transition to Augmentin once tolerating PO better.  -Dispo: home in 1-2 day pending bowel function    Feels ok, but no flatus or BM yet.  Minimal nausea, otherwise tolerating liquids.  Reports she is ambulating well.    NAD, pleasant, deep sleeper but wakes to voice and gentle touch  /76 (BP Location: Left arm)   Pulse 93   Temp 98.5  F (36.9  C) (Oral)   Resp 18   Ht 1.626 m (5' 4\")   Wt 78.2 kg (172 lb 6.4 oz)   SpO2 95%   BMI 29.59 kg/m      Intake/Output Summary (Last 24 hours) at 5/18/2019 1012  Last data filed at 5/17/2019 2107  Gross per 24 hour   Intake --   Output 1150 ml   Net -1150 ml     Abd: soft, NT, ND  Inc: CDI with hints of pink and bruising around lower incision and between two incisions.  Cont to monitor  "

## 2019-05-18 NOTE — PLAN OF CARE
PT:  Attempted to see patient for PT this PM. Patient declining OOB mobility, states she doesn't feel well and has been up moving around earlier today. Not agreeable despite education.

## 2019-05-18 NOTE — PLAN OF CARE
VSS. A/O x 4, forgetful @ times, hoarse/slow speech, can ramble at times, mild tremors in hands bilaterally. Lungs: DIANA: wheezes inspiratory, LLL, RUL, RML, RLL: clear/dim throughout, 2 L O2 NC. Cardiac: Irregular HB, murmur detected--pt aware. BS present, no flatus per pt, nm BM. Vega in place, 700  ml total output. Diet: Clears. Up assist of one, walker/GB. Denies pain. Incision: Staples, ecchymotic.

## 2019-05-18 NOTE — PLAN OF CARE
Dx:Ileostomy closure, lysis of adhesions, incisional hernia repair Hx:Schizophrenia, left lobe lobectomy VS:Stable on 3L NC. Pt afebrile despite fevers at night. No A/O:x4. Incisions:Midline incisions with staples MEGHNA. Slight redness around incision. CWMS:WDL. Pain level/controlled with:Denies. Up with:Ax1 walker and GB. Tolerating clear liquid diet. No N/V. No gas. BM:No. Voiding:Due to void. Scanned twice for 89 and 91. GI:activex4. Resp:diminished. Plan: Will continue monitor. MD notified about temps over night and increase redness at incision as well as urine retention. Said to continue to monitor patient.

## 2019-05-18 NOTE — PLAN OF CARE
A/o, forgetful at times, rambles at times. Tachycardic overnight w/ low grade fever, tylenol and ice packs for intervention. 3L O2. Incision approximated w/staples, some erythema showing. Tolerating clears. A1 w/gb and walker. Continue to monitor.

## 2019-05-19 PROCEDURE — 25000132 ZZH RX MED GY IP 250 OP 250 PS 637: Performed by: PHYSICIAN ASSISTANT

## 2019-05-19 PROCEDURE — A9270 NON-COVERED ITEM OR SERVICE: HCPCS | Performed by: PHYSICIAN ASSISTANT

## 2019-05-19 PROCEDURE — 12000000 ZZH R&B MED SURG/OB

## 2019-05-19 PROCEDURE — 25000128 H RX IP 250 OP 636: Performed by: PHYSICIAN ASSISTANT

## 2019-05-19 PROCEDURE — 25800030 ZZH RX IP 258 OP 636: Performed by: PHYSICIAN ASSISTANT

## 2019-05-19 RX ADMIN — CITALOPRAM HYDROBROMIDE 20 MG: 20 TABLET ORAL at 20:27

## 2019-05-19 RX ADMIN — SIMVASTATIN 20 MG: 5 TABLET, FILM COATED ORAL at 22:05

## 2019-05-19 RX ADMIN — ENOXAPARIN SODIUM 40 MG: 40 INJECTION SUBCUTANEOUS at 08:32

## 2019-05-19 RX ADMIN — SODIUM CHLORIDE, POTASSIUM CHLORIDE, SODIUM LACTATE AND CALCIUM CHLORIDE: 600; 310; 30; 20 INJECTION, SOLUTION INTRAVENOUS at 17:09

## 2019-05-19 RX ADMIN — PIPERACILLIN SODIUM,TAZOBACTAM SODIUM 3.38 G: 3; .375 INJECTION, POWDER, FOR SOLUTION INTRAVENOUS at 20:37

## 2019-05-19 RX ADMIN — SENNOSIDES AND DOCUSATE SODIUM 1 TABLET: 8.6; 5 TABLET ORAL at 20:27

## 2019-05-19 RX ADMIN — SENNOSIDES AND DOCUSATE SODIUM 1 TABLET: 8.6; 5 TABLET ORAL at 08:33

## 2019-05-19 RX ADMIN — THIOTHIXENE 5 MG: 5 CAPSULE ORAL at 22:05

## 2019-05-19 RX ADMIN — RANITIDINE 150 MG: 150 TABLET ORAL at 20:27

## 2019-05-19 RX ADMIN — SODIUM CHLORIDE, POTASSIUM CHLORIDE, SODIUM LACTATE AND CALCIUM CHLORIDE: 600; 310; 30; 20 INJECTION, SOLUTION INTRAVENOUS at 01:18

## 2019-05-19 RX ADMIN — OXYBUTYNIN CHLORIDE 15 MG: 5 TABLET, EXTENDED RELEASE ORAL at 08:33

## 2019-05-19 RX ADMIN — GABAPENTIN 300 MG: 300 CAPSULE ORAL at 20:27

## 2019-05-19 RX ADMIN — DIVALPROEX SODIUM 500 MG: 500 TABLET, DELAYED RELEASE ORAL at 22:05

## 2019-05-19 RX ADMIN — GABAPENTIN 300 MG: 300 CAPSULE ORAL at 08:32

## 2019-05-19 RX ADMIN — LAMOTRIGINE 100 MG: 100 TABLET ORAL at 08:33

## 2019-05-19 RX ADMIN — LEVOTHYROXINE SODIUM 88 MCG: 88 TABLET ORAL at 08:32

## 2019-05-19 RX ADMIN — PIPERACILLIN SODIUM,TAZOBACTAM SODIUM 3.38 G: 3; .375 INJECTION, POWDER, FOR SOLUTION INTRAVENOUS at 13:54

## 2019-05-19 RX ADMIN — PIPERACILLIN SODIUM,TAZOBACTAM SODIUM 3.38 G: 3; .375 INJECTION, POWDER, FOR SOLUTION INTRAVENOUS at 08:31

## 2019-05-19 RX ADMIN — PIPERACILLIN SODIUM,TAZOBACTAM SODIUM 3.38 G: 3; .375 INJECTION, POWDER, FOR SOLUTION INTRAVENOUS at 01:37

## 2019-05-19 RX ADMIN — RANITIDINE 150 MG: 150 TABLET ORAL at 08:33

## 2019-05-19 RX ADMIN — QUETIAPINE 50 MG: 25 TABLET ORAL at 22:05

## 2019-05-19 ASSESSMENT — ACTIVITIES OF DAILY LIVING (ADL)
ADLS_ACUITY_SCORE: 15

## 2019-05-19 ASSESSMENT — MIFFLIN-ST. JEOR: SCORE: 1335.64

## 2019-05-19 NOTE — PLAN OF CARE
Alert and oriented x4, responds slowly but able to express needs/desires. Afebrile.  VSS.  Inspiratory wheezes at times.      Oxygen saturation dropped to 62% at rest when patient removed her oxygen cannula.  Currently 95% on 2L.  Cough/deep breathe IS encouraged, only able to get to 400.  Aerobika done too.       Bowel sounds very faint. No flatus. NG w/ dark green bile output (400 ml over 4 hours).  Irrigated frequently, flushes subtracted from output.   Incisions w/staples, open to air.    Ambulates w/assist of 2 due to IV pole + oxygen requirement.

## 2019-05-19 NOTE — PROGRESS NOTES
"General Surgery Note    Stable S/P Ileostomy Takedown  POD4    -No nausea presently, but not passing flatus yet either.  One emesis yesterday, and slightly more distended today.  PRN NGT order.  Antiemetics PRN.  Drop back to NPO and ice chips only if more abdominal pain or N/V.  -Recheck CBC in am  -Wound cares.  If worse in am, could remove couple staples and probe incision.  Continue Zosyn.  -Will check with Dr. Sutton if he would recommend gastroview challenge.  -Increase activity.  See that she declined PT.  Keep pushing this.    Feels pain about the same as yesterday, but little more bloating.  No flatus or BM yet.    NAD, pleasant, A&O  Up in chair  /78   Pulse 75   Temp 97.7  F (36.5  C) (Oral)   Resp 16   Ht 1.626 m (5' 4\")   Wt 78.1 kg (172 lb 1.6 oz)   SpO2 93%   BMI 29.54 kg/m      Intake/Output Summary (Last 24 hours) at 5/19/2019 0909  Last data filed at 5/19/2019 0523  Gross per 24 hour   Intake 300 ml   Output 900 ml   Net -600 ml     Abd: more distended today but also up in chair taking sips of clears.  Inc: has some greenish discharge today, some erythema    Yesterday labs:  WBC: 11.4  Hgb: 10.8  "

## 2019-05-19 NOTE — PLAN OF CARE
A/o, rambles at times. VSS on 2L O2. Denies pain. Voiding adequately, urine incontinence x1. A1 gb/walker. Tolerating clears. Incision MEGHNA, staples, erythema around site, shiny soft/nontender skin beneath transverse incision. BS active, -flatus, -BM's. Continue to monitor, awaiting further bowel fx.

## 2019-05-19 NOTE — PLAN OF CARE
AVSS on 2L NC. A&Ox4, forgetful. Incisions midline with staples MEGHNA, some erythema. Denies pain. Assist x1 walker and GB. Clear liquid diet. Emesis x2, zofran given for nausea. Voiding. BS hypoactive, no flatus. LS diminished. Ambulated halls.

## 2019-05-19 NOTE — PLAN OF CARE
A&Ox4, VSS on 2L O2. Attempted to wean, destated to 69 while up in chair. LS diminished with inspiratory wheezes. BM- Flatus-. Voiding adequately.  NG placed @1145 w/ 600 out due to increased distention. Incision open to air with scant drainage, closed with staples. NPO w/ ice chips, up w/ I walker/GB. Denies pain.

## 2019-05-20 ENCOUNTER — APPOINTMENT (OUTPATIENT)
Dept: PHYSICAL THERAPY | Facility: CLINIC | Age: 59
DRG: 330 | End: 2019-05-20
Attending: SURGERY
Payer: MEDICARE

## 2019-05-20 LAB
ERYTHROCYTE [DISTWIDTH] IN BLOOD BY AUTOMATED COUNT: 13.7 % (ref 10–15)
HCT VFR BLD AUTO: 34.2 % (ref 35–47)
HGB BLD-MCNC: 11.1 G/DL (ref 11.7–15.7)
MCH RBC QN AUTO: 32.8 PG (ref 26.5–33)
MCHC RBC AUTO-ENTMCNC: 32.5 G/DL (ref 31.5–36.5)
MCV RBC AUTO: 101 FL (ref 78–100)
PLATELET # BLD AUTO: 309 10E9/L (ref 150–450)
RBC # BLD AUTO: 3.38 10E12/L (ref 3.8–5.2)
WBC # BLD AUTO: 8.8 10E9/L (ref 4–11)

## 2019-05-20 PROCEDURE — 12000000 ZZH R&B MED SURG/OB

## 2019-05-20 PROCEDURE — 25800030 ZZH RX IP 258 OP 636: Performed by: PHYSICIAN ASSISTANT

## 2019-05-20 PROCEDURE — 36415 COLL VENOUS BLD VENIPUNCTURE: CPT | Performed by: PHYSICIAN ASSISTANT

## 2019-05-20 PROCEDURE — 97530 THERAPEUTIC ACTIVITIES: CPT | Mod: GP | Performed by: PHYSICAL THERAPY ASSISTANT

## 2019-05-20 PROCEDURE — 25000132 ZZH RX MED GY IP 250 OP 250 PS 637: Performed by: PHYSICIAN ASSISTANT

## 2019-05-20 PROCEDURE — 25000128 H RX IP 250 OP 636: Performed by: PHYSICIAN ASSISTANT

## 2019-05-20 PROCEDURE — A9270 NON-COVERED ITEM OR SERVICE: HCPCS | Performed by: PHYSICIAN ASSISTANT

## 2019-05-20 PROCEDURE — 85027 COMPLETE CBC AUTOMATED: CPT | Performed by: PHYSICIAN ASSISTANT

## 2019-05-20 PROCEDURE — 97116 GAIT TRAINING THERAPY: CPT | Mod: GP | Performed by: PHYSICAL THERAPY ASSISTANT

## 2019-05-20 RX ADMIN — RANITIDINE 150 MG: 150 TABLET ORAL at 20:24

## 2019-05-20 RX ADMIN — GABAPENTIN 300 MG: 300 CAPSULE ORAL at 20:23

## 2019-05-20 RX ADMIN — SENNOSIDES AND DOCUSATE SODIUM 2 TABLET: 8.6; 5 TABLET ORAL at 20:24

## 2019-05-20 RX ADMIN — SODIUM CHLORIDE, POTASSIUM CHLORIDE, SODIUM LACTATE AND CALCIUM CHLORIDE: 600; 310; 30; 20 INJECTION, SOLUTION INTRAVENOUS at 17:56

## 2019-05-20 RX ADMIN — PIPERACILLIN SODIUM,TAZOBACTAM SODIUM 3.38 G: 3; .375 INJECTION, POWDER, FOR SOLUTION INTRAVENOUS at 20:24

## 2019-05-20 RX ADMIN — RANITIDINE 150 MG: 150 TABLET ORAL at 08:47

## 2019-05-20 RX ADMIN — ENOXAPARIN SODIUM 40 MG: 40 INJECTION SUBCUTANEOUS at 08:46

## 2019-05-20 RX ADMIN — CITALOPRAM HYDROBROMIDE 20 MG: 20 TABLET ORAL at 20:24

## 2019-05-20 RX ADMIN — LAMOTRIGINE 100 MG: 100 TABLET ORAL at 08:47

## 2019-05-20 RX ADMIN — SODIUM CHLORIDE, POTASSIUM CHLORIDE, SODIUM LACTATE AND CALCIUM CHLORIDE: 600; 310; 30; 20 INJECTION, SOLUTION INTRAVENOUS at 01:59

## 2019-05-20 RX ADMIN — PIPERACILLIN SODIUM,TAZOBACTAM SODIUM 3.38 G: 3; .375 INJECTION, POWDER, FOR SOLUTION INTRAVENOUS at 08:47

## 2019-05-20 RX ADMIN — OXYBUTYNIN CHLORIDE 15 MG: 5 TABLET, EXTENDED RELEASE ORAL at 08:46

## 2019-05-20 RX ADMIN — DIVALPROEX SODIUM 500 MG: 500 TABLET, DELAYED RELEASE ORAL at 22:17

## 2019-05-20 RX ADMIN — SENNOSIDES AND DOCUSATE SODIUM 2 TABLET: 8.6; 5 TABLET ORAL at 08:47

## 2019-05-20 RX ADMIN — LEVOTHYROXINE SODIUM 88 MCG: 88 TABLET ORAL at 08:46

## 2019-05-20 RX ADMIN — QUETIAPINE 50 MG: 25 TABLET ORAL at 22:17

## 2019-05-20 RX ADMIN — PIPERACILLIN SODIUM,TAZOBACTAM SODIUM 3.38 G: 3; .375 INJECTION, POWDER, FOR SOLUTION INTRAVENOUS at 01:59

## 2019-05-20 RX ADMIN — PIPERACILLIN SODIUM,TAZOBACTAM SODIUM 3.38 G: 3; .375 INJECTION, POWDER, FOR SOLUTION INTRAVENOUS at 14:26

## 2019-05-20 RX ADMIN — NICOTINE POLACRILEX 2 MG: 2 GUM, CHEWING ORAL at 09:34

## 2019-05-20 RX ADMIN — GABAPENTIN 300 MG: 300 CAPSULE ORAL at 08:47

## 2019-05-20 RX ADMIN — SIMVASTATIN 20 MG: 5 TABLET, FILM COATED ORAL at 22:17

## 2019-05-20 ASSESSMENT — ACTIVITIES OF DAILY LIVING (ADL)
ADLS_ACUITY_SCORE: 15
ADLS_ACUITY_SCORE: 14
ADLS_ACUITY_SCORE: 14

## 2019-05-20 NOTE — CONSULTS
Care Transition Initial Assessment -      Met with: Patient and Family    Active Problems:    S/P closure of ileostomy       DATA  Lives With: alone   Living Arrangements: apartment     Description of Support System: Supportive, Involved  Who is your support system?: Sibling(s)  Support Assessment: Adequate family and caregiver support  Identified issues/concerns regarding health management:     Per care transitions consult for tcu placement on discharge. Patient was admitted on 5/15/2019 for an illeostomy repair.  Patient is currently medically active with no anticipated date of discharge provided.  Social work met with patient and patient's sister and reviewed patient's chart.  Per therapy note, the therapy team is recommending a TCU placement at discharge to achieve prior level of functioning and independence. Therapy note stated that patient is requiring assistance with mobility and would need 24 hour assist if patient chooses to return home. Patient reported to social work that she only plans to discharge home and is not interested in discharging to a TCU.  Social work discussed the role of social work and that social work is available to assist patient and the patients family in coordinating a safe, discharge plan.    ASSESSMENT  Cognitive Status:  awake and alert  Concerns to be addressed: Discharge planning.     PLAN  Financial costs for the patient includes: TBD.  Patient given options and choices for discharge: TBD .  Patient/family is agreeable to the plan?  TBD  Patient Goals and Preferences: Patient prefers to discharge home with outpatient services and family support.  Patient anticipates discharging to: home vs. TCU.    Social work will continue to monitor and follow for a safe, discharge plan.

## 2019-05-20 NOTE — PROGRESS NOTES
"General Surgery    Stable S/P Ileostomy takedown    -Keep NGT.  Reports + flatus.  But no appetite.  Clamp trial overnight again.  -Pain well controlled  -Increase activity as able.  Shower.    Feels ok.  Better than yesterday, but still no appetite and no BM.    NAD, pleasant, A&O  /78   Pulse 77   Temp 98.5  F (36.9  C) (Oral)   Resp 16   Ht 1.626 m (5' 4\")   Wt 78.1 kg (172 lb 1.6 oz)   SpO2 94%   BMI 29.54 kg/m      Intake/Output Summary (Last 24 hours) at 5/20/2019 1038  Last data filed at 5/20/2019 0800  Gross per 24 hour   Intake 3604 ml   Output 2400 ml   Net 1204 ml     Abd: soft, NT except at incisions, ND  Inc: stable  "

## 2019-05-20 NOTE — PLAN OF CARE
Pt is A+O, VSS. Pt has been in bed most of the day - napping on and off. On 2L of O2. Denies pain at this time. NG to LIS, thick green drainage, flushed to maintain flow during shift. Up with one, gait belt with walker. Pt voiding fine without difficulty. Pt has tremors in arms and jaw that she does intentionally as a habit. Pt is prescribed nicotine gum.

## 2019-05-20 NOTE — PLAN OF CARE
A/o. VSS on 2L O2. NGT intact, moderate output over 4hrs, flushed w/50mL water. Denies pain, n/v. Abd distended and shiny. Pt reports feeling less pressure. BS low-pitched and active, -flatus, -BM/s. NPO ex ice/meds. A1 gb/walker. Continue to monitor output.

## 2019-05-20 NOTE — PLAN OF CARE
VSS on 2L NC, A/O x4, slow to respond at times. Lung sounds diminished, BS active, - flatus. Incisions MEGHNA with some erythema surrounding. NG with moderate amount of thick, green output - irrigated several times but subtracted from total output. Patient denied pain overnight. Voiding adequately. Up with assist of 1 + GB/walker. NPO except ice chips.

## 2019-05-20 NOTE — PLAN OF CARE
Discharge Planner PT   Patient plan for discharge: Did not discuss  Current status: Pt performed supine to sit transfer with min assist.  Sit to/from stand transfers with CGA.  Gait training x 200 ft using wheeled walker and CGA.  Slow pace. Good stability using wheeled walker.  Barriers to return to prior living situation: Lives alone, decreased strength and tolerance to ambulation, assistance needed with mobility.   Recommendations for discharge: TCU per plan established by the PT.   Rationale for recommendations: Pt will benefit from continued skilled PT at a TCU to achieve PLOF and functional independence.  Pt still requiring assist with mobility and would need 24 hour assist if pt chooses to discharge home.            Entered by: Nicolette Haywood 05/20/2019 12:20 PM

## 2019-05-21 ENCOUNTER — APPOINTMENT (OUTPATIENT)
Dept: PHYSICAL THERAPY | Facility: CLINIC | Age: 59
DRG: 330 | End: 2019-05-21
Attending: SURGERY
Payer: MEDICARE

## 2019-05-21 LAB
CREAT SERPL-MCNC: 0.5 MG/DL (ref 0.52–1.04)
GFR SERPL CREATININE-BSD FRML MDRD: >90 ML/MIN/{1.73_M2}
PLATELET # BLD AUTO: 290 10E9/L (ref 150–450)

## 2019-05-21 PROCEDURE — A9270 NON-COVERED ITEM OR SERVICE: HCPCS | Performed by: SURGERY

## 2019-05-21 PROCEDURE — 36415 COLL VENOUS BLD VENIPUNCTURE: CPT | Performed by: PHYSICIAN ASSISTANT

## 2019-05-21 PROCEDURE — 25000128 H RX IP 250 OP 636: Performed by: PHYSICIAN ASSISTANT

## 2019-05-21 PROCEDURE — 12000000 ZZH R&B MED SURG/OB

## 2019-05-21 PROCEDURE — 25000132 ZZH RX MED GY IP 250 OP 250 PS 637: Performed by: PHYSICIAN ASSISTANT

## 2019-05-21 PROCEDURE — 97116 GAIT TRAINING THERAPY: CPT | Mod: GP | Performed by: PHYSICAL THERAPY ASSISTANT

## 2019-05-21 PROCEDURE — 25000132 ZZH RX MED GY IP 250 OP 250 PS 637: Performed by: SURGERY

## 2019-05-21 PROCEDURE — A9270 NON-COVERED ITEM OR SERVICE: HCPCS | Performed by: PHYSICIAN ASSISTANT

## 2019-05-21 PROCEDURE — 82565 ASSAY OF CREATININE: CPT | Performed by: PHYSICIAN ASSISTANT

## 2019-05-21 PROCEDURE — 25800030 ZZH RX IP 258 OP 636: Performed by: PHYSICIAN ASSISTANT

## 2019-05-21 PROCEDURE — 97530 THERAPEUTIC ACTIVITIES: CPT | Mod: GP | Performed by: PHYSICAL THERAPY ASSISTANT

## 2019-05-21 PROCEDURE — 85049 AUTOMATED PLATELET COUNT: CPT | Performed by: PHYSICIAN ASSISTANT

## 2019-05-21 RX ADMIN — Medication 1 LOZENGE: at 22:23

## 2019-05-21 RX ADMIN — THIOTHIXENE 5 MG: 2 CAPSULE ORAL at 22:16

## 2019-05-21 RX ADMIN — SENNOSIDES AND DOCUSATE SODIUM 2 TABLET: 8.6; 5 TABLET ORAL at 08:12

## 2019-05-21 RX ADMIN — OXYBUTYNIN CHLORIDE 15 MG: 5 TABLET, EXTENDED RELEASE ORAL at 08:11

## 2019-05-21 RX ADMIN — PIPERACILLIN SODIUM,TAZOBACTAM SODIUM 3.38 G: 3; .375 INJECTION, POWDER, FOR SOLUTION INTRAVENOUS at 01:39

## 2019-05-21 RX ADMIN — LAMOTRIGINE 100 MG: 100 TABLET ORAL at 08:12

## 2019-05-21 RX ADMIN — ACETAMINOPHEN 650 MG: 325 TABLET, FILM COATED ORAL at 08:49

## 2019-05-21 RX ADMIN — CITALOPRAM HYDROBROMIDE 20 MG: 20 TABLET ORAL at 20:35

## 2019-05-21 RX ADMIN — PIPERACILLIN SODIUM,TAZOBACTAM SODIUM 3.38 G: 3; .375 INJECTION, POWDER, FOR SOLUTION INTRAVENOUS at 20:35

## 2019-05-21 RX ADMIN — PIPERACILLIN SODIUM,TAZOBACTAM SODIUM 3.38 G: 3; .375 INJECTION, POWDER, FOR SOLUTION INTRAVENOUS at 13:52

## 2019-05-21 RX ADMIN — THIOTHIXENE 5 MG: 2 CAPSULE ORAL at 01:34

## 2019-05-21 RX ADMIN — SENNOSIDES AND DOCUSATE SODIUM 2 TABLET: 8.6; 5 TABLET ORAL at 20:35

## 2019-05-21 RX ADMIN — SODIUM CHLORIDE, POTASSIUM CHLORIDE, SODIUM LACTATE AND CALCIUM CHLORIDE: 600; 310; 30; 20 INJECTION, SOLUTION INTRAVENOUS at 01:48

## 2019-05-21 RX ADMIN — ENOXAPARIN SODIUM 40 MG: 40 INJECTION SUBCUTANEOUS at 08:11

## 2019-05-21 RX ADMIN — GABAPENTIN 300 MG: 300 CAPSULE ORAL at 20:35

## 2019-05-21 RX ADMIN — GABAPENTIN 300 MG: 300 CAPSULE ORAL at 08:12

## 2019-05-21 RX ADMIN — RANITIDINE 150 MG: 150 TABLET ORAL at 08:12

## 2019-05-21 RX ADMIN — DIVALPROEX SODIUM 500 MG: 500 TABLET, DELAYED RELEASE ORAL at 22:17

## 2019-05-21 RX ADMIN — Medication 1 LOZENGE: at 20:35

## 2019-05-21 RX ADMIN — SODIUM CHLORIDE, POTASSIUM CHLORIDE, SODIUM LACTATE AND CALCIUM CHLORIDE: 600; 310; 30; 20 INJECTION, SOLUTION INTRAVENOUS at 20:48

## 2019-05-21 RX ADMIN — LEVOTHYROXINE SODIUM 88 MCG: 88 TABLET ORAL at 06:34

## 2019-05-21 RX ADMIN — RANITIDINE 150 MG: 150 TABLET ORAL at 20:35

## 2019-05-21 RX ADMIN — QUETIAPINE 50 MG: 25 TABLET ORAL at 22:17

## 2019-05-21 RX ADMIN — PIPERACILLIN SODIUM,TAZOBACTAM SODIUM 3.38 G: 3; .375 INJECTION, POWDER, FOR SOLUTION INTRAVENOUS at 08:11

## 2019-05-21 RX ADMIN — SIMVASTATIN 20 MG: 5 TABLET, FILM COATED ORAL at 22:17

## 2019-05-21 ASSESSMENT — ACTIVITIES OF DAILY LIVING (ADL)
ADLS_ACUITY_SCORE: 15

## 2019-05-21 NOTE — PLAN OF CARE
Pt is A&Ox4, VSS ex on O2 @ 2 LPM via NC, denies nausea, denies pain. NG clamped @0000, land brad unchanged. Midline ABD incision closed with staples, MEGHNA, is CDI, mild erythema around site. LS fine crackles in bilateral bases, BS+ hypoactive, flatus+, voiding adequately. Pt is up with assist x1, is NPO ex ice chips and meds.

## 2019-05-21 NOTE — PROGRESS NOTES
SPIRITUAL HEALTH SERVICES Progress Note  [facility]    Follow up with pt Santa.      Pt states that she feels much better today. Pt shared that being in hospital has been difficult, and  affirmed it and provided a reflective conversation.     Pt is [employer] and affiliated [facility] in [place]. Pt states that her beto is very important to her.     Pt has two sisters in [facility] and a brother who lives in [place]. Pt said that she doesn't have a  and child, and her two sisters are very supportive.     Pt said that she is passing gas and is waiting for a bowl movement. Pt states that she is hopeful and believes that her surgery went well. Pt wants to go back home soon.     Pt requested prayer, and  provided it.     I and SH remain available if additional needs arise.       Bernice Mena  Chaplain Resident

## 2019-05-21 NOTE — PROGRESS NOTES
"General Surgery    Stable S/P Ileostomy Takedown  POD6  -Place NGT back to suction now to check a residual.  Then, can clamp again.  Return to LIS if any N/V.  Plan for removal tomorrow if passing flatus.    -Continue NPO except sips and chips, meds ok.  -Increase activity.  Encouraged pt out of bed more and more.  -Dispo: Home or TCU in ~2-3 days.  TCU more likely.  See if her activity level is better with starting nutrition tomorrow.    Doing ok, still passing a little flatus, but not as much as yesterday.  Starting to get a little hungry finally, but patient nervous the NGT would have to be reinserted if removed too early again.  She would prefer to keep one more day, with plan to remove tomorrow am and start clears if still doing well.  I think this is reasonable.    Reports walking 3 times yesterday.  Up in chair some.  Denies nausea since NGT clamped at midnight.    NAD, pleasant, A&O  BP 99/56 (BP Location: Left arm)   Pulse 74   Temp 98.8  F (37.1  C) (Oral)   Resp 18   Ht 1.626 m (5' 4\")   Wt 78.1 kg (172 lb 1.6 oz)   SpO2 95%   BMI 29.54 kg/m      Intake/Output Summary (Last 24 hours) at 5/21/2019 1144  Last data filed at 5/21/2019 0800  Gross per 24 hour   Intake 3012 ml   Output 1050 ml   Net 1962 ml     Abd: still some distended, +BS, less tender  Inc: CDI with staples.  "

## 2019-05-21 NOTE — PLAN OF CARE
"Pt is A+Ox4, VSS. Pt voiding adequately. Incision staples are mostly well approximated, some staples appear loose. Incision site is red and shiny. Pain controlled with tylenol. Ambulated with walker and gait belt, calls appropriately. Reports passing flatus but bowel sounds are still hypoactive. NG tube is clamped - pt tolerating well. Pt having ice chips and sips of water, well tolerated, denies nausea. Pt reports that tremors are voluntary, pt states the tremors give her \"something to do\" to fight boredom. Nonproductive cough. Poor tolerance of IS - only to 100, shallow breaths. Follow up instruction provided.   "

## 2019-05-21 NOTE — PLAN OF CARE
Discharge Planner PT   Patient plan for discharge: Wants to return home  Current status: Pt performed sit to/from stand transfers with SBA. Gait training x 180 ft using wheeled walker and CGA.  Slow pace.  LE fatigue noted toward end of gait.  Pt needed to use the bathroom following gait.  CGA with walker navigation in the bathroom.  Pt had 1 LOB when turning around in the bathroom that required min assist to stabilize. Pt returned to bed at end of session and required min-mod assist to get LEs into bed.  Barriers to return to prior living situation: Lives alone, decreased strength and tolerance to ambulation, assistance needed with mobility.   Recommendations for discharge: TCU per plan established by the PT.   Rationale for recommendations: Pt will benefit from continued skilled PT at a TCU to achieve PLOF and functional independence.  Pt still requiring assist with mobility and would need 24 hour assist if pt chooses to discharge home.            Entered by: Nicolette Haywood 05/21/2019 11:31 AM

## 2019-05-21 NOTE — PLAN OF CARE
A/Ox4. VSS on 2L oxygen. LS fine crackles in bases, diminished. Up with 1A/GB/walker. NG to low/intermittent suction, thin green output. Will trial clamping at 0000. Voiding. Baseline tremors. NPO ex ice chips, meds. Denies nausea. Passing gas, BS active. No pain. Abdominal incision MEGHNA, staples. Erythema around incision. 2300 Navene coming from other pharmacy, will be filled ASAP per Rx message. Continue to monitor

## 2019-05-21 NOTE — PLAN OF CARE
[id number]: A&O x4. VS stable on O2 @ 2LPM. Up with assist x1 with walker and gait belt. Denied pain. Abdominal incision intact with staples, erythema and shiny skin noted. Abdomen distended. Bowel sounds active, patient reports passing flatus. Lung sounds diminished with crackles at bilateral lower lobes. NG tube to low intermittent suction, plan for clamping trial overnight. Discharge plan pending.

## 2019-05-22 ENCOUNTER — APPOINTMENT (OUTPATIENT)
Dept: PHYSICAL THERAPY | Facility: CLINIC | Age: 59
DRG: 330 | End: 2019-05-22
Attending: SURGERY
Payer: MEDICARE

## 2019-05-22 PROCEDURE — 97116 GAIT TRAINING THERAPY: CPT | Mod: GP | Performed by: PHYSICAL THERAPIST

## 2019-05-22 PROCEDURE — 97530 THERAPEUTIC ACTIVITIES: CPT | Mod: GP | Performed by: PHYSICAL THERAPIST

## 2019-05-22 PROCEDURE — 25000128 H RX IP 250 OP 636: Performed by: PHYSICIAN ASSISTANT

## 2019-05-22 PROCEDURE — 12000000 ZZH R&B MED SURG/OB

## 2019-05-22 PROCEDURE — 25000132 ZZH RX MED GY IP 250 OP 250 PS 637: Performed by: SURGERY

## 2019-05-22 PROCEDURE — A9270 NON-COVERED ITEM OR SERVICE: HCPCS | Performed by: PHYSICIAN ASSISTANT

## 2019-05-22 PROCEDURE — 25000132 ZZH RX MED GY IP 250 OP 250 PS 637: Performed by: PHYSICIAN ASSISTANT

## 2019-05-22 PROCEDURE — A9270 NON-COVERED ITEM OR SERVICE: HCPCS | Performed by: SURGERY

## 2019-05-22 PROCEDURE — 25800030 ZZH RX IP 258 OP 636: Performed by: PHYSICIAN ASSISTANT

## 2019-05-22 RX ORDER — FUROSEMIDE 10 MG/ML
10 INJECTION INTRAMUSCULAR; INTRAVENOUS ONCE
Status: COMPLETED | OUTPATIENT
Start: 2019-05-22 | End: 2019-05-22

## 2019-05-22 RX ADMIN — Medication 1 LOZENGE: at 08:22

## 2019-05-22 RX ADMIN — RANITIDINE 150 MG: 150 TABLET ORAL at 08:22

## 2019-05-22 RX ADMIN — DIVALPROEX SODIUM 500 MG: 500 TABLET, DELAYED RELEASE ORAL at 21:50

## 2019-05-22 RX ADMIN — SENNOSIDES AND DOCUSATE SODIUM 1 TABLET: 8.6; 5 TABLET ORAL at 20:25

## 2019-05-22 RX ADMIN — OXYBUTYNIN CHLORIDE 15 MG: 5 TABLET, EXTENDED RELEASE ORAL at 08:22

## 2019-05-22 RX ADMIN — GABAPENTIN 300 MG: 300 CAPSULE ORAL at 08:22

## 2019-05-22 RX ADMIN — SENNOSIDES AND DOCUSATE SODIUM 2 TABLET: 8.6; 5 TABLET ORAL at 08:22

## 2019-05-22 RX ADMIN — SIMVASTATIN 20 MG: 5 TABLET, FILM COATED ORAL at 21:46

## 2019-05-22 RX ADMIN — FUROSEMIDE 10 MG: 10 INJECTION, SOLUTION INTRAVENOUS at 12:41

## 2019-05-22 RX ADMIN — PIPERACILLIN SODIUM,TAZOBACTAM SODIUM 3.38 G: 3; .375 INJECTION, POWDER, FOR SOLUTION INTRAVENOUS at 10:14

## 2019-05-22 RX ADMIN — OXYCODONE HYDROCHLORIDE 5 MG: 5 TABLET ORAL at 09:21

## 2019-05-22 RX ADMIN — GABAPENTIN 300 MG: 300 CAPSULE ORAL at 20:25

## 2019-05-22 RX ADMIN — Medication 1 LOZENGE: at 21:52

## 2019-05-22 RX ADMIN — OXYCODONE HYDROCHLORIDE 5 MG: 5 TABLET ORAL at 20:25

## 2019-05-22 RX ADMIN — SODIUM CHLORIDE, POTASSIUM CHLORIDE, SODIUM LACTATE AND CALCIUM CHLORIDE: 600; 310; 30; 20 INJECTION, SOLUTION INTRAVENOUS at 06:01

## 2019-05-22 RX ADMIN — Medication 1 LOZENGE: at 02:02

## 2019-05-22 RX ADMIN — ACETAMINOPHEN 650 MG: 325 TABLET, FILM COATED ORAL at 05:42

## 2019-05-22 RX ADMIN — QUETIAPINE 50 MG: 25 TABLET ORAL at 21:46

## 2019-05-22 RX ADMIN — RANITIDINE 150 MG: 150 TABLET ORAL at 20:25

## 2019-05-22 RX ADMIN — SODIUM CHLORIDE, POTASSIUM CHLORIDE, SODIUM LACTATE AND CALCIUM CHLORIDE: 600; 310; 30; 20 INJECTION, SOLUTION INTRAVENOUS at 21:40

## 2019-05-22 RX ADMIN — PIPERACILLIN SODIUM,TAZOBACTAM SODIUM 3.38 G: 3; .375 INJECTION, POWDER, FOR SOLUTION INTRAVENOUS at 02:03

## 2019-05-22 RX ADMIN — CITALOPRAM HYDROBROMIDE 20 MG: 20 TABLET ORAL at 20:25

## 2019-05-22 RX ADMIN — ENOXAPARIN SODIUM 40 MG: 40 INJECTION SUBCUTANEOUS at 08:22

## 2019-05-22 RX ADMIN — LEVOTHYROXINE SODIUM 88 MCG: 88 TABLET ORAL at 05:42

## 2019-05-22 RX ADMIN — ACETAMINOPHEN 650 MG: 325 TABLET, FILM COATED ORAL at 17:23

## 2019-05-22 RX ADMIN — THIOTHIXENE 5 MG: 2 CAPSULE ORAL at 23:48

## 2019-05-22 RX ADMIN — LAMOTRIGINE 100 MG: 100 TABLET ORAL at 08:22

## 2019-05-22 RX ADMIN — Medication 1 LOZENGE: at 06:51

## 2019-05-22 RX ADMIN — ACETAMINOPHEN 650 MG: 325 TABLET, FILM COATED ORAL at 21:46

## 2019-05-22 ASSESSMENT — ACTIVITIES OF DAILY LIVING (ADL)
ADLS_ACUITY_SCORE: 15
ADLS_ACUITY_SCORE: 14

## 2019-05-22 NOTE — PLAN OF CARE
VSS on 2-3LPM O2 via nasal cannula. A/Ox4. Tremors in upper extremities and lips baseline. Incision on abdomen with staples, erythema present. Pain in throat controlled with lozenges. Up with asstx1, ambulated halls x2 on shift.  NPO with ice chips, NG remains; to LIS for 1.5 hours for residual check, then back to suction for remainder of shift, no n/v. BS active, Flatus +. Voiding adequately to bathroom. LS clear. IS encouraged.

## 2019-05-22 NOTE — PROGRESS NOTES
"General Surgery    Stable S/P Ex Lap with Ileostomy Takedown and Adhesiolysis  POD7    -discontinue NGT  -start back to clear liquids.  ADAT to low fiber diet  -drop IVF down to 50mL/hr.  Requiring little more O2 and LE swelling.  Lasix 10mg IV x1 today for fluid overload.  -Encouraged IS use, DB, cough  -Increase activity as able.  PT following.  Recommending TCU, but pt would really like to go home.  Will see if her strength and mobility improve with nutrition to allow for this but doubtful.  -Stop abx.  Was on Invanz full 7 days.  Afebrile, wound looks fine.  -Remove every other staple POD10.  Remaining out POD14    Feels better today.  Had large BM today.  No nausea again with NG clamped overnight.  Appetite returning.    NAD, pleasant, more alert today  /61 (BP Location: Left arm)   Pulse 68   Temp 98.6  F (37  C) (Oral)   Resp 18   Ht 1.626 m (5' 4\")   Wt 78.1 kg (172 lb 1.6 oz)   SpO2 95%   BMI 29.54 kg/m      Intake/Output Summary (Last 24 hours) at 5/22/2019 1049  Last data filed at 5/22/2019 0720  Gross per 24 hour   Intake 826 ml   Output 1100 ml   Net -274 ml     Abd: soft, NT, mildly distended  Inc: staples    "

## 2019-05-22 NOTE — PROGRESS NOTES
"CLINICAL NUTRITION SERVICES  -  ASSESSMENT NOTE      Malnutrition:   % Weight Loss:  None noted  % Intake:  </= 50% for >/= 5 days (severe malnutrition)  Subcutaneous Fat Loss:  None observed  Muscle Loss:  None observed  Fluid Retention:  None noted    Malnutrition Diagnosis: Patient does not meet two of the above criteria necessary for diagnosing malnutrition          REASON FOR ASSESSMENT  Santa Yousif is a 58 year old female seen by Registered Dietitian for LOS      NUTRITION HISTORY  - Information obtained from the pt  - Patient is on a regular diet at home and had been eating per her baseline PTA, no issues.        CURRENT NUTRITION ORDERS  Diet Order:     NGT d/c'd, starting clear liquids    Current Intake/Tolerance:  Pt has been NPO/liquids since admit, this is day 8.  RN states she will advance to full liquids tonight.      NUTRITION FOCUSED PHYSICAL ASSESSMENT (NFPA)  Completed:  Yes Visual assessment only         Observed:    No nutrition-related physical findings observed    Obtained from Chart/Interdisciplinary Team:  None    ANTHROPOMETRICS  Height: 5' 4\"  Weight: 172 lbs 1.6 oz (78.1)  Body mass index is 29.54 kg/m .  Weight Status:  Overweight BMI 25-29.9  IBW: 54.5 kg +/-10%  % IBW: 143%  Weight History:   Wt Readings from Last 10 Encounters:   05/19/19 78.1 kg (172 lb 1.6 oz)   03/27/19 75.8 kg (167 lb)   09/21/18 75.8 kg (167 lb)       LABS  Labs reviewed    MEDICATIONS  Medications reviewed      ASSESSED NUTRITION NEEDS PER APPROVED PRACTICE GUIDELINES:  Dosing Weight 60 kg - adjusted for overweight  Estimated Energy Needs: 2146-2814 kcals (25-30 Kcal/Kg)  Justification: overweight  Estimated Protein Needs: 70-90 grams protein (1.2-1.5 g pro/Kg)  Justification: post-op    MALNUTRITION:  % Weight Loss:  None noted  % Intake:  </= 50% for >/= 5 days (severe malnutrition)  Subcutaneous Fat Loss:  None observed  Muscle Loss:  None observed  Fluid Retention:  None noted    Malnutrition Diagnosis: " Patient does not meet two of the above criteria necessary for diagnosing malnutrition    NUTRITION DIAGNOSIS:  Inadequate oral intake related to altered GI function as evidenced by 8 days NPO/liquids      NUTRITION INTERVENTIONS  Recommendations / Nutrition Prescription  ADAT per MD discretion.      Implementation  Nutrition education: Per Provider order if indicated   .    Nutrition Goals  Pt will advance to solids in 2-3 days.      MONITORING AND EVALUATION:  Progress towards goals will be monitored and evaluated per protocol and Practice Guidelines      Katie Long RD  Pager [phone] (M-F)            [phone] (W/E & Hol)

## 2019-05-22 NOTE — PLAN OF CARE
Discharge Planner PT   Patient plan for discharge: TCU  Current status: Pt needs cues for abdominal precautions for bed mobility. Pt can sit EOB Mikhail but needs modA for LE to return to supine via sidelying. Pt stands Mikhail using walker and ambulates 200ft with wh walker and CGA with assist to manage IV pole and O2. Pt uses 3L O2 at rest and for activity and O2 sats were 90-93% on 3L after gait. Pt pain was increased with mobility but denied any SOB.  Barriers to return to prior living situation: Pt is not fully independent  Recommendations for discharge: TCU  Rationale for recommendations: Pt has decreased tolerance for activity and increased need for assist and continued rehab at TCU will facilitate return to independence.       Entered by: Veronica Chappell 05/22/2019 9:00 AM       Jamaica from home care called. She is treating Mr. Zo Figueroa wounds from surgical procedure 1/30. She reports that his bp was 200/90, but she is not concerned due to patient did not take his meds. She is more concerned Mr. Zo Figueroa has 2 new abscess. Which are warm to touch. Temp 98. She encouraged patient to call office and get appointment. She also has called his PCP to make them aware. Jamaica is going back out on Thursday. Jamaica stated patient is non-compliant. I will call patient as well, left message on phone to call office.

## 2019-05-22 NOTE — PLAN OF CARE
A/Ox4, flat affect. VSS on 3L with humidification. Desats with activity. Encouraging I.S., tolerated poorly. Passing gas, BM this morning, large. Pain control with Tylenol, incisional pain. NG tube clamped at 2300, no nausea overnight. Up with 1A/GB/walker. NPO ex ice chips/meds. LR infusing at 125 mL/hr, new PIV started this shift. Abdominal incisions MEGHNA, staples, erythema. PCDs in place. Baseline tremors.

## 2019-05-22 NOTE — PLAN OF CARE
NG out 1100. Clear liquids well tolerated. Advanced  To fulls for dinner. 2 BMs today. Voiding. BS active. Incision WDL ex slight erythema.     VSS 3L O2. Shallow breaths. JOHNSON. IS and aerobika done. Walking halls. LS clear. 1 dose lasix given. IVF decreased to 50 mL hr. Tylenol and oxycodone for pain. Will continue to monitor.

## 2019-05-23 ENCOUNTER — APPOINTMENT (OUTPATIENT)
Dept: PHYSICAL THERAPY | Facility: CLINIC | Age: 59
DRG: 330 | End: 2019-05-23
Attending: SURGERY
Payer: MEDICARE

## 2019-05-23 PROCEDURE — A9270 NON-COVERED ITEM OR SERVICE: HCPCS | Performed by: SURGERY

## 2019-05-23 PROCEDURE — 25800030 ZZH RX IP 258 OP 636: Performed by: PHYSICIAN ASSISTANT

## 2019-05-23 PROCEDURE — 25000132 ZZH RX MED GY IP 250 OP 250 PS 637: Performed by: SURGERY

## 2019-05-23 PROCEDURE — A9270 NON-COVERED ITEM OR SERVICE: HCPCS | Performed by: PHYSICIAN ASSISTANT

## 2019-05-23 PROCEDURE — 25000128 H RX IP 250 OP 636: Performed by: PHYSICIAN ASSISTANT

## 2019-05-23 PROCEDURE — 25000132 ZZH RX MED GY IP 250 OP 250 PS 637: Performed by: PHYSICIAN ASSISTANT

## 2019-05-23 PROCEDURE — 12000000 ZZH R&B MED SURG/OB

## 2019-05-23 PROCEDURE — 25000128 H RX IP 250 OP 636: Performed by: INTERNAL MEDICINE

## 2019-05-23 PROCEDURE — 97530 THERAPEUTIC ACTIVITIES: CPT | Mod: GP

## 2019-05-23 PROCEDURE — 97116 GAIT TRAINING THERAPY: CPT | Mod: GP

## 2019-05-23 RX ADMIN — SODIUM CHLORIDE, POTASSIUM CHLORIDE, SODIUM LACTATE AND CALCIUM CHLORIDE: 600; 310; 30; 20 INJECTION, SOLUTION INTRAVENOUS at 18:09

## 2019-05-23 RX ADMIN — SODIUM CHLORIDE 1000 ML: 9 INJECTION, SOLUTION INTRAVENOUS at 02:23

## 2019-05-23 RX ADMIN — SODIUM CHLORIDE, POTASSIUM CHLORIDE, SODIUM LACTATE AND CALCIUM CHLORIDE: 600; 310; 30; 20 INJECTION, SOLUTION INTRAVENOUS at 05:13

## 2019-05-23 RX ADMIN — SENNOSIDES AND DOCUSATE SODIUM 1 TABLET: 8.6; 5 TABLET ORAL at 08:16

## 2019-05-23 RX ADMIN — DIVALPROEX SODIUM 500 MG: 500 TABLET, DELAYED RELEASE ORAL at 22:09

## 2019-05-23 RX ADMIN — RANITIDINE 150 MG: 150 TABLET ORAL at 20:38

## 2019-05-23 RX ADMIN — ENOXAPARIN SODIUM 40 MG: 40 INJECTION SUBCUTANEOUS at 08:18

## 2019-05-23 RX ADMIN — CITALOPRAM HYDROBROMIDE 20 MG: 20 TABLET ORAL at 20:38

## 2019-05-23 RX ADMIN — SENNOSIDES AND DOCUSATE SODIUM 1 TABLET: 8.6; 5 TABLET ORAL at 20:38

## 2019-05-23 RX ADMIN — OXYCODONE HYDROCHLORIDE 10 MG: 5 TABLET ORAL at 14:27

## 2019-05-23 RX ADMIN — ACETAMINOPHEN 650 MG: 325 TABLET, FILM COATED ORAL at 11:06

## 2019-05-23 RX ADMIN — GABAPENTIN 300 MG: 300 CAPSULE ORAL at 20:38

## 2019-05-23 RX ADMIN — GABAPENTIN 300 MG: 300 CAPSULE ORAL at 08:16

## 2019-05-23 RX ADMIN — Medication 1 LOZENGE: at 18:47

## 2019-05-23 RX ADMIN — SIMVASTATIN 20 MG: 5 TABLET, FILM COATED ORAL at 22:09

## 2019-05-23 RX ADMIN — QUETIAPINE 50 MG: 25 TABLET ORAL at 22:09

## 2019-05-23 RX ADMIN — RANITIDINE 150 MG: 150 TABLET ORAL at 08:16

## 2019-05-23 RX ADMIN — LEVOTHYROXINE SODIUM 88 MCG: 88 TABLET ORAL at 08:16

## 2019-05-23 RX ADMIN — OXYBUTYNIN CHLORIDE 15 MG: 5 TABLET, EXTENDED RELEASE ORAL at 08:16

## 2019-05-23 RX ADMIN — LAMOTRIGINE 100 MG: 100 TABLET ORAL at 08:16

## 2019-05-23 RX ADMIN — THIOTHIXENE 5 MG: 2 CAPSULE ORAL at 22:35

## 2019-05-23 ASSESSMENT — ACTIVITIES OF DAILY LIVING (ADL)
ADLS_ACUITY_SCORE: 14

## 2019-05-23 NOTE — PLAN OF CARE
VSS 2L at rest, 3L w/ambulating. O2 sats drop to low 80s on RA when ambulating. IS and aerobika done w/ encouragement. Highest IS to 750. Generalized edema hands, feet. Incisions staples, MEGHNA no drainage. CMS intact. Pain controlled with PRN tylenol and oxycodone. Up with a1 walker, gaitbelt. Tolerating low fiber diet. Denies N&V. + gas, last bm last night. Voiding adequately. LS clear, diminished. Will continue to monitor.    PT saw today. OT consult in place.

## 2019-05-23 NOTE — CODE/RAPID RESPONSE
Date of service: 5/23/2019    I was notified to see this patient given BP reading 77/47 and lethargic. When I saw the patient, repeat blood pressure reading already 90s/60s. Pt's baseline blood pressure has been low 100s systolic for most of the day. Will challenge with 1L NS before pursuing any additional work up and treatment.     Dylan Michaels  [place] Physician/Janie

## 2019-05-23 NOTE — PROGRESS NOTES
[facility]    General Surgery  Daily Post-Op Note       Assessment and Plan:   Santa Yousif is a 58 year old female S/P Procedure(s):  ILEOSTOMY TAKEDOWN, LYSIS OF ADHESIONS, REPAIR OF INCISIONAL HERNIA,EXPLORATORY LAPAROTOMY, 8 Days Post-Op    Pain: minimal, on Oxycodone prn and Tylenol  Bowel: + gas, +BM yesterday, no nausea  Diet: tolerating fulls, advance as tolerated to Low fiber  IVFs: LR @ 50 mL  Activity: encourage up to chair, ambulation 3-4x daily, PT/OT  RRT for hypotension: improved after fluid bolus, monitor BP  DVT prophylaxis: Lovenox, ambulation, PCD   Dispo: TCU in next 1-2 days        Interval History:   Overnight events reviewed.  Pt states she had no symptoms with hypotension.  Reports sometimes has a low BP.  Tolerating fulls with no nausea.  Passing gas. Pain is well controlled.            Physical Exam:   Temp: 98.4  F (36.9  C) Temp src: Oral BP: 120/69 Pulse: 56 Heart Rate: (P) 57 Resp: (P) 18 SpO2: (P) 92 % O2 Device: (P) Nasal cannula Oxygen Delivery: (P) 2 LPM    I/O last 3 completed shifts:  In: 1795 [P.O.:680; I.V.:1115]  Out: 1000 [Urine:1000]      Constitutional: alert and no distress   Abdomen: Abdomen soft, non-tender. Incision: staples in place - clean/dry/intact       Data   Recent Labs   Lab 05/21/19  0708 05/20/19  1108 05/18/19  0617   WBC  --  8.8 11.4*   HGB  --  11.1* 10.8*   MCV  --  101* 104*    309 247   CR 0.50*  --  0.62       Lashae Cadet PA-C

## 2019-05-23 NOTE — PLAN OF CARE
A/o. VSS on 3L humidified O2. Denies pain. BS active, +flatus. Incisions MEGHNA w/staples. A1 gb/walker. Tolerating fulls. RRT overnight d/t BP's in 70's/40's, pressures now stabilized. Continue to monitor.

## 2019-05-23 NOTE — PLAN OF CARE
2508-1130: VSS on 3LPM O2 via nasal cannula. A/Ox4. Incision on abdomen with staples, MEGHNA with erythema. Denies pain on shift. Up with asstx1, ambulated x1 on shift.  Low fiber diet. BS active, Flatus +. Voiding adequately to bathroom. LS clear, dim in bases. IS to 500.

## 2019-05-23 NOTE — CONSULTS
Care Transition Initial Assessment - SW     Met with: PATIENT  Active Problems:    S/P closure of ileostomy       DATA  Lives With: alone   Living Arrangements: apartment  Quality of Family Relationships: supportive, involved  Description of Support System: Supportive, Involved  Who is your support system?: Sibling(s)  Support Assessment: Adequate family and caregiver support.   Identified issues/concerns regarding health management: SW following for discharge needs  Quality of Family Relationships: supportive, involved     ASSESSMENT  Cognitive Status:  Alert and oriented  Concerns to be addressed: patient is a 58 year old female who was admitted to the hospital for S/P closure of ileostomy. Prior to hospitalization, patient was living at home alone where she was managing well. SW discussed therapy recommendation. At beginning of conversation, patient was resistant on TCU. Patient then processed what going home would look like and how she feels she is weaker than normal, has new O2 and is concerned about her blood pressure. Patient is agreeable to TCU and would like a referral sent to [facility] in [place], [place]. SW will send referral and update patient once assessment is complete. Patient will need O2 for transport.     SW faxed info to [facility] [phone]-[phone].   PLAN  Financial costs for the patient includes   Patient given options and choices for discharge to TCU  Patient/family is agreeable to the plan?  YES  Patient Goals and Preferences:[facility] in [place]   Patient anticipates discharging to:  TCU    IRAIDA Lewis   *[id number]

## 2019-05-23 NOTE — PLAN OF CARE
Discharge Planner PT   Patient plan for discharge: TCU  Current status: Pt needs cues for abdominal precautions for bed mobility and technique with rolling with elevated HOB and railing. Cues with seated scooting SBA. Dependent with donning abdominal binder in sitting. SBA with cues for hand positioning and for controlled descent with  sit<>stand with FWW from EOB and toilet. Pt required assist with chito/doffing depends when sitting on the toilet. Amb total 150ft+10ft with 1 standing rest break with close SBA and cues for erect posture. RN present and titrating O2 during ambulation, noted pt desat to 81% on RA. 90's with 3L NC.   Barriers to return to prior living situation: Pt is not fully independent  Recommendations for discharge: TCU  Rationale for recommendations: Pt has decreased tolerance for activity and increased need for assist and continued rehab at TCU will facilitate return to independence. Pt reports not interested in TCU and wants to go home. If pt discharges home, recommend 24/7 assistance with all mobility and home PT/OT/RN and issue FWW. Pt would benefit from IP OT consult as pt currently wants to return home-discussed with RN.            Entered by: Isis Roy 05/23/2019 11:38 AM

## 2019-05-23 NOTE — PLAN OF CARE
A&O. VSS. Expiratory wheezes noted, pt on 3L O2 Nc. Pulmonary hygiene promoted. Bowel sounds +, flatus+, voiding adequately. Incision open to air with staples. Tolerating full liquid diet, but limited appetite. denies nausea. Up assist of 1 with walker/gait belt. Oxycodone and tylenol for pain.

## 2019-05-24 ENCOUNTER — APPOINTMENT (OUTPATIENT)
Dept: PHYSICAL THERAPY | Facility: CLINIC | Age: 59
DRG: 330 | End: 2019-05-24
Attending: SURGERY
Payer: MEDICARE

## 2019-05-24 ENCOUNTER — APPOINTMENT (OUTPATIENT)
Dept: OCCUPATIONAL THERAPY | Facility: CLINIC | Age: 59
DRG: 330 | End: 2019-05-24
Attending: PHYSICIAN ASSISTANT
Payer: MEDICARE

## 2019-05-24 LAB
ALBUMIN SERPL-MCNC: 2 G/DL (ref 3.4–5)
ALP SERPL-CCNC: 61 U/L (ref 40–150)
ALT SERPL W P-5'-P-CCNC: 13 U/L (ref 0–50)
ANION GAP SERPL CALCULATED.3IONS-SCNC: 5 MMOL/L (ref 3–14)
AST SERPL W P-5'-P-CCNC: 17 U/L (ref 0–45)
BILIRUB DIRECT SERPL-MCNC: <0.1 MG/DL (ref 0–0.2)
BILIRUB SERPL-MCNC: 0.2 MG/DL (ref 0.2–1.3)
BUN SERPL-MCNC: <1 MG/DL (ref 7–30)
CALCIUM SERPL-MCNC: 8.3 MG/DL (ref 8.5–10.1)
CHLORIDE SERPL-SCNC: 103 MMOL/L (ref 94–109)
CO2 SERPL-SCNC: 35 MMOL/L (ref 20–32)
CREAT SERPL-MCNC: 0.6 MG/DL (ref 0.52–1.04)
GFR SERPL CREATININE-BSD FRML MDRD: >90 ML/MIN/{1.73_M2}
GLUCOSE SERPL-MCNC: 94 MG/DL (ref 70–99)
MAGNESIUM SERPL-MCNC: 1.8 MG/DL (ref 1.6–2.3)
PLATELET # BLD AUTO: 463 10E9/L (ref 150–450)
POTASSIUM SERPL-SCNC: 2.7 MMOL/L (ref 3.4–5.3)
POTASSIUM SERPL-SCNC: 3.5 MMOL/L (ref 3.4–5.3)
PROT SERPL-MCNC: 5.7 G/DL (ref 6.8–8.8)
SODIUM SERPL-SCNC: 143 MMOL/L (ref 133–144)

## 2019-05-24 PROCEDURE — 82947 ASSAY GLUCOSE BLOOD QUANT: CPT | Performed by: PHYSICIAN ASSISTANT

## 2019-05-24 PROCEDURE — 36415 COLL VENOUS BLD VENIPUNCTURE: CPT | Performed by: PHYSICIAN ASSISTANT

## 2019-05-24 PROCEDURE — 25000132 ZZH RX MED GY IP 250 OP 250 PS 637: Performed by: SURGERY

## 2019-05-24 PROCEDURE — A9270 NON-COVERED ITEM OR SERVICE: HCPCS | Performed by: SURGERY

## 2019-05-24 PROCEDURE — A9270 NON-COVERED ITEM OR SERVICE: HCPCS | Performed by: PHYSICIAN ASSISTANT

## 2019-05-24 PROCEDURE — 97110 THERAPEUTIC EXERCISES: CPT | Mod: GP | Performed by: PHYSICAL THERAPIST

## 2019-05-24 PROCEDURE — 97530 THERAPEUTIC ACTIVITIES: CPT | Mod: GP | Performed by: PHYSICAL THERAPIST

## 2019-05-24 PROCEDURE — 40000275 ZZH STATISTIC RCP TIME EA 10 MIN

## 2019-05-24 PROCEDURE — 80076 HEPATIC FUNCTION PANEL: CPT | Performed by: PHYSICIAN ASSISTANT

## 2019-05-24 PROCEDURE — 80053 COMPREHEN METABOLIC PANEL: CPT | Performed by: PHYSICIAN ASSISTANT

## 2019-05-24 PROCEDURE — 85049 AUTOMATED PLATELET COUNT: CPT | Performed by: PHYSICIAN ASSISTANT

## 2019-05-24 PROCEDURE — 83735 ASSAY OF MAGNESIUM: CPT | Performed by: SURGERY

## 2019-05-24 PROCEDURE — 25000128 H RX IP 250 OP 636: Performed by: PHYSICIAN ASSISTANT

## 2019-05-24 PROCEDURE — 84132 ASSAY OF SERUM POTASSIUM: CPT | Performed by: SURGERY

## 2019-05-24 PROCEDURE — 82310 ASSAY OF CALCIUM: CPT | Performed by: PHYSICIAN ASSISTANT

## 2019-05-24 PROCEDURE — 82565 ASSAY OF CREATININE: CPT | Performed by: PHYSICIAN ASSISTANT

## 2019-05-24 PROCEDURE — 25000125 ZZHC RX 250: Performed by: PHYSICIAN ASSISTANT

## 2019-05-24 PROCEDURE — 84520 ASSAY OF UREA NITROGEN: CPT | Performed by: PHYSICIAN ASSISTANT

## 2019-05-24 PROCEDURE — 25000132 ZZH RX MED GY IP 250 OP 250 PS 637: Performed by: PHYSICIAN ASSISTANT

## 2019-05-24 PROCEDURE — 94640 AIRWAY INHALATION TREATMENT: CPT | Mod: 76

## 2019-05-24 PROCEDURE — 97116 GAIT TRAINING THERAPY: CPT | Mod: GP | Performed by: PHYSICAL THERAPIST

## 2019-05-24 PROCEDURE — 97530 THERAPEUTIC ACTIVITIES: CPT | Mod: GO | Performed by: OCCUPATIONAL THERAPIST

## 2019-05-24 PROCEDURE — 36415 COLL VENOUS BLD VENIPUNCTURE: CPT | Performed by: SURGERY

## 2019-05-24 PROCEDURE — 12000000 ZZH R&B MED SURG/OB

## 2019-05-24 PROCEDURE — 94640 AIRWAY INHALATION TREATMENT: CPT

## 2019-05-24 PROCEDURE — 97535 SELF CARE MNGMENT TRAINING: CPT | Mod: GO | Performed by: OCCUPATIONAL THERAPIST

## 2019-05-24 PROCEDURE — 97166 OT EVAL MOD COMPLEX 45 MIN: CPT | Mod: GO | Performed by: OCCUPATIONAL THERAPIST

## 2019-05-24 PROCEDURE — 80051 ELECTROLYTE PANEL: CPT | Performed by: PHYSICIAN ASSISTANT

## 2019-05-24 RX ORDER — DOCUSATE SODIUM 100 MG/1
100 CAPSULE, LIQUID FILLED ORAL 2 TIMES DAILY
Status: DISCONTINUED | OUTPATIENT
Start: 2019-05-24 | End: 2019-05-24

## 2019-05-24 RX ORDER — AMOXICILLIN 250 MG
1 CAPSULE ORAL 2 TIMES DAILY
Qty: 30 TABLET | Refills: 0 | Status: SHIPPED | OUTPATIENT
Start: 2019-05-24

## 2019-05-24 RX ORDER — IPRATROPIUM BROMIDE AND ALBUTEROL SULFATE 2.5; .5 MG/3ML; MG/3ML
3 SOLUTION RESPIRATORY (INHALATION)
Status: DISCONTINUED | OUTPATIENT
Start: 2019-05-24 | End: 2019-05-28 | Stop reason: HOSPADM

## 2019-05-24 RX ORDER — POTASSIUM CHLORIDE 1.5 G/1.58G
20-40 POWDER, FOR SOLUTION ORAL
Status: DISCONTINUED | OUTPATIENT
Start: 2019-05-24 | End: 2019-05-28 | Stop reason: HOSPADM

## 2019-05-24 RX ORDER — POTASSIUM CHLORIDE 1500 MG/1
20-40 TABLET, EXTENDED RELEASE ORAL
Status: DISCONTINUED | OUTPATIENT
Start: 2019-05-24 | End: 2019-05-28 | Stop reason: HOSPADM

## 2019-05-24 RX ORDER — ACETAMINOPHEN 325 MG/1
650 TABLET ORAL EVERY 4 HOURS PRN
Qty: 30 TABLET | Refills: 1 | Status: SHIPPED | OUTPATIENT
Start: 2019-05-24

## 2019-05-24 RX ORDER — POTASSIUM CL/LIDO/0.9 % NACL 10MEQ/0.1L
10 INTRAVENOUS SOLUTION, PIGGYBACK (ML) INTRAVENOUS
Status: DISCONTINUED | OUTPATIENT
Start: 2019-05-24 | End: 2019-05-28 | Stop reason: HOSPADM

## 2019-05-24 RX ORDER — POTASSIUM CHLORIDE 29.8 MG/ML
20 INJECTION INTRAVENOUS
Status: DISCONTINUED | OUTPATIENT
Start: 2019-05-24 | End: 2019-05-28 | Stop reason: HOSPADM

## 2019-05-24 RX ORDER — POTASSIUM CHLORIDE 7.45 MG/ML
10 INJECTION INTRAVENOUS
Status: DISCONTINUED | OUTPATIENT
Start: 2019-05-24 | End: 2019-05-28 | Stop reason: HOSPADM

## 2019-05-24 RX ORDER — OXYCODONE HYDROCHLORIDE 5 MG/1
5 TABLET ORAL
Qty: 15 TABLET | Refills: 0 | Status: SHIPPED | OUTPATIENT
Start: 2019-05-24 | End: 2019-06-06

## 2019-05-24 RX ADMIN — GABAPENTIN 300 MG: 300 CAPSULE ORAL at 21:00

## 2019-05-24 RX ADMIN — SENNOSIDES AND DOCUSATE SODIUM 2 TABLET: 8.6; 5 TABLET ORAL at 21:00

## 2019-05-24 RX ADMIN — POTASSIUM CHLORIDE 40 MEQ: 1500 TABLET, EXTENDED RELEASE ORAL at 10:56

## 2019-05-24 RX ADMIN — DIVALPROEX SODIUM 500 MG: 500 TABLET, DELAYED RELEASE ORAL at 21:00

## 2019-05-24 RX ADMIN — SENNOSIDES AND DOCUSATE SODIUM 1 TABLET: 8.6; 5 TABLET ORAL at 08:58

## 2019-05-24 RX ADMIN — QUETIAPINE 50 MG: 25 TABLET ORAL at 21:01

## 2019-05-24 RX ADMIN — IPRATROPIUM BROMIDE AND ALBUTEROL SULFATE 3 ML: .5; 3 SOLUTION RESPIRATORY (INHALATION) at 20:25

## 2019-05-24 RX ADMIN — OXYBUTYNIN CHLORIDE 15 MG: 5 TABLET, EXTENDED RELEASE ORAL at 08:58

## 2019-05-24 RX ADMIN — LAMOTRIGINE 100 MG: 100 TABLET ORAL at 08:58

## 2019-05-24 RX ADMIN — OXYCODONE HYDROCHLORIDE 5 MG: 5 TABLET ORAL at 06:11

## 2019-05-24 RX ADMIN — GABAPENTIN 300 MG: 300 CAPSULE ORAL at 08:58

## 2019-05-24 RX ADMIN — RANITIDINE 150 MG: 150 TABLET ORAL at 21:00

## 2019-05-24 RX ADMIN — THIOTHIXENE 5 MG: 2 CAPSULE ORAL at 23:20

## 2019-05-24 RX ADMIN — ENOXAPARIN SODIUM 40 MG: 40 INJECTION SUBCUTANEOUS at 10:47

## 2019-05-24 RX ADMIN — CITALOPRAM HYDROBROMIDE 20 MG: 20 TABLET ORAL at 21:00

## 2019-05-24 RX ADMIN — RANITIDINE 150 MG: 150 TABLET ORAL at 08:58

## 2019-05-24 RX ADMIN — POTASSIUM CHLORIDE 40 MEQ: 1500 TABLET, EXTENDED RELEASE ORAL at 13:46

## 2019-05-24 RX ADMIN — LEVOTHYROXINE SODIUM 88 MCG: 88 TABLET ORAL at 06:30

## 2019-05-24 RX ADMIN — SIMVASTATIN 20 MG: 5 TABLET, FILM COATED ORAL at 21:01

## 2019-05-24 RX ADMIN — IPRATROPIUM BROMIDE AND ALBUTEROL SULFATE 3 ML: .5; 3 SOLUTION RESPIRATORY (INHALATION) at 11:59

## 2019-05-24 ASSESSMENT — ACTIVITIES OF DAILY LIVING (ADL)
PREVIOUS_RESPONSIBILITIES: MEAL PREP;HOUSEKEEPING;LAUNDRY;DRIVING
ADLS_ACUITY_SCORE: 14
ADLS_ACUITY_SCORE: 12
ADLS_ACUITY_SCORE: 14
ADLS_ACUITY_SCORE: 14

## 2019-05-24 NOTE — PROGRESS NOTES
05/24/19 1354   Quick Adds   Type of Visit Initial Occupational Therapy Evaluation   Living Environment   Lives With alone   Living Arrangements apartment   Home Accessibility   (elevator)   Transportation Anticipated car, drives self   Living Environment Comment Sisters live nearby, pt reports would be able to help with groceries etc as needed.   Self-Care   Equipment Currently Used at Home   (has ww at home if needed)   Activity/Exercise/Self-Care Comment walk in shower, standard height with sink next to toilet.   Functional Level   Ambulation 0-->independent   Transferring 0-->independent   Toileting 0-->independent   Bathing 0-->independent   Dressing 0-->independent   Eating 0-->independent   Communication 0-->understands/communicates without difficulty   Swallowing 0-->swallows foods/liquids without difficulty   Cognition 0 - no cognition issues reported   Fall history within last six months no   Prior Functional Level Comment Pt I with ADL/IADLs does her own cooking, cleaning and laundry, sister, Cherie helps with med set up and finances. pt is retired.    General Information   Onset of Illness/Injury or Date of Surgery - Date 05/15/19   Referring Physician Lashae Cadet PA-C   Patient/Family Goals Statement home   Additional Occupational Profile Info/Pertinent History of Current Problem s/p ILEOSTOMY TAKEDOWN, LYSIS OF ADHESIONS, REPAIR OF INCISIONAL HERNIA,EXPLORATORY LAPAROTOMY, 9 Days Post-Op   Precautions/Limitations fall precautions;oxygen therapy device and L/min  (O2 3 L)   Cognitive Status Examination   Orientation person;place  (pt reoriented to current month, reports April initially)   Level of Consciousness alert   Follows Commands (Cognition) WFL   Cognitive Comment Continue to monitor safety and cognition.    Visual Perception   Visual Perception Wears glasses   Sensory Examination   Sensory Quick Adds No deficits were identified   Pain Assessment   Patient Currently in Pain No    Range of Motion (ROM)   ROM Comment B UE AROM WFL   Strength   Strength Comments B UE strength NT due to abdominal pain   Bed Mobility Skill: Rolling/Turning   Level of [place] - Bed Mobility Skill Rolling Turning contact guard   Bed Mobility Skill: Scooting/Bridging   Level of [place]: Scooting/Bridging contact guard   Bed Mobility Skill: Supine to Sit   Level of [place]: Supine/Sit minimum assist (75% patients effort)   Assistive Device: Supine/Sit bedrail   Transfer Skill: Bed to Chair/Chair to Bed   Level of [place]: Bed to Chair contact guard   Assistive Device - Transfer Skill Bed to Chair Chair to Bed Rehab Eval rolling walker   Transfer Skill: Sit to Stand   Level of [place]: Sit/Stand contact guard   Assistive Device for Transfer: Sit/Stand rolling walker   Transfer Skill: Toilet Transfer   Level of [place]: Toilet contact guard   Assistive Device rolling walker;grab bars   Upper Body Dressing   Level of [place]: Dress Upper Body moderate assist (50% patients effort)   Lower Body Dressing   Level of [place]: Dress Lower Body maximum assist (25% patients effort)   Toileting   Level of [place]: Toilet contact guard   Grooming   Level of [place]: Grooming contact guard   Eating/Self Feeding   Level of [place]: Eating independent   Instrumental Activities of Daily Living (IADL)   Previous Responsibilities meal prep;housekeeping;laundry;driving   Activities of Daily Living Analysis   Impairments Contributing to Impaired Activities of Daily Living balance impaired;cognition impaired;pain;post surgical precautions;strength decreased  (decreased activity tolerance)   General Therapy Interventions   Planned Therapy Interventions ADL retraining;cognition;transfer training;home program guidelines;progressive activity/exercise   Clinical Impression   Criteria for Skilled Therapeutic Interventions Met yes, treatment indicated   OT Diagnosis decreased ADL's and  "functional mobility   Influenced by the following impairments impaired balance, strength, activity tolernace, abdominal pain, lives alone, decreased safety, executive functioning.    Assessment of Occupational Performance 3-5 Performance Deficits   Identified Performance Deficits impaired I with dressing, toilet and shower transfer, driving, cooking, laundry, cleaning, etc   Clinical Decision Making (Complexity) Moderate complexity   Therapy Frequency daily   Predicted Duration of Therapy Intervention (days/wks) 4 days   Anticipated Discharge Disposition Transitional Care Facility   Risks and Benefits of Treatment have been explained. Yes   Patient, Family & other staff in agreement with plan of care Yes   [facility] AM-PAC  \"6 Clicks\" Daily Activity Inpatient Short Form   1. Putting on and taking off regular lower body clothing? 2 - A Lot   2. Bathing (including washing, rinsing, drying)? 3 - A Little   3. Toileting, which includes using toilet, bedpan or urinal? 3 - A Little   4. Putting on and taking off regular upper body clothing? 3 - A Little   5. Taking care of personal grooming such as brushing teeth? 3 - A Little   6. Eating meals? 4 - None   Daily Activity Raw Score (Score out of 24.Lower scores equate to lower levels of function) 18   Total Evaluation Time   Total Evaluation Time (Minutes) 10     "

## 2019-05-24 NOTE — PLAN OF CARE
Patient is alert and oriented x 4, AVSS, 93% on 3 liters. Desats on RA. Lungs diminished.   Incision with staples intact. Abdominal binder in place. + BS, passing flatus. Voiding on her own.  Complained of mild pain. Treated with oxycodone 5 mg.   Up with 1 assist.

## 2019-05-24 NOTE — PLAN OF CARE
Discharge Planner OT   Patient plan for discharge: appears open to TCU  Current status: Eval completed and treatment initiated. Pt lives in an apt alone and reports I with ADL's and most IADL's, does her own cleaning, laundry, cooking, shopping, drives. Pt reports her sister A's with finance and med mgmt. Pt now s/p S/P ileostomy takedown, lysis of adhesions, repair of incisional hernia, exploratory laparotomy POD 9. Supine to sit with CGA/MIN A and cues for log roll tech, sit <> stand and ambulate with ww to BR and back and toilet transfer with CGA and cues for tech. Pt requires MAX A for LE dress and able to stand at sink with cues for ww placement with CGA for safety to wash hands, SBA for toileting hygiene.   Barriers to return to prior living situation: abdominal pain, decreased balance, activity tolerance, strength, B LE swelling, lives alone.   Recommendations for discharge: TCU  Rationale for recommendations: pt unable to complete ADL's or functional mobility and will require daily therapy to increase ADL and functional mobility independence and safety to PLOF.       Entered by: Nicolette Reyes 05/24/2019 2:39 PM

## 2019-05-24 NOTE — PLAN OF CARE
A/O. L/S with expiratory wheezes. Desats to 70s without O2, on 4L NC to maintain sats. Pulmonary toilet encouraged. Incisions with staples bita. BM & flatus ++. Electrolytes replaced per protocol. Up with 1, ambulated in hallway during shift. Will continue to monitor.

## 2019-05-24 NOTE — PROGRESS NOTES
ADDENDUM 10:42am --- Labs reviewed. Potassium replacement ordered. Co2 slightly elevated, albumin low. Patient not using any nebs/inhalers - will order to start today and likely send with her on discharge. Not ready for discharge today. Monitor blood pressure, fluid balance, O2. Hopefully discharge to TCU over the weekend vs home with assistance pending improvement.    Tara Bernard PA-C  [facility]  [phone]      -------------------------------------------------------------------------    General Surgery Progress Note    Admission Date: 5/15/2019  Today's Date: 5/24/2019         Assessment:      Santa Yousif is a 58 year old female   S/P ileostomy takedown, lysis of adhesions, repair of incisional hernia, exploratory laparotomy POD 9         Plan:   - Wean O2 as able. Aggressive pulmonary toilet with IS and flutter valve. She is slightly edematous and +2.6L since admission, but pressures are soft so do not want to diurese. Hopefully with increased mobility, improved PO intake, and continued pulmonary toilet her edema will resolve. Patient is a smoker, so O2 needs postoperatively are not unexpected  - AM labs pending  - Oxy and tylenol available for pain  - Senna-docusate bid   - Continue low fiber diet x 2 weeks  - Saline lock IV fluids  - Encourage activity, PT/OT consulted  - Lovenox, PCDs  - Return to clinic next week for staple removal and recheck    Dispo: Recommend discharge to TCU when bed available. Patient again being hesitant to TCU. If she refuses TCU, then home with assistance from siblings, home RN/PT/OT. She will need O2 at discharge          Interval History:   Afebrile. Some hypotension yesterday, improved with fluids. Vitals stable through the night. Still needing some O2. Excellent UOP. Continues to pass gas. No BM yesterday. Denies nausea. Tolerating low fiber diet. Pain mild, controlled with meds. Again resistant to going to TCU, thinks she'll be fine at home with help from  "her siblings. I explained rationale for short term rehab stay and urged her to consider. Explained to her that she is needing a lot of assistance, O2, and would benefit from higher level care.          Physical Exam:   /65   Pulse 70   Temp 98.5  F (36.9  C) (Oral)   Resp 16   Ht 1.626 m (5' 4\")   Wt 78.1 kg (172 lb 1.6 oz)   SpO2 92%   BMI 29.54 kg/m    I/O last 3 completed shifts:  In: 2110 [P.O.:1560; I.V.:550]  Out: 2700 [Urine:2700]  General: NAD, awake and alert, oriented x 3, slow to respond at times  Respiratory: non-labored breathing. Good air movement. Coarse breath sounds, no wheezing appreciated  Cardiovascular: regular rate and rhythm; S1 and S2 distinct without murmur   Abdomen: soft, non tender, non distended. Wearing abdominal binder  Incision: clean, dry, and intact with staples in place  Extremities: generalized bilateral edema    LABS:  Recent Labs   Lab Test 05/24/19  0640 05/21/19  0708 05/20/19  1108 05/18/19  0617 05/16/19  0752   WBC  --   --  8.8 11.4* 8.8   HGB  --   --  11.1* 10.8* 13.9   MCV  --   --  101* 104* 100   * 290 309 247 287      Recent Labs   Lab Test 05/24/19  0640 05/21/19  0708 05/18/19  0617 05/16/19  0752  09/21/18  0625   POTASSIUM 2.7*  --   --  4.0  --  4.2   CHLORIDE 103  --   --  105  --  107   CO2 35*  --   --  29  --  28   BUN <1*  --   --  9  --  6*   CR 0.60 0.50* 0.62 0.62   < > 0.71   ANIONGAP 5  --   --  3  --  6    < > = values in this interval not displayed.         --------------------------------    Tara Bernard PA-C  [facility]: [phone]  Pager: [phone]@[facility] (7am-4pm)        "

## 2019-05-25 ENCOUNTER — APPOINTMENT (OUTPATIENT)
Dept: OCCUPATIONAL THERAPY | Facility: CLINIC | Age: 59
DRG: 330 | End: 2019-05-25
Attending: SURGERY
Payer: MEDICARE

## 2019-05-25 LAB
ANION GAP SERPL CALCULATED.3IONS-SCNC: 2 MMOL/L (ref 3–14)
BUN SERPL-MCNC: 1 MG/DL (ref 7–30)
CALCIUM SERPL-MCNC: 8.8 MG/DL (ref 8.5–10.1)
CHLORIDE SERPL-SCNC: 101 MMOL/L (ref 94–109)
CO2 SERPL-SCNC: 38 MMOL/L (ref 20–32)
CREAT SERPL-MCNC: 0.6 MG/DL (ref 0.52–1.04)
ERYTHROCYTE [DISTWIDTH] IN BLOOD BY AUTOMATED COUNT: 14.7 % (ref 10–15)
GFR SERPL CREATININE-BSD FRML MDRD: >90 ML/MIN/{1.73_M2}
GLUCOSE SERPL-MCNC: 97 MG/DL (ref 70–99)
HCT VFR BLD AUTO: 33.3 % (ref 35–47)
HGB BLD-MCNC: 10.4 G/DL (ref 11.7–15.7)
MCH RBC QN AUTO: 32.6 PG (ref 26.5–33)
MCHC RBC AUTO-ENTMCNC: 31.2 G/DL (ref 31.5–36.5)
MCV RBC AUTO: 104 FL (ref 78–100)
PLATELET # BLD AUTO: 534 10E9/L (ref 150–450)
POTASSIUM SERPL-SCNC: 3.4 MMOL/L (ref 3.4–5.3)
POTASSIUM SERPL-SCNC: 3.5 MMOL/L (ref 3.4–5.3)
RBC # BLD AUTO: 3.19 10E12/L (ref 3.8–5.2)
SODIUM SERPL-SCNC: 141 MMOL/L (ref 133–144)
WBC # BLD AUTO: 9 10E9/L (ref 4–11)

## 2019-05-25 PROCEDURE — 25000128 H RX IP 250 OP 636: Performed by: PHYSICIAN ASSISTANT

## 2019-05-25 PROCEDURE — 36415 COLL VENOUS BLD VENIPUNCTURE: CPT | Performed by: SURGERY

## 2019-05-25 PROCEDURE — 12000000 ZZH R&B MED SURG/OB

## 2019-05-25 PROCEDURE — 80048 BASIC METABOLIC PNL TOTAL CA: CPT | Performed by: PHYSICIAN ASSISTANT

## 2019-05-25 PROCEDURE — 25000132 ZZH RX MED GY IP 250 OP 250 PS 637: Performed by: SURGERY

## 2019-05-25 PROCEDURE — 84132 ASSAY OF SERUM POTASSIUM: CPT | Performed by: SURGERY

## 2019-05-25 PROCEDURE — A9270 NON-COVERED ITEM OR SERVICE: HCPCS | Performed by: PHYSICIAN ASSISTANT

## 2019-05-25 PROCEDURE — 85027 COMPLETE CBC AUTOMATED: CPT | Performed by: PHYSICIAN ASSISTANT

## 2019-05-25 PROCEDURE — 97535 SELF CARE MNGMENT TRAINING: CPT | Mod: GO | Performed by: OCCUPATIONAL THERAPIST

## 2019-05-25 PROCEDURE — 25000128 H RX IP 250 OP 636: Performed by: SURGERY

## 2019-05-25 PROCEDURE — 97530 THERAPEUTIC ACTIVITIES: CPT | Mod: GO | Performed by: OCCUPATIONAL THERAPIST

## 2019-05-25 PROCEDURE — 25000132 ZZH RX MED GY IP 250 OP 250 PS 637: Performed by: PHYSICIAN ASSISTANT

## 2019-05-25 PROCEDURE — A9270 NON-COVERED ITEM OR SERVICE: HCPCS | Performed by: SURGERY

## 2019-05-25 PROCEDURE — 25000125 ZZHC RX 250: Performed by: PHYSICIAN ASSISTANT

## 2019-05-25 PROCEDURE — 40000275 ZZH STATISTIC RCP TIME EA 10 MIN

## 2019-05-25 PROCEDURE — 94640 AIRWAY INHALATION TREATMENT: CPT

## 2019-05-25 PROCEDURE — 36415 COLL VENOUS BLD VENIPUNCTURE: CPT | Performed by: PHYSICIAN ASSISTANT

## 2019-05-25 PROCEDURE — 94640 AIRWAY INHALATION TREATMENT: CPT | Mod: 76

## 2019-05-25 RX ORDER — FUROSEMIDE 10 MG/ML
20 INJECTION INTRAMUSCULAR; INTRAVENOUS ONCE
Status: COMPLETED | OUTPATIENT
Start: 2019-05-25 | End: 2019-05-25

## 2019-05-25 RX ADMIN — LEVOTHYROXINE SODIUM 88 MCG: 88 TABLET ORAL at 05:35

## 2019-05-25 RX ADMIN — OXYBUTYNIN CHLORIDE 15 MG: 5 TABLET, EXTENDED RELEASE ORAL at 08:19

## 2019-05-25 RX ADMIN — GABAPENTIN 300 MG: 300 CAPSULE ORAL at 21:00

## 2019-05-25 RX ADMIN — OXYCODONE HYDROCHLORIDE 5 MG: 5 TABLET ORAL at 23:22

## 2019-05-25 RX ADMIN — THIOTHIXENE 5 MG: 2 CAPSULE ORAL at 21:59

## 2019-05-25 RX ADMIN — IPRATROPIUM BROMIDE AND ALBUTEROL SULFATE 3 ML: .5; 3 SOLUTION RESPIRATORY (INHALATION) at 16:03

## 2019-05-25 RX ADMIN — LAMOTRIGINE 100 MG: 100 TABLET ORAL at 08:19

## 2019-05-25 RX ADMIN — RANITIDINE 150 MG: 150 TABLET ORAL at 08:19

## 2019-05-25 RX ADMIN — SIMVASTATIN 20 MG: 5 TABLET, FILM COATED ORAL at 21:00

## 2019-05-25 RX ADMIN — RANITIDINE 150 MG: 150 TABLET ORAL at 21:01

## 2019-05-25 RX ADMIN — DIVALPROEX SODIUM 500 MG: 500 TABLET, DELAYED RELEASE ORAL at 21:00

## 2019-05-25 RX ADMIN — ENOXAPARIN SODIUM 40 MG: 40 INJECTION SUBCUTANEOUS at 08:20

## 2019-05-25 RX ADMIN — QUETIAPINE 50 MG: 25 TABLET ORAL at 21:00

## 2019-05-25 RX ADMIN — SENNOSIDES AND DOCUSATE SODIUM 1 TABLET: 8.6; 5 TABLET ORAL at 21:01

## 2019-05-25 RX ADMIN — NICOTINE POLACRILEX 2 MG: 2 GUM, CHEWING ORAL at 23:22

## 2019-05-25 RX ADMIN — Medication 1 LOZENGE: at 05:35

## 2019-05-25 RX ADMIN — IPRATROPIUM BROMIDE AND ALBUTEROL SULFATE 3 ML: .5; 3 SOLUTION RESPIRATORY (INHALATION) at 09:14

## 2019-05-25 RX ADMIN — GABAPENTIN 300 MG: 300 CAPSULE ORAL at 08:19

## 2019-05-25 RX ADMIN — IPRATROPIUM BROMIDE AND ALBUTEROL SULFATE 3 ML: .5; 3 SOLUTION RESPIRATORY (INHALATION) at 12:09

## 2019-05-25 RX ADMIN — FUROSEMIDE 20 MG: 10 INJECTION, SOLUTION INTRAVENOUS at 11:37

## 2019-05-25 RX ADMIN — ACETAMINOPHEN 650 MG: 325 TABLET, FILM COATED ORAL at 21:00

## 2019-05-25 RX ADMIN — CITALOPRAM HYDROBROMIDE 20 MG: 20 TABLET ORAL at 21:00

## 2019-05-25 RX ADMIN — Medication 2 LOZENGE: at 08:19

## 2019-05-25 ASSESSMENT — ACTIVITIES OF DAILY LIVING (ADL)
ADLS_ACUITY_SCORE: 12

## 2019-05-25 ASSESSMENT — MIFFLIN-ST. JEOR: SCORE: 1468.54

## 2019-05-25 NOTE — PROGRESS NOTES
Pt. On 3L oxymask sat. Low 90's,  expiratory wheezes on  L&RUL. Neb Tx. Given as scheduled. Will continue to follow.                                                                                           Miguel Toth

## 2019-05-25 NOTE — PROGRESS NOTES
ENRICO  D/I: SW received medical team update that patient is not medically stable for discharge.  ENRICO contacted patient's sister Cherie and charge nurse at facility to provide an update on patient's discharge.  Patient anticipated date of discharge is 5/26/19.  P: SW will continue to monitor and follow for a safe, discharge plan.

## 2019-05-25 NOTE — PLAN OF CARE
Discharge Planner PT   Patient plan for discharge: Open to TCU prior to returning to her home.  Current status: PT: Reports 4/10 abdominal pain with activity.  Needs Rona and vc for seated exercises that include: AP, LAQ, knee lifts, and GS, all x 10 reps, followed by gait with CGA and vc with FWW, 80', and 3 LPM via nc.  O2 sats: 96% prior to walk and 92% after.  HR: 62 prior to and after walk.  Barriers to return to prior living situation: Lives alone, and reduced activity tolerance.  Recommendations for discharge: TCU.  Rationale for recommendations: Will continue to need skilled PT for recovery of functional mobility, and safety for negotiation of chosen residence.       Entered by: Kiran Leonard 05/24/2019 10:54 PM

## 2019-05-25 NOTE — PROGRESS NOTES
"General Surgery Progress Note    Subjective: pt overall reports she is improving. Pain minimal. Passing gas and stool. Still on oxygen. PT recommending TCU. Hypokalemic.     Objective: /62 (BP Location: Right arm)   Pulse 63   Temp 98  F (36.7  C) (Oral)   Resp 18   Ht 1.626 m (5' 4\")   Wt 91.4 kg (201 lb 6.4 oz)   SpO2 91%   BMI 34.57 kg/m    Gen: alert, sitting up in chair, no distress  CV: RRR  Pulm: nonlabored breathing  Abd: soft, incision c/d/i w staples in place. No erythema  Ext: WWP    Assessment/Plan:   Santa Yousif  is a 58 year old female s/p ileostomy takedown. Improving. Still hypervolemic, will diurese further. Abdomen looks good. Anticipate ready for discharge to TCU tomorrow.   - lasix  - BMP in am    Anali Godinez MD  [facility]  [phone]              "

## 2019-05-25 NOTE — PLAN OF CARE
0510-2619:  A&Ox4. VSS on 3L NC. Denies pain. Incision, staples, slight erythema. Ab binder in place. BS active, passing gas, BM 5-24. Low fiber diet, poor appetite. Denies nausea. Up SBA to bathroom. Voiding well. Baseline general tremors. BLE +2 edema.     4410-3116:  Slept well overnight, minimal pain. Reports sore back due to bed. Relieved by sitting up in chair. On 2-3L NC, desats to upper 70s without 02.

## 2019-05-25 NOTE — PLAN OF CARE
Discharge Planner OT   Patient plan for discharge: agreeable to TCU  Current status: pt agreed to sit at EOB for education in AE use for LE dress. Pt educated in reacher and sock aide to doff/chito socks and able to complete after education with SBA and cues for tech. Supine <> sit with CGA/MIN A with A for B LE's.   Barriers to return to prior living situation: decreased activity tolerance, strength, B LE swelling, decreased safety and balance.   Recommendations for discharge: TCU.   Rationale for recommendations: pt continues to require A for ADl's and functional mobility and will requires daily therapy to increase ADL and functional mobility independence and safety to PLOF.        Entered by: Nicolette Reyes 05/25/2019 2:52 PM

## 2019-05-25 NOTE — PLAN OF CARE
VSS.  Pt denies pain at this time.  Pt resting in bed.  Incision x 2 are C/D/I with staples.  Pt tolerating a regular diet.

## 2019-05-25 NOTE — PLAN OF CARE
AVSS. Still Desats to 70s without O2, on 3L NC to maintain sats. Pulmonary toilet encouraged. Incisions with staples bita. Reported intermittent stomache, resolved after having BM & flatus. Diuresing well. Up with 1, ambulated in hallway during shift. Will continue to monitor

## 2019-05-26 ENCOUNTER — APPOINTMENT (OUTPATIENT)
Dept: GENERAL RADIOLOGY | Facility: CLINIC | Age: 59
DRG: 330 | End: 2019-05-26
Attending: HOSPITALIST
Payer: MEDICARE

## 2019-05-26 ENCOUNTER — APPOINTMENT (OUTPATIENT)
Dept: PHYSICAL THERAPY | Facility: CLINIC | Age: 59
DRG: 330 | End: 2019-05-26
Attending: SURGERY
Payer: MEDICARE

## 2019-05-26 ENCOUNTER — APPOINTMENT (OUTPATIENT)
Dept: ULTRASOUND IMAGING | Facility: CLINIC | Age: 59
DRG: 330 | End: 2019-05-26
Attending: HOSPITALIST
Payer: MEDICARE

## 2019-05-26 LAB
ANION GAP SERPL CALCULATED.3IONS-SCNC: 5 MMOL/L (ref 3–14)
BUN SERPL-MCNC: 2 MG/DL (ref 7–30)
CALCIUM SERPL-MCNC: 8.8 MG/DL (ref 8.5–10.1)
CHLORIDE SERPL-SCNC: 100 MMOL/L (ref 94–109)
CO2 SERPL-SCNC: 36 MMOL/L (ref 20–32)
CREAT SERPL-MCNC: 0.61 MG/DL (ref 0.52–1.04)
GFR SERPL CREATININE-BSD FRML MDRD: >90 ML/MIN/{1.73_M2}
GLUCOSE SERPL-MCNC: 128 MG/DL (ref 70–99)
POTASSIUM SERPL-SCNC: 3.2 MMOL/L (ref 3.4–5.3)
POTASSIUM SERPL-SCNC: 4.3 MMOL/L (ref 3.4–5.3)
SODIUM SERPL-SCNC: 141 MMOL/L (ref 133–144)

## 2019-05-26 PROCEDURE — 97116 GAIT TRAINING THERAPY: CPT | Mod: GP | Performed by: PHYSICAL THERAPY ASSISTANT

## 2019-05-26 PROCEDURE — 94640 AIRWAY INHALATION TREATMENT: CPT | Mod: 76

## 2019-05-26 PROCEDURE — A9270 NON-COVERED ITEM OR SERVICE: HCPCS | Performed by: PHYSICIAN ASSISTANT

## 2019-05-26 PROCEDURE — 12000000 ZZH R&B MED SURG/OB

## 2019-05-26 PROCEDURE — 25000125 ZZHC RX 250: Performed by: PHYSICIAN ASSISTANT

## 2019-05-26 PROCEDURE — 80048 BASIC METABOLIC PNL TOTAL CA: CPT | Performed by: SURGERY

## 2019-05-26 PROCEDURE — 71046 X-RAY EXAM CHEST 2 VIEWS: CPT

## 2019-05-26 PROCEDURE — 36415 COLL VENOUS BLD VENIPUNCTURE: CPT | Performed by: SURGERY

## 2019-05-26 PROCEDURE — 97530 THERAPEUTIC ACTIVITIES: CPT | Mod: GP | Performed by: PHYSICAL THERAPY ASSISTANT

## 2019-05-26 PROCEDURE — 25000128 H RX IP 250 OP 636: Performed by: PHYSICIAN ASSISTANT

## 2019-05-26 PROCEDURE — 99222 1ST HOSP IP/OBS MODERATE 55: CPT | Performed by: HOSPITALIST

## 2019-05-26 PROCEDURE — 93970 EXTREMITY STUDY: CPT

## 2019-05-26 PROCEDURE — 25000132 ZZH RX MED GY IP 250 OP 250 PS 637: Performed by: PHYSICIAN ASSISTANT

## 2019-05-26 PROCEDURE — A9270 NON-COVERED ITEM OR SERVICE: HCPCS | Performed by: SURGERY

## 2019-05-26 PROCEDURE — 25000128 H RX IP 250 OP 636: Performed by: HOSPITALIST

## 2019-05-26 PROCEDURE — 40000275 ZZH STATISTIC RCP TIME EA 10 MIN

## 2019-05-26 PROCEDURE — 94640 AIRWAY INHALATION TREATMENT: CPT

## 2019-05-26 PROCEDURE — 99207 ZZC CONSULT E&M CHANGED TO INITIAL LEVEL: CPT | Performed by: HOSPITALIST

## 2019-05-26 PROCEDURE — 84132 ASSAY OF SERUM POTASSIUM: CPT | Performed by: SURGERY

## 2019-05-26 PROCEDURE — 25000132 ZZH RX MED GY IP 250 OP 250 PS 637: Performed by: SURGERY

## 2019-05-26 RX ORDER — FUROSEMIDE 10 MG/ML
40 INJECTION INTRAMUSCULAR; INTRAVENOUS
Status: DISCONTINUED | OUTPATIENT
Start: 2019-05-26 | End: 2019-05-28

## 2019-05-26 RX ADMIN — CITALOPRAM HYDROBROMIDE 20 MG: 20 TABLET ORAL at 20:16

## 2019-05-26 RX ADMIN — POTASSIUM CHLORIDE 20 MEQ: 1500 TABLET, EXTENDED RELEASE ORAL at 12:31

## 2019-05-26 RX ADMIN — IPRATROPIUM BROMIDE AND ALBUTEROL SULFATE 3 ML: .5; 3 SOLUTION RESPIRATORY (INHALATION) at 08:17

## 2019-05-26 RX ADMIN — IPRATROPIUM BROMIDE AND ALBUTEROL SULFATE 3 ML: .5; 3 SOLUTION RESPIRATORY (INHALATION) at 19:37

## 2019-05-26 RX ADMIN — SIMVASTATIN 20 MG: 5 TABLET, FILM COATED ORAL at 22:09

## 2019-05-26 RX ADMIN — THIOTHIXENE 5 MG: 2 CAPSULE ORAL at 22:09

## 2019-05-26 RX ADMIN — GABAPENTIN 300 MG: 300 CAPSULE ORAL at 08:08

## 2019-05-26 RX ADMIN — OXYBUTYNIN CHLORIDE 15 MG: 5 TABLET, EXTENDED RELEASE ORAL at 08:08

## 2019-05-26 RX ADMIN — IPRATROPIUM BROMIDE AND ALBUTEROL SULFATE 3 ML: .5; 3 SOLUTION RESPIRATORY (INHALATION) at 12:58

## 2019-05-26 RX ADMIN — SENNOSIDES AND DOCUSATE SODIUM 1 TABLET: 8.6; 5 TABLET ORAL at 22:09

## 2019-05-26 RX ADMIN — LAMOTRIGINE 100 MG: 100 TABLET ORAL at 08:09

## 2019-05-26 RX ADMIN — Medication 1 LOZENGE: at 21:04

## 2019-05-26 RX ADMIN — ENOXAPARIN SODIUM 40 MG: 40 INJECTION SUBCUTANEOUS at 08:08

## 2019-05-26 RX ADMIN — IPRATROPIUM BROMIDE AND ALBUTEROL SULFATE 3 ML: .5; 3 SOLUTION RESPIRATORY (INHALATION) at 15:58

## 2019-05-26 RX ADMIN — LEVOTHYROXINE SODIUM 88 MCG: 88 TABLET ORAL at 06:30

## 2019-05-26 RX ADMIN — DIVALPROEX SODIUM 500 MG: 500 TABLET, DELAYED RELEASE ORAL at 22:09

## 2019-05-26 RX ADMIN — RANITIDINE 150 MG: 150 TABLET ORAL at 08:09

## 2019-05-26 RX ADMIN — NICOTINE POLACRILEX 2 MG: 2 GUM, CHEWING ORAL at 04:19

## 2019-05-26 RX ADMIN — NICOTINE POLACRILEX 2 MG: 2 GUM, CHEWING ORAL at 21:04

## 2019-05-26 RX ADMIN — GABAPENTIN 300 MG: 300 CAPSULE ORAL at 20:16

## 2019-05-26 RX ADMIN — ACETAMINOPHEN 650 MG: 325 TABLET, FILM COATED ORAL at 15:47

## 2019-05-26 RX ADMIN — RANITIDINE 150 MG: 150 TABLET ORAL at 20:16

## 2019-05-26 RX ADMIN — NICOTINE POLACRILEX 2 MG: 2 GUM, CHEWING ORAL at 12:31

## 2019-05-26 RX ADMIN — QUETIAPINE 50 MG: 25 TABLET ORAL at 22:09

## 2019-05-26 RX ADMIN — POTASSIUM CHLORIDE 40 MEQ: 1500 TABLET, EXTENDED RELEASE ORAL at 10:11

## 2019-05-26 RX ADMIN — FUROSEMIDE 40 MG: 10 INJECTION, SOLUTION INTRAVENOUS at 15:49

## 2019-05-26 RX ADMIN — ACETAMINOPHEN 650 MG: 325 TABLET, FILM COATED ORAL at 20:16

## 2019-05-26 ASSESSMENT — ACTIVITIES OF DAILY LIVING (ADL)
ADLS_ACUITY_SCORE: 12
ADLS_ACUITY_SCORE: 13
ADLS_ACUITY_SCORE: 13
ADLS_ACUITY_SCORE: 12
ADLS_ACUITY_SCORE: 13
ADLS_ACUITY_SCORE: 12

## 2019-05-26 ASSESSMENT — MIFFLIN-ST. JEOR: SCORE: 1469.45

## 2019-05-26 NOTE — H&P
Admitted:     05/15/2019      PRIMARY CARE PHYSICIAN: Claudia Pizano MD      REASON FOR CONSULTATION:  Postop hypoxia and fluid overload.      HISTORY OF PRESENT ILLNESS:  Ms. Yousif is a 58-year-old female with a history of abdominal wall hernia surgery, paranoid schizophrenia, hypothyroidism, dyslipidemia, COPD, history of left upper lung lobectomy, who was admitted to surgery for ileostomy takedown, which was done on 05/15/2019.  She is being managed by Surgery and seems to be recovering well; however, her oxygen requirement has been trending up from 2 liters to 4 liters nasal cannula oxygen.  Hospitalist was requested consultation for further management.  She denies any fever, chills or rigors.  Denies any chest pain or shortness of breath.  Reports mild abdominal pain around the incision site.  Reports normal bowel and bladder habits.  She further notes that she has been having leg swelling since admission.  She has been given a couple of doses of Lasix this admission and has been started on DuoNeb 4 times daily.      REVIEW OF SYSTEMS:  A 10-point review of system was done and was negative apart from those mentioned in the history of present illness.      PAST MEDICAL HISTORY:   1.  Complex surgical history with multiple previous abdominal wall hernia repairs with ileostomy and now status post ileostomy takedown.   2.  Paranoid schizophrenia.   3.  Hypothyroidism.   4.  Dyslipidemia.   5.  COPD, not on any prior to admission inhalers or oxygen.   6.  History of left lung nodule, status post left upper lobe lobectomy in 09/2018.   7.  Hypothyroidism.   8.  Peripheral neuropathy.   9.  Urge incontinence.      MEDICATIONS PRIOR TO ADMISSION:  ***      ALLERGIES:  NO KNOWN DRUG ALLERGIES.      SOCIAL HISTORY:  She has a history of heavy smoking in the past but states she quit for several months now.  She has a 50-60-pack-year smoking history.  Denies alcohol use, no illicit drug use.      FAMILY HISTORY:   Reviewed and not pertinent to current presentation.      PHYSICAL EXAMINATION:   GENERAL:  The patient is conscious, alert, oriented x3, lying comfortably in bed in no apparent distress.   VITAL SIGNS:  Temperature 97.6, heart rate 55, blood pressure 106/65, saturation 95% on 4 liters.   HEENT:  Pupils are equal and reactive to light and accommodation.  Extraocular movements are intact.  Oral mucosa is moist.   NECK:  Supple, no raised JVD.   RESPIRATORY:  Lungs sounds bilaterally clear to auscultation, no wheezes or crepitation.   CARDIOVASCULAR:  Normal S1, S2, regular rate and rhythm, no murmur.   ABDOMEN:  Soft, nontender.  No guarding, rigidity or rebound tenderness.  Surgical staples in place, wound looks good, no discharge.   LOWER EXTREMITIES:  Bilateral edema present, left more than right.  No calf tenderness.   NEUROLOGIC:  No focal neurological deficits noted.  Cranial nerves II-XII grossly intact.   PSYCHIATRIC:  Normal mood and affect.      LABORATORY AND IMAGING:  Reviewed in Epic.  BMP notable for potassium of 3.2.  CBC noted with a drop in hemoglobin on admission 13.9-10.8 and has been stabilizing around 10-11.      ASSESSMENT AND PLAN:  Ms. Santa Yousif is a 58-year-old female with a history of left upper lung lobectomy for lung nodule, abdominal wall hernia, now status post ileostomy takedown, history of paranoid schizophrenia, hypothyroidism, dyslipidemia, COPD, admitted to Surgery and Hospitalist is requested consultation for postop hypoxia.   1.  Abdominal wall hernia with complex surgical history, now status post exploratory laparotomy, extensive lysis of intra-abdominal adhesions, takedown of ileostomy with ileocolostomy, primary repair of recurrent incisional hernia on 05/15/2019.  Postoperative care including pain control, thrombosis prophylaxis per Surgery.  She has been tolerating p.o.  She is on Lovenox for DVT prophylaxis.   2.  Postoperative hypoxia; postop fluid overload.  Her  postoperative hypoxia could be multifactorial.  She has a heavy smoking history, has not been using any inhalers or oxygen at home, but I suspect she does have some baseline chronic hypoxia.  Clinically she does look significantly fluid overloaded which might be contributing as well.  She has no prior history of CHF.  Last echo from 2018 showed EF of 65%.  She completed a 7-day course of Zosyn on 2019.  We will start her on Lasix 40 mg IV b.i.d. and monitor volume status.  We will check lower extremity ultrasound to rule out DVT.  While my suspicion for PE is less as she does not have any chest pain or shortness of breath, that does remain in the differential and can consider CT chest/PE studies if she does not respond to diuresis.  We will encourage incentive spirometry.  We will try to wean oxygen.   3.  History of chronic obstructive pulmonary disease and past heavy smoker.  She has been started on DuoNeb 4 times daily.  We will continue with that.  We will try to wean oxygen.   4.  Hypothyroidism.  Continue Synthroid.   5.  Paranoid schizophrenia.  Continue Celexa, Lamictal, Seroquel, thiothixene and Depakote at PTA dose.   6.  Peripheral neuropathy.  Continue Neurontin.   7.  Urge incontinence.  Continue oxybutynin.   8.  Dyslipidemia.  Continue lovastatin.   9.  Hypokalemia, on potassium supplementation protocol.   10.  Postop anemia.  Hemoglobin dropped down from 13.9-10.8 but has been stabilizing around 10-11.  Monitor CBC.      CODE STATUS:  Full code.      We thank the [facility] for letting us participate in the care of this patient.  We will follow along.         SHIRIN ESCALERA MD             D: 2019   T: 2019   MT: RODERICK      Name:     JONE MCKEON   MRN:      [id number]        Account:      [id number]   :      1960        Admitted:     05/15/2019                   Document: [id number]       cc: Claudia Pizano MD

## 2019-05-26 NOTE — PLAN OF CARE
"OT/lymph therapist: lymphedema consult received. Plan to hold on initiation of compression wraps until pt has procedures this afternoon, discussed w/ nursing- in agreement w/ plan.     OT: attempted OT session, however pt refused stating \"I just want to sit here and drink my coffee\", \"I just sat here and got comfortable\". Noted pt did just finish walking in the oliver w/ PT.   "

## 2019-05-26 NOTE — PLAN OF CARE
A&Ox4. VSS on 4L O2 ex kevyn at times 48-55bpm while sleeping. Up with x1. Tolerating low fiber diet. Generalized edema, +2 lower extremities. Midline incison with staples, MEGHNA, mild erythema. Pain controlled with oxy. Nicotine gum PRN.

## 2019-05-26 NOTE — PLAN OF CARE
A/O. Intermittently bradycardic. Still desats without O2 and takes time to compensate. JOHNSON. On 4L NC to maintain sats. Pulmonary toilet. Incisions with staples bita. Bilateral LE edema present, ambulation encouraged. Hospitalist consulted. Plan for LE US and CXR this evening. Will continue to monitor.

## 2019-05-26 NOTE — PROGRESS NOTES
"General Surgery Progress Note  05/26/19     Subjective: patient continues to report clinical improvement. Good uop in response to lasix, though continues to have significant pitting edema with some increase in 02 requirements overnight. Otherwise afebrile, pain is well controlled.     Objective: /65   Pulse 55   Temp 97.6  F (36.4  C) (Oral)   Resp 16   Ht 1.626 m (5' 4\")   Wt 91.4 kg (201 lb 9.6 oz)   SpO2 95%   BMI 34.60 kg/m    Gen: alert, sitting up in chair, no distress  CV: RRR  Pulm: nonlabored breathing, on 02 via nasal canula  Abd: soft, incision c/d/i w staples in place. No erythema  Ext: WWP    Assessment/Plan:   Santa Yousif  is a 58 year old female s/p ileostomy takedown. Improving. Continues to have 2+ pitting edema with fluctuating oxygen requirements. Abdomen looks good. Anticipate ready for discharge to TCU in the next 1-2 days.    - continue to work with therapies, they are currently still recommending TCU.   - consult to Hospitalist service, we appreciate their time and care in assisting with management of her pulmonary and volume status.    - favor further diuresis, though will defer to the Hospitalist service.   - BMP in am      Andres Madsen MD  General Surgery Resident  Pager [phone]          "

## 2019-05-26 NOTE — PLAN OF CARE
Pt up in room with assist of one and walker, lungs diminished, O2 sats mid 90's on 4L NC, lasix given with good results, denies pain, edema noted, ultrasound and chest x-ray done this shift,

## 2019-05-26 NOTE — PLAN OF CARE
Discharge Planner PT   Patient plan for discharge: Open to TCU prior to returning to her home.  Current status: Pt performed supine to sit transfer with min assist.  Sit to/from stand transfers with SBA.  Gait training x 360 ft using wheeled walker and CGA.  Slow pace though is improving.  Cues for upright posture and forward gaze.  Pt returned to supine at end of session and required mod assist to get LEs into bed and cues for logrolling.    Barriers to return to prior living situation: Lives alone, and reduced activity tolerance.  Recommendations for discharge: TCU per plan established by the PT.   Rationale for recommendations: Will continue to need skilled PT for recovery of functional mobility, and safety for negotiation of chosen residence.      Goals not met. Discussed with PTA. Will continue to see daily with continuation of current goals.            Entered by: Nicolette Haywood 05/26/2019 2:52 PM

## 2019-05-26 NOTE — PLAN OF CARE
Pt A&Ox4. VSS on 3-5L O2. Pt is often non compliant with keeping O2 on. Tolerating low fiber diet. Tylenol given for pain. Passing gas. Had BM this shift. Possible discharge to TCU tomorrow.

## 2019-05-26 NOTE — PROGRESS NOTES
ENRICO  D/I: ENRICO advised patient is medically stable to be discharged today.  ENRICO met with patient who stated that she will go to TCU today and give it a try. ENRICO called patient's sister, Cherie, who will be providing the transport for patient.  Cherie informed ENRICO that she is leaving now and should arrive to the hospital around 12:30.  Cherie confirmed that she will be bringing patient's portable oxygen tank that she had previously arranged to receive through [facility].  ENRICO called [facility] and spoke to Brianna @ [phone] to provide Brianna with an update on patient's discharge time and anticipated arrival time to the facility due the travel time involved.  Brianna requested a verbal to verbal nursing report, which ENRICO passed along to patient's nurse.  Brianna also requested that ENRICO fax the patient's discharge orders and PAS to [phone].  P: ENRICO will continue to follow and monitor for a safe, discharge plan.    D/I: ENRICO contacted Cherie to inform Cherie that SW was just notified that patient is now not ready for discharge today.  ENRICO met with patient and informed facility that patient's discharge has been delayed. Patient's sister said that she was still going to come and visit with patient.  P: ENRICO will continue to monitor and follow for a safe, discharge plan.

## 2019-05-27 ENCOUNTER — APPOINTMENT (OUTPATIENT)
Dept: OCCUPATIONAL THERAPY | Facility: CLINIC | Age: 59
DRG: 330 | End: 2019-05-27
Attending: SURGERY
Payer: MEDICARE

## 2019-05-27 LAB
ANION GAP SERPL CALCULATED.3IONS-SCNC: 3 MMOL/L (ref 3–14)
BUN SERPL-MCNC: 4 MG/DL (ref 7–30)
CALCIUM SERPL-MCNC: 9.4 MG/DL (ref 8.5–10.1)
CHLORIDE SERPL-SCNC: 102 MMOL/L (ref 94–109)
CO2 SERPL-SCNC: 37 MMOL/L (ref 20–32)
CREAT SERPL-MCNC: 0.65 MG/DL (ref 0.52–1.04)
GFR SERPL CREATININE-BSD FRML MDRD: >90 ML/MIN/{1.73_M2}
GLUCOSE SERPL-MCNC: 84 MG/DL (ref 70–99)
MAGNESIUM SERPL-MCNC: 2.1 MG/DL (ref 1.6–2.3)
PHOSPHATE SERPL-MCNC: 4.7 MG/DL (ref 2.5–4.5)
PLATELET # BLD AUTO: 556 10E9/L (ref 150–450)
POTASSIUM SERPL-SCNC: 4.5 MMOL/L (ref 3.4–5.3)
SODIUM SERPL-SCNC: 142 MMOL/L (ref 133–144)

## 2019-05-27 PROCEDURE — 97140 MANUAL THERAPY 1/> REGIONS: CPT | Mod: GO

## 2019-05-27 PROCEDURE — 25000125 ZZHC RX 250: Performed by: PHYSICIAN ASSISTANT

## 2019-05-27 PROCEDURE — 40000275 ZZH STATISTIC RCP TIME EA 10 MIN

## 2019-05-27 PROCEDURE — 25000128 H RX IP 250 OP 636: Performed by: PHYSICIAN ASSISTANT

## 2019-05-27 PROCEDURE — 94640 AIRWAY INHALATION TREATMENT: CPT

## 2019-05-27 PROCEDURE — 93005 ELECTROCARDIOGRAM TRACING: CPT

## 2019-05-27 PROCEDURE — A9270 NON-COVERED ITEM OR SERVICE: HCPCS | Performed by: PHYSICIAN ASSISTANT

## 2019-05-27 PROCEDURE — 83735 ASSAY OF MAGNESIUM: CPT | Performed by: STUDENT IN AN ORGANIZED HEALTH CARE EDUCATION/TRAINING PROGRAM

## 2019-05-27 PROCEDURE — 25000132 ZZH RX MED GY IP 250 OP 250 PS 637: Performed by: PHYSICIAN ASSISTANT

## 2019-05-27 PROCEDURE — 99207 ZZC CDG-MDM COMPONENT: MEETS MODERATE - UP CODED: CPT | Performed by: HOSPITALIST

## 2019-05-27 PROCEDURE — 84100 ASSAY OF PHOSPHORUS: CPT | Performed by: STUDENT IN AN ORGANIZED HEALTH CARE EDUCATION/TRAINING PROGRAM

## 2019-05-27 PROCEDURE — 99233 SBSQ HOSP IP/OBS HIGH 50: CPT | Performed by: HOSPITALIST

## 2019-05-27 PROCEDURE — 36415 COLL VENOUS BLD VENIPUNCTURE: CPT | Performed by: STUDENT IN AN ORGANIZED HEALTH CARE EDUCATION/TRAINING PROGRAM

## 2019-05-27 PROCEDURE — 25000128 H RX IP 250 OP 636: Performed by: HOSPITALIST

## 2019-05-27 PROCEDURE — 36415 COLL VENOUS BLD VENIPUNCTURE: CPT | Performed by: PHYSICIAN ASSISTANT

## 2019-05-27 PROCEDURE — 25000132 ZZH RX MED GY IP 250 OP 250 PS 637: Performed by: SURGERY

## 2019-05-27 PROCEDURE — 94640 AIRWAY INHALATION TREATMENT: CPT | Mod: 76

## 2019-05-27 PROCEDURE — 85049 AUTOMATED PLATELET COUNT: CPT | Performed by: PHYSICIAN ASSISTANT

## 2019-05-27 PROCEDURE — 80048 BASIC METABOLIC PNL TOTAL CA: CPT | Performed by: STUDENT IN AN ORGANIZED HEALTH CARE EDUCATION/TRAINING PROGRAM

## 2019-05-27 PROCEDURE — A9270 NON-COVERED ITEM OR SERVICE: HCPCS | Performed by: SURGERY

## 2019-05-27 PROCEDURE — 12000000 ZZH R&B MED SURG/OB

## 2019-05-27 PROCEDURE — 93010 ELECTROCARDIOGRAM REPORT: CPT | Performed by: INTERNAL MEDICINE

## 2019-05-27 RX ADMIN — SENNOSIDES AND DOCUSATE SODIUM 1 TABLET: 8.6; 5 TABLET ORAL at 20:16

## 2019-05-27 RX ADMIN — RANITIDINE 150 MG: 150 TABLET ORAL at 20:16

## 2019-05-27 RX ADMIN — ACETAMINOPHEN 650 MG: 325 TABLET, FILM COATED ORAL at 20:15

## 2019-05-27 RX ADMIN — DIVALPROEX SODIUM 500 MG: 500 TABLET, DELAYED RELEASE ORAL at 21:10

## 2019-05-27 RX ADMIN — QUETIAPINE 50 MG: 25 TABLET ORAL at 21:10

## 2019-05-27 RX ADMIN — IPRATROPIUM BROMIDE AND ALBUTEROL SULFATE 3 ML: .5; 3 SOLUTION RESPIRATORY (INHALATION) at 19:21

## 2019-05-27 RX ADMIN — OXYBUTYNIN CHLORIDE 15 MG: 5 TABLET, EXTENDED RELEASE ORAL at 08:46

## 2019-05-27 RX ADMIN — GABAPENTIN 300 MG: 300 CAPSULE ORAL at 20:15

## 2019-05-27 RX ADMIN — CITALOPRAM HYDROBROMIDE 20 MG: 20 TABLET ORAL at 19:50

## 2019-05-27 RX ADMIN — SIMVASTATIN 20 MG: 5 TABLET, FILM COATED ORAL at 21:09

## 2019-05-27 RX ADMIN — NICOTINE POLACRILEX 2 MG: 2 GUM, CHEWING ORAL at 17:47

## 2019-05-27 RX ADMIN — Medication 1 LOZENGE: at 16:09

## 2019-05-27 RX ADMIN — ENOXAPARIN SODIUM 40 MG: 40 INJECTION SUBCUTANEOUS at 08:46

## 2019-05-27 RX ADMIN — RANITIDINE 150 MG: 150 TABLET ORAL at 08:46

## 2019-05-27 RX ADMIN — GABAPENTIN 300 MG: 300 CAPSULE ORAL at 08:46

## 2019-05-27 RX ADMIN — FUROSEMIDE 40 MG: 10 INJECTION, SOLUTION INTRAVENOUS at 08:46

## 2019-05-27 RX ADMIN — LEVOTHYROXINE SODIUM 88 MCG: 88 TABLET ORAL at 08:46

## 2019-05-27 RX ADMIN — THIOTHIXENE 5 MG: 2 CAPSULE ORAL at 22:25

## 2019-05-27 RX ADMIN — FUROSEMIDE 40 MG: 10 INJECTION, SOLUTION INTRAVENOUS at 16:09

## 2019-05-27 RX ADMIN — LAMOTRIGINE 100 MG: 100 TABLET ORAL at 08:46

## 2019-05-27 RX ADMIN — IPRATROPIUM BROMIDE AND ALBUTEROL SULFATE 3 ML: .5; 3 SOLUTION RESPIRATORY (INHALATION) at 12:03

## 2019-05-27 ASSESSMENT — MIFFLIN-ST. JEOR: SCORE: 1440

## 2019-05-27 ASSESSMENT — ACTIVITIES OF DAILY LIVING (ADL)
ADLS_ACUITY_SCORE: 12
ADLS_ACUITY_SCORE: 14
ADLS_ACUITY_SCORE: 12
ADLS_ACUITY_SCORE: 14

## 2019-05-27 NOTE — PROGRESS NOTES
"General Surgery Progress Note    Subjective: pt feeling better overall. Diuresed very well yesterday. Minimal abdominal pain    Objective: BP (!) 142/109 (BP Location: Left arm)   Pulse 144   Temp 98  F (36.7  C) (Oral)   Resp 16   Ht 1.626 m (5' 4\")   Wt 91.4 kg (201 lb 9.6 oz)   SpO2 95%   BMI 34.60 kg/m    Gen: alert, no distress  CV: RRR  Pulm: nonlabored breathing  Abd: soft, incision is c/d/i with staples in place  Ext: WWP    Assessment/Plan:   Santa Yousif  is a 58 year old female s/p ileostomy reversal. Appreciate hospitalist assistance with hypoxia/volume overload/aggressive diuresis. Pt improving.   - likely to TCU tomorrow  - will need follow up for staple removal with Dr. Jesus Godinez MD  [facility]  [phone]              "

## 2019-05-27 NOTE — PLAN OF CARE
VSS. 3-4L O2, weaning as able. Minimal pain, given prn tylenol x1. Abdominal incision MEGHNA/staples. BS active, passing flatus. LS diminished, scheduled nebs. BLEs +2 edema, elevated on pillows. SBA with walker. Ambulate halls x1 overnight. Voiding with AUO. Low fiber diet. Nicotine gum given prn.

## 2019-05-27 NOTE — PROGRESS NOTES
05/27/19 0740   Edema General Information   Onset of Edema 05/20/19   Affected Body Part(s) Left UE;Left LE;Right LE   Edema Etiology Surgery   Edema Examination/Assessment   Skin Condition Pitting;Dryness   Pitting Assessment 2+ mayo LE's, mostly ankles and dorsum of feet. No pitting on L arm but edema noted   General Therapy Interventions   Edema: Planned Interventions Gradient compression bandaging;Edema exercises;Education   Total Evaluation Time   Total Evaluation Time (Minutes) 8

## 2019-05-27 NOTE — CONSULTS
Admitted:     05/15/2019      PRIMARY CARE PHYSICIAN: Claudia Pizano MD      REASON FOR CONSULTATION:  Postop hypoxia and fluid overload.      HISTORY OF PRESENT ILLNESS:  Ms. Yousif is a 58-year-old female with a history of abdominal wall hernia surgery, paranoid schizophrenia, hypothyroidism, dyslipidemia, COPD, history of left upper lung lobectomy, who was admitted to surgery for ileostomy takedown, which was done on 05/15/2019.  She is being managed by Surgery and seems to be recovering well; however, her oxygen requirement has been trending up from 2 liters to 4 liters nasal cannula oxygen.  Hospitalist was requested consultation for further management.  She denies any fever, chills or rigors.  Denies any chest pain or shortness of breath.  Reports mild abdominal pain around the incision site.  Reports normal bowel and bladder habits.  She further notes that she has been having leg swelling since admission.  She has been given a couple of doses of Lasix this admission and has been started on DuoNeb 4 times daily.      REVIEW OF SYSTEMS:  A 10-point review of system was done and was negative apart from those mentioned in the history of present illness.      PAST MEDICAL HISTORY:   1.  Complex surgical history with multiple previous abdominal wall hernia repairs with ileostomy and now status post ileostomy takedown.   2.  Paranoid schizophrenia.   3.  Hypothyroidism.   4.  Dyslipidemia.   5.  COPD, not on any prior to admission inhalers or oxygen.   6.  History of left lung nodule, status post left upper lobe lobectomy in 09/2018.   7.  Hypothyroidism.   8.  Peripheral neuropathy.   9.  Urge incontinence.      MEDICATIONS PRIOR TO ADMISSION:  Medications Prior to Admission   Medication Sig Dispense Refill Last Dose     aspirin 81 MG EC tablet Take 81 mg by mouth every morning   5/9/2019 at am     citalopram (CELEXA) 20 MG tablet Take 20 mg by mouth every evening    5/14/2019 at pm     divalproex sodium  delayed-release (DEPAKOTE) 500 MG DR tablet Take 500 mg by mouth At Bedtime (Dose cut into thirds- otherwise tablet goes straight to her colostomy bag)   5/14/2019 at pm     gabapentin (NEURONTIN) 300 MG capsule Take 300 mg by mouth 2 times daily    5/14/2019 at pm     lamoTRIgine (LAMICTAL) 200 MG tablet Take 100 mg by mouth daily (0.5 x 200 mg = 100 mg dose)    5/13/2019     levothyroxine (SYNTHROID) 88 MCG tablet Take 88 mcg by mouth daily    5/13/2019     lovastatin (MEVACOR) 40 MG tablet Take 40 mg by mouth At Bedtime    5/14/2019 at pm     nicotine (COMMIT) 2 MG lozenge Place 2 mg inside cheek every hour as needed for smoking cessation   5/14/2019 at prn     nicotine (NICORETTE) 2 MG gum Place 2 mg inside cheek every hour as needed for smoking cessation   5/15/2019 at 0530     oxybutynin ER (DITROPAN XL) 15 MG 24 hr tablet Take 15 mg by mouth every morning    5/13/2019     QUEtiapine (SEROQUEL) 100 MG tablet Take 50 mg by mouth At Bedtime (0.5 x 100 mg = 50 mg dose)    5/14/2019 at pm     thiothixene (NAVANE) 1 MG capsule Take 5 mg by mouth At Bedtime (takes 5 x 1mg tablet = 5mg dose)   5/14/2019 at pm     Vitamin D, Cholecalciferol, 1000 units CAPS Take 1,000 Units by mouth daily    5/10/2019         ALLERGIES:  NO KNOWN DRUG ALLERGIES.      SOCIAL HISTORY:  She has a history of heavy smoking in the past but states she quit for several months now.  She has a 50-60-pack-year smoking history.  Denies alcohol use, no illicit drug use.      FAMILY HISTORY:  Reviewed and not pertinent to current presentation.      PHYSICAL EXAMINATION:   GENERAL:  The patient is conscious, alert, oriented x3, lying comfortably in bed in no apparent distress.   VITAL SIGNS:  Temperature 97.6, heart rate 55, blood pressure 106/65, saturation 95% on 4 liters.   HEENT:  Pupils are equal and reactive to light and accommodation.  Extraocular movements are intact.  Oral mucosa is moist.   NECK:  Supple, no raised JVD.   RESPIRATORY:   Lungs sounds bilaterally clear to auscultation, no wheezes or crepitation.   CARDIOVASCULAR:  Normal S1, S2, regular rate and rhythm, no murmur.   ABDOMEN:  Soft, nontender.  No guarding, rigidity or rebound tenderness.  Surgical staples in place, wound looks good, no discharge.   LOWER EXTREMITIES:  Bilateral edema present, left more than right.  No calf tenderness.   NEUROLOGIC:  No focal neurological deficits noted.  Cranial nerves II-XII grossly intact.   PSYCHIATRIC:  Normal mood and affect.      LABORATORY AND IMAGING:  Reviewed in Epic.  BMP notable for potassium of 3.2.  CBC noted with a drop in hemoglobin on admission 13.9-10.8 and has been stabilizing around 10-11.      ASSESSMENT AND PLAN:  Ms. Santa Yousif is a 58-year-old female with a history of left upper lung lobectomy for lung nodule, abdominal wall hernia, now status post ileostomy takedown, history of paranoid schizophrenia, hypothyroidism, dyslipidemia, COPD, admitted to Surgery and Hospitalist is requested consultation for postop hypoxia.   1.  Abdominal wall hernia with complex surgical history, now status post exploratory laparotomy, extensive lysis of intra-abdominal adhesions, takedown of ileostomy with ileocolostomy, primary repair of recurrent incisional hernia on 05/15/2019.  Postoperative care including pain control, thrombosis prophylaxis per Surgery.  She has been tolerating p.o.  She is on Lovenox for DVT prophylaxis.   2.  Postoperative hypoxia; postop fluid overload.  Her postoperative hypoxia could be multifactorial.  She has a heavy smoking history, has not been using any inhalers or oxygen at home, but I suspect she does have some baseline chronic hypoxia.  Clinically she does look significantly fluid overloaded which might be contributing as well.  She has no prior history of CHF.  Last echo from 09/2018 showed EF of 65%.  She completed a 7-day course of Zosyn on 05/22/2019.  We will start her on Lasix 40 mg IV b.i.d. and  monitor volume status.  We will check lower extremity ultrasound to rule out DVT.  While my suspicion for PE is less as she does not have any chest pain or shortness of breath, that does remain in the differential and can consider CT chest/PE studies if she does not respond to diuresis.  We will encourage incentive spirometry.  We will try to wean oxygen.   3.  History of chronic obstructive pulmonary disease and past heavy smoker.  She has been started on DuoNeb 4 times daily.  We will continue with that.  We will try to wean oxygen.   4.  Hypothyroidism.  Continue Synthroid.   5.  Paranoid schizophrenia.  Continue Celexa, Lamictal, Seroquel, thiothixene and Depakote at PTA dose.   6.  Peripheral neuropathy.  Continue Neurontin.   7.  Urge incontinence.  Continue oxybutynin.   8.  Dyslipidemia.  Continue lovastatin.   9.  Hypokalemia, on potassium supplementation protocol.   10.  Postop anemia.  Hemoglobin dropped down from 13.9-10.8 but has been stabilizing around 10-11.  Monitor CBC.      CODE STATUS:  Full code.      We thank the Surgical Service for letting us participate in the care of this patient.  We will follow along.         SHIRIN ESCALERA MD             D: 2019   T: 2019   MT: RODERICK      Name:     JONE MCKEON   MRN:      [id number]        Account:      [id number]   :      1960        Admitted:     05/15/2019                   Document: [id number]       cc: Claudia Pizano MD

## 2019-05-27 NOTE — PLAN OF CARE
A/O. AVSS ex.bradycardic. JOHNSON. On 3L NC to maintain sats. Pulmonary toilet encouraged. Incisions with staples bita. Bilateral LE edema present, ambulation encouraged. Diuresing well. Will continue to monitor.

## 2019-05-27 NOTE — PLAN OF CARE
Discharge Planner OT   Patient plan for discharge: TCU  Current status: Lymph eval/tx initiated today. BLE s cleansed and lotion applied to BLE s to maintain skin integrity prior to wrapping. TG soft applied from base of toes to just under knee. Applied short stretch bandages from base of toes to below knee. Pt tolerated well. Pt educated in s/s of intolerance of wraps, LE exercises, and elevation to reduce edema to improve indep and ease w/ mobility and ADL s. Coordinated w/ nursing, plan for nursing to remove tomorrow at 7am and OT to return to re-wrap at 8 am.     Barriers to return to prior living situation: Lives alone, decreased functional act tolerance, BLE edema, decreased strength, cognition, and balance   Recommendations for discharge: TCU w/ lymph services  Rationale for recommendations: Pt will benefit from continued therapy intervention to improve above stated deficits to facilitate safe return home to [facility].        Entered by: Chitra Tomas 05/27/2019 8:21 AM

## 2019-05-27 NOTE — PROGRESS NOTES
[facility]  Hospitalist Progress Note        Alan Hunt MD   05/27/2019        Interval History:      -reports feeling better and diuresing well         Assessment and Plan:        Ms. Santa Yousif is a 58-year-old female with a history of left upper lung lobectomy for lung nodule, abdominal wall hernia, now status post ileostomy takedown, history of paranoid schizophrenia, hypothyroidism, dyslipidemia, COPD, admitted to Surgery and Hospitalist is requested consultation for postop hypoxia.     # Abdominal wall hernia with complex surgical history, now status post exploratory laparotomy, extensive lysis of intra-abdominal adhesions, takedown of ileostomy with ileocolostomy, primary repair of recurrent incisional hernia on 05/15/2019.   - surgical cares including pain control, DVT prophylaxis per Surgery.  She has been tolerating p.o.  She is on Lovenox for DVT prophylaxis.     # Postoperative hypoxia  # postop fluid overload.    - likely fluid overload sec to perioperative fluid resuscitation  - She has a heavy smoking history, has not been using any inhalers or oxygen at home, but I suspect she does have some baseline chronic hypoxia as well  - no prior history of CHF.  Last echo from 09/2018 showed EF of 65%.  She completed a 7-day course of Zosyn on 05/22/2019.   - Chest x-ray reviewed and noted with moderate bilateral pleural effusion  - ultrasound lower extremity negative for DVT   - Diuresing well with IV Lasix; negative 3750 over past 24 hrs; oxygen requirement down to 3 L, will wean further as able  - continue Lasix 40 mg IV BID  - encourage incentive spirometry.  We will try to wean oxygen.    # Sinus bradycardia  - HR noted intermittently in 40s--low 50s; patient asymptomatic; EKG with sinus bradycardia  -will monitor on telemetry; can discontinue after 24 hours if remains in sinus rhythm    3.  History of chronic obstructive pulmonary disease and past heavy smoker.  She has  "been started on DuoNeb 4 times daily.  We will continue with that.  We will try to wean oxygen.   4.  Hypothyroidism.  Continue Synthroid.   5.  Paranoid schizophrenia.  Continue Celexa, Lamictal, Seroquel, thiothixene and Depakote at PTA dose.   6.  Peripheral neuropathy.  Continue Neurontin.   7.  Urge incontinence.  Continue oxybutynin.   8.  Dyslipidemia.  Continue lovastatin.   9.  Hypokalemia, on potassium supplementation protocol.   10.  Postop anemia.  Hemoglobin dropped down from 13.9-10.8 but has been stabilizing around 10-11.  Monitor CBC.      CODE STATUS:  Full code.     Disposition: per primary, likely in 1 to 2 days pending diuresis and ability to wean off oxygen                     Physical Exam:      Blood pressure (!) 142/109, pulse 50, temperature 98  F (36.7  C), temperature source Oral, resp. rate 16, height 1.626 m (5' 4\"), weight 91.4 kg (201 lb 9.6 oz), SpO2 95 %.  Vitals:    05/19/19 0129 05/25/19 0734 05/26/19 0644   Weight: 78.1 kg (172 lb 1.6 oz) 91.4 kg (201 lb 6.4 oz) 91.4 kg (201 lb 9.6 oz)     Vital Signs with Ranges  Temp:  [97.3  F (36.3  C)-98  F (36.7  C)] 98  F (36.7  C)  Pulse:  [44-58] 50  Heart Rate:  [52-59] 52  Resp:  [16] 16  BP: (102-142)/() 142/109  SpO2:  [92 %-95 %] 95 %  I/O's Last 24 hours  I/O last 3 completed shifts:  In: 1890 [P.O.:1890]  Out: 5800 [Urine:5800]    Constitutional: Alert, awake and oriented X 3; lying comfortably in bed in no apparent distress   HEENT: Pupils equal and reactive to light and accomodation, EOMI intact; neck supple no raised JVD or rigidity    Oral cavity: Moist mucosa   Cardiovascular: Normal s1 s2, bradycardic, no murmur   Lungs: B/l clear to auscultation, no wheezes or crepitations   Abdomen: Soft, nt, nd, no guarding, rigidity or rebound; BS +; Surgical staples in place, wound looks good, no discharge   LE : B/l edema ++; compression bandage in place   Musculoskeletal: Power 5/5 in all extremities   Neuro: No focal " neurological deficits noted, CN II to XII grossly intact   Psychiatry: normal mood and affect                Medications:          citalopram  20 mg Oral QPM     divalproex sodium delayed-release  500 mg Oral At Bedtime     enoxaparin  40 mg Subcutaneous Q24H     furosemide  40 mg Intravenous BID     gabapentin  300 mg Oral BID     ipratropium - albuterol 0.5 mg/2.5 mg/3 mL  3 mL Nebulization 4x daily     lamoTRIgine  100 mg Oral Daily     levothyroxine  88 mcg Oral QAM AC     oxybutynin ER  15 mg Oral QAM     QUEtiapine  50 mg Oral At Bedtime     ranitidine  150 mg Oral Q12H     senna-docusate  1 tablet Oral BID    Or     senna-docusate  2 tablet Oral BID     simvastatin  20 mg Oral At Bedtime     sodium chloride (PF)  3 mL Intracatheter Q8H     zz extemporaneous template  5 mg Oral At Bedtime     PRN Meds: acetaminophen, sore throat lozenge, cyclobenzaprine, diphenhydrAMINE **OR** diphenhydrAMINE, lidocaine 4%, lidocaine (buffered or not buffered), naloxone, nicotine, nicotine, ondansetron **OR** ondansetron, oxyCODONE, potassium chloride, potassium chloride with lidocaine, potassium chloride, potassium chloride, potassium chloride, prochlorperazine **OR** prochlorperazine, sodium chloride (PF)         Data:      All new lab and imaging data was reviewed.   Recent Labs   Lab Test 05/27/19  0514 05/25/19  0809 05/24/19  0640  05/20/19  1108 05/18/19  0617  09/21/18  0625   WBC  --  9.0  --   --  8.8 11.4*   < > 8.1   HGB  --  10.4*  --   --  11.1* 10.8*   < > 13.7   MCV  --  104*  --   --  101* 104*   < > 102*   * 534* 463*   < > 309 247   < > 284   INR  --   --   --   --   --   --   --  0.95    < > = values in this interval not displayed.      Recent Labs   Lab Test 05/27/19  0514 05/26/19  1557 05/26/19  0751  05/25/19  0809     --  141  --  141   POTASSIUM 4.5 4.3 3.2*   < > 3.5   CHLORIDE 102  --  100  --  101   CO2 37*  --  36*  --  38*   BUN 4*  --  2*  --  1*   CR 0.65  --  0.61  --  0.60    ANIONGAP 3  --  5  --  2*   YOUSIF 9.4  --  8.8  --  8.8   GLC 84  --  128*  --  97    < > = values in this interval not displayed.     No lab results found.    Invalid input(s): TROP, TROPONINIES

## 2019-05-28 ENCOUNTER — APPOINTMENT (OUTPATIENT)
Dept: OCCUPATIONAL THERAPY | Facility: CLINIC | Age: 59
DRG: 330 | End: 2019-05-28
Attending: SURGERY
Payer: MEDICARE

## 2019-05-28 VITALS
BODY MASS INDEX: 31.88 KG/M2 | HEART RATE: 63 BPM | RESPIRATION RATE: 16 BRPM | TEMPERATURE: 97.5 F | SYSTOLIC BLOOD PRESSURE: 107 MMHG | DIASTOLIC BLOOD PRESSURE: 63 MMHG | WEIGHT: 186.7 LBS | HEIGHT: 64 IN | OXYGEN SATURATION: 92 %

## 2019-05-28 LAB
ANION GAP SERPL CALCULATED.3IONS-SCNC: 3 MMOL/L (ref 3–14)
BUN SERPL-MCNC: 6 MG/DL (ref 7–30)
CALCIUM SERPL-MCNC: 9 MG/DL (ref 8.5–10.1)
CHLORIDE SERPL-SCNC: 99 MMOL/L (ref 94–109)
CO2 SERPL-SCNC: 37 MMOL/L (ref 20–32)
CREAT SERPL-MCNC: 0.75 MG/DL (ref 0.52–1.04)
GFR SERPL CREATININE-BSD FRML MDRD: 86 ML/MIN/{1.73_M2}
GLUCOSE SERPL-MCNC: 86 MG/DL (ref 70–99)
INTERPRETATION ECG - MUSE: NORMAL
POTASSIUM SERPL-SCNC: 4.2 MMOL/L (ref 3.4–5.3)
SODIUM SERPL-SCNC: 139 MMOL/L (ref 133–144)

## 2019-05-28 PROCEDURE — 25000125 ZZHC RX 250: Performed by: PHYSICIAN ASSISTANT

## 2019-05-28 PROCEDURE — 36415 COLL VENOUS BLD VENIPUNCTURE: CPT | Performed by: HOSPITALIST

## 2019-05-28 PROCEDURE — 99232 SBSQ HOSP IP/OBS MODERATE 35: CPT | Performed by: HOSPITALIST

## 2019-05-28 PROCEDURE — 25000128 H RX IP 250 OP 636: Performed by: HOSPITALIST

## 2019-05-28 PROCEDURE — 25000132 ZZH RX MED GY IP 250 OP 250 PS 637: Performed by: PHYSICIAN ASSISTANT

## 2019-05-28 PROCEDURE — A9270 NON-COVERED ITEM OR SERVICE: HCPCS | Performed by: PHYSICIAN ASSISTANT

## 2019-05-28 PROCEDURE — 94640 AIRWAY INHALATION TREATMENT: CPT

## 2019-05-28 PROCEDURE — 94640 AIRWAY INHALATION TREATMENT: CPT | Mod: 76

## 2019-05-28 PROCEDURE — 25000128 H RX IP 250 OP 636: Performed by: PHYSICIAN ASSISTANT

## 2019-05-28 PROCEDURE — 97140 MANUAL THERAPY 1/> REGIONS: CPT | Mod: GO | Performed by: OCCUPATIONAL THERAPIST

## 2019-05-28 PROCEDURE — 80048 BASIC METABOLIC PNL TOTAL CA: CPT | Performed by: HOSPITALIST

## 2019-05-28 RX ORDER — FUROSEMIDE 20 MG
20 TABLET ORAL DAILY
Status: DISCONTINUED | OUTPATIENT
Start: 2019-05-29 | End: 2019-05-28 | Stop reason: HOSPADM

## 2019-05-28 RX ORDER — FUROSEMIDE 20 MG
20 TABLET ORAL DAILY
Qty: 5 TABLET | Refills: 0 | Status: SHIPPED | OUTPATIENT
Start: 2019-05-29 | End: 2019-11-18

## 2019-05-28 RX ADMIN — RANITIDINE 150 MG: 150 TABLET ORAL at 08:57

## 2019-05-28 RX ADMIN — ENOXAPARIN SODIUM 40 MG: 40 INJECTION SUBCUTANEOUS at 08:57

## 2019-05-28 RX ADMIN — GABAPENTIN 300 MG: 300 CAPSULE ORAL at 08:58

## 2019-05-28 RX ADMIN — OXYBUTYNIN CHLORIDE 15 MG: 5 TABLET, EXTENDED RELEASE ORAL at 08:56

## 2019-05-28 RX ADMIN — SENNOSIDES AND DOCUSATE SODIUM 2 TABLET: 8.6; 5 TABLET ORAL at 08:56

## 2019-05-28 RX ADMIN — IPRATROPIUM BROMIDE AND ALBUTEROL SULFATE 3 ML: .5; 3 SOLUTION RESPIRATORY (INHALATION) at 11:43

## 2019-05-28 RX ADMIN — LAMOTRIGINE 100 MG: 100 TABLET ORAL at 08:57

## 2019-05-28 RX ADMIN — LEVOTHYROXINE SODIUM 88 MCG: 88 TABLET ORAL at 08:56

## 2019-05-28 RX ADMIN — IPRATROPIUM BROMIDE AND ALBUTEROL SULFATE 3 ML: .5; 3 SOLUTION RESPIRATORY (INHALATION) at 07:57

## 2019-05-28 ASSESSMENT — ACTIVITIES OF DAILY LIVING (ADL)
ADLS_ACUITY_SCORE: 14

## 2019-05-28 ASSESSMENT — MIFFLIN-ST. JEOR: SCORE: 1401.87

## 2019-05-28 NOTE — PLAN OF CARE
Pt. A & O x 4, forgetful.  95% O2 sat on 3L O2.  Going to TCU on O2.  Midline incision closed w/ staples; removed every other staple.  Stand by assist x 1 w/ walker.  Saline locked.  BM+, Flatus+.  Tele:  SB.  BLE 2+ edema; wraps in place.  Regular diet.  Pt. Being picked up by sister at 1430; transporting pt., to TCU.  TCU called ([phone]) with report.  Packet printed for TCU.  Pt. Discharged to TCU w/ sister at 1430.

## 2019-05-28 NOTE — PROGRESS NOTES
"CLINICAL NUTRITION SERVICES - REASSESSMENT NOTE      Malnutrition: 5/22  % Weight Loss:  None noted  % Intake:  </= 50% for >/= 5 days (severe malnutrition)  Subcutaneous Fat Loss:  None observed  Muscle Loss:  None observed  Fluid Retention:  None noted     Malnutrition Diagnosis: Patient does not meet two of the above criteria necessary for diagnosing malnutrition       EVALUATION OF PROGRESS TOWARD GOALS   Diet: Low Fiber    Intake/Tolerance:   Patient states a great appetite, \"better\" than at home. \"I can't stay away from the food!\"   Flow sheets indicate % meal consumption   She loves the Russian toast, pie, yogurt, and pizza    NEW FINDINGS:   Chart reviewed, discharge pending 1-2 days to TCU    Previous Goals:   Pt will advance to solids in 2-3 days.  Evaluation: Met    Previous Nutrition Diagnosis:   Inadequate oral intake related to altered GI function as evidenced by 8 days NPO/liquids  Evaluation: Improving      CURRENT NUTRITION DIAGNOSIS  No nutrition diagnosis identified at this time     INTERVENTIONS  Recommendations / Nutrition Prescription  Continue low fiber diet per Surgery    Implementation  Nutrition Education - Provided detailed handout on Low Fiber diet. All questions were answered.     Goals  Patient to consume >75% meals TID    MONITORING AND EVALUATION:  Progress towards goals will be monitored and evaluated per protocol and Practice Guidelines      Kiara Noriega RD, LD  Clinical Dietitian     "

## 2019-05-28 NOTE — PROGRESS NOTES
[facility]  Hospitalist Progress Note        Alan Hunt MD   05/28/2019        Interval History:      -reports feeling better and diuresing well; no chest pain or SOB         Assessment and Plan:        Ms. Santa Yousif is a 58-year-old female with a history of left upper lung lobectomy for lung nodule, abdominal wall hernia, now status post ileostomy takedown, history of paranoid schizophrenia, hypothyroidism, dyslipidemia, COPD, admitted to Surgery and Hospitalist was requested consultation for postop hypoxia.     # Abdominal wall hernia with complex surgical history, now status post exploratory laparotomy, extensive lysis of intra-abdominal adhesions, takedown of ileostomy with ileocolostomy, primary repair of recurrent incisional hernia on 05/15/2019.   - surgical cares including pain control, DVT prophylaxis per Surgery.  She has been tolerating p.o.  She is on Lovenox for DVT prophylaxis.     # Postoperative hypoxia  # postop fluid overload.  # h/o COPD    - likely multifactorial sec to fluid overload sec to perioperative fluid resuscitation and also with h/o COPD  - She has a heavy smoking history, has not been using any inhalers or oxygen at home, but I suspect she does have some baseline chronic hypoxia as well owing to her COPD  - no prior history of CHF.  Last echo from 09/2018 showed EF of 65%.  She completed a 7-day course of Zosyn on 05/22/2019.   - Chest x-ray reviewed and noted with moderate bilateral pleural effusion  - ultrasound lower extremity negative for DVT   - Diuresing well with IV Lasix; negative 5000 over past 24 hrs; oxygen requirement still at 3 L and BP soft in 90s  - will discontinue IV lasix, switch to PO Lasix 20 mg po daily for 3-5 days and will need to reassess volume status at TCU or PCP follow up visit  - at this point it seems she will require some home oxygen  - encourage incentive spirometry    # Sinus bradycardia  - HR noted intermittently in  "40s--low 50s; patient asymptomatic; EKG with sinus bradycardia  - telemetry noted with hilton kevyn with HR in 50s; can discontinue tele    3.  History of chronic obstructive pulmonary disease and past heavy smoker.  She has been started on DuoNeb 4 times daily.  We will continue with that.  We will try to wean oxygen.   - plan for home oxygen as above    4.  Hypothyroidism.  Continue Synthroid.   5.  Paranoid schizophrenia.  Continue Celexa, Lamictal, Seroquel, thiothixene and Depakote at PTA dose.   6.  Peripheral neuropathy.  Continue Neurontin.   7.  Urge incontinence.  Continue oxybutynin.   8.  Dyslipidemia.  Continue lovastatin.   9.  Hypokalemia, on potassium supplementation protocol.   10.  Postop anemia.  Hemoglobin dropped down from 13.9-10.8 but has been stabilizing around 10-11.  Monitor CBC.      CODE STATUS:  Full code.     Disposition: per primary to TCU; will need home oxygen; medically stable for discharge                      Physical Exam:      Blood pressure 95/54, pulse 53, temperature 97.5  F (36.4  C), temperature source Oral, resp. rate 16, height 1.626 m (5' 4\"), weight 84.7 kg (186 lb 11.2 oz), SpO2 95 %.  Vitals:    05/26/19 0644 05/27/19 0730 05/28/19 0628   Weight: 91.4 kg (201 lb 9.6 oz) 88.5 kg (195 lb 1.7 oz) 84.7 kg (186 lb 11.2 oz)     Vital Signs with Ranges  Temp:  [97.5  F (36.4  C)-98.9  F (37.2  C)] 97.5  F (36.4  C)  Pulse:  [53-59] 53  Heart Rate:  [52-62] 53  Resp:  [16] 16  BP: ()/(53-72) 95/54  SpO2:  [93 %-96 %] 95 %  I/O's Last 24 hours  I/O last 3 completed shifts:  In: 400 [P.O.:400]  Out: 4600 [Urine:4600]    Constitutional: Alert, awake and oriented X 3; lying comfortably in bed in no apparent distress   HEENT: Pupils equal and reactive to light and accomodation, EOMI intact; neck supple no raised JVD or rigidity    Oral cavity: Moist mucosa   Cardiovascular: Normal s1 s2, bradycardic, no murmur   Lungs: B/l clear to auscultation, no wheezes or crepitations "   Abdomen: Soft, nt, nd, no guarding, rigidity or rebound; BS +; Surgical staples in place, wound looks good, no discharge   LE : B/l edema +; compression bandage in place   Musculoskeletal: Power 5/5 in all extremities   Neuro: No focal neurological deficits noted, CN II to XII grossly intact   Psychiatry: normal mood and affect                Medications:          citalopram  20 mg Oral QPM     divalproex sodium delayed-release  500 mg Oral At Bedtime     enoxaparin  40 mg Subcutaneous Q24H     furosemide  40 mg Intravenous BID     gabapentin  300 mg Oral BID     ipratropium - albuterol 0.5 mg/2.5 mg/3 mL  3 mL Nebulization 4x daily     lamoTRIgine  100 mg Oral Daily     levothyroxine  88 mcg Oral QAM AC     oxybutynin ER  15 mg Oral QAM     QUEtiapine  50 mg Oral At Bedtime     ranitidine  150 mg Oral Q12H     senna-docusate  1 tablet Oral BID    Or     senna-docusate  2 tablet Oral BID     simvastatin  20 mg Oral At Bedtime     sodium chloride (PF)  3 mL Intracatheter Q8H     zz extemporaneous template  5 mg Oral At Bedtime     PRN Meds: acetaminophen, sore throat lozenge, cyclobenzaprine, diphenhydrAMINE **OR** diphenhydrAMINE, lidocaine 4%, lidocaine (buffered or not buffered), naloxone, nicotine, nicotine, ondansetron **OR** ondansetron, oxyCODONE, potassium chloride, potassium chloride with lidocaine, potassium chloride, potassium chloride, potassium chloride, prochlorperazine **OR** prochlorperazine, sodium chloride (PF)         Data:      All new lab and imaging data was reviewed.   Recent Labs   Lab Test 05/27/19  0514 05/25/19  0809 05/24/19  0640  05/20/19  1108 05/18/19  0617  09/21/18  0625   WBC  --  9.0  --   --  8.8 11.4*   < > 8.1   HGB  --  10.4*  --   --  11.1* 10.8*   < > 13.7   MCV  --  104*  --   --  101* 104*   < > 102*   * 534* 463*   < > 309 247   < > 284   INR  --   --   --   --   --   --   --  0.95    < > = values in this interval not displayed.      Recent Labs   Lab Test  05/28/19  0714 05/27/19  0514 05/26/19  1557 05/26/19  0751    142  --  141   POTASSIUM 4.2 4.5 4.3 3.2*   CHLORIDE 99 102  --  100   CO2 37* 37*  --  36*   BUN 6* 4*  --  2*   CR 0.75 0.65  --  0.61   ANIONGAP 3 3  --  5   YOUSIF 9.0 9.4  --  8.8   GLC 86 84  --  128*     No lab results found.    Invalid input(s): TROP, TROPONINIES

## 2019-05-28 NOTE — PLAN OF CARE
A/Ox4. AVSS on 3L O2. Midline incision CDI. Up with assist of 1. Lymphedema wraps removed this am per orders. Bs+, flatus+, LS-dim. Tele- sinus kevyn. Plan is to discharge in 1-2 days to tcu.

## 2019-05-28 NOTE — PROGRESS NOTES
"General Surgery Note    Stable S/P Ileostomy Takedown  POD13    -Spoke with Dr. Hunt  -Discharge to TCU  -Appreciate Hospitalist following  -Will continue lasix for a little longer.  -Plan for supplemental O2.  Likely has some COPD at baseline.  -Remove every other staple now.  Remaining stapes out at TCU in 2 days.  -Follow up with Dr. Lee in 1-2 weeks.    Doing well.  Eating well.  No CP or SOB.  Feels like breathing at baseline.  No abdominal pain.  Full return of bowel function.  No nausea.  Feels getting stronger.    NAD, pleasant, alert but forgetful.  Baseline UE tremor  /63 (BP Location: Left arm)   Pulse 63   Temp 97.5  F (36.4  C) (Oral)   Resp 16   Ht 1.626 m (5' 4\")   Wt 84.7 kg (186 lb 11.2 oz)   SpO2 92%   BMI 32.05 kg/m      Intake/Output Summary (Last 24 hours) at 5/28/2019 1141  Last data filed at 5/28/2019 1105  Gross per 24 hour   Intake 400 ml   Output 6450 ml   Net -6050 ml     Abd: soft, NT, ND  Inc: staples still in.  Some redness around these, but otherwise looks ok.  Dry skin.  "

## 2019-05-28 NOTE — PLAN OF CARE
A&O, VSS. Lung sounds diminished. Bowel sounds active, +flatus. Adequate urine output. Incision enclosed w/ staples MEGHNA. 2+ edema BLE.BLE Lymphedema wraps in place Ambulates assist x1. Tolerating low fiber diet. Pain controlled tylenol.

## 2019-05-28 NOTE — PLAN OF CARE
PT: Pt discharged prior to PT treatment session.  Physical Therapy Discharge Summary    Reason for therapy discharge:    Discharged to transitional care facility.    Progress towards therapy goal(s). See goals on Care Plan in [facility] electronic health record for goal details.  Goals partially met.  Barriers to achieving goals:   limited tolerance for therapy and Discharged to TCU, SBA to Min A with functional mobility at discharge .    Therapy recommendation(s):    Continued therapy is recommended.  Rationale/Recommendations:  PT will benefit from continued skilled rehab services to progress independence and safety with functional mobility..

## 2019-05-28 NOTE — DISCHARGE SUMMARY
[facility] Discharge Summary    Santa Yousif MRN# [id number]   Age: 58 year old YOB: 1960     Date of Admission:  5/15/2019  Date of Discharge:  5/28/2019  Admitting Provider:  Rudy Lee MD  Discharge Provider:  Ashwin Main PA-C  Discharging Service: General Surgery     Primary Provider: Claudia Cordon  Primary Care Physician Phone Number: [phone]          Admission Diagnoses:   Principle Diagnosis: ILEOSTOMY  S/P closure of ileostomy  Secondary Diagnoses: COPD, Smoker, Nodule of DIANA of lung and S/P Lobectomy, Paranoid schizophrenia, Hypothyroidism, Dyslipidemia, Peripheral Neuropathy, and Urge Incontinence.          Discharge Diagnosis:   ILEOSTOMY          Procedures:   Procedure(s): Exploratory laparotomy, extensive lysis of intra-abdominal adhesions takedown of ileostomy with ileal colostomy, primary repair of recurrent incisional hernia            Discharge Medications:     Current Discharge Medication List      START taking these medications    Details   acetaminophen (TYLENOL) 325 MG tablet Take 2 tablets (650 mg) by mouth every 4 hours as needed for other (multimodal surgical pain management along with NSAIDS and opioid medication as indicated based on pain control and physical function.)  Qty: 30 tablet, Refills: 1    Associated Diagnoses: S/P closure of ileostomy      furosemide (LASIX) 20 MG tablet Take 1 tablet (20 mg) by mouth daily  Qty: 5 tablet, Refills: 0    Comments: Future refills by PCP Dr. Harinder Schultz with phone number [phone].  Associated Diagnoses: Hypervolemia, unspecified hypervolemia type      oxyCODONE (ROXICODONE) 5 MG tablet Take 1 tablet (5 mg) by mouth every 3 hours as needed for moderate to severe pain  Qty: 15 tablet, Refills: 0    Associated Diagnoses: S/P closure of ileostomy      senna-docusate (SENOKOT-S/PERICOLACE) 8.6-50 MG tablet Take 1 tablet by mouth 2 times daily Hold for frequent or loose  "stools  Qty: 30 tablet, Refills: 0    Associated Diagnoses: S/P closure of ileostomy         CONTINUE these medications which have NOT CHANGED    Details   aspirin 81 MG EC tablet Take 81 mg by mouth every morning      citalopram (CELEXA) 20 MG tablet Take 20 mg by mouth every evening       divalproex sodium delayed-release (DEPAKOTE) 500 MG DR tablet Take 500 mg by mouth At Bedtime (Dose cut into thirds- otherwise tablet goes straight to her colostomy bag)      gabapentin (NEURONTIN) 300 MG capsule Take 300 mg by mouth 2 times daily       lamoTRIgine (LAMICTAL) 200 MG tablet Take 100 mg by mouth daily (0.5 x 200 mg = 100 mg dose)       levothyroxine (SYNTHROID) 88 MCG tablet Take 88 mcg by mouth daily       lovastatin (MEVACOR) 40 MG tablet Take 40 mg by mouth At Bedtime       nicotine (COMMIT) 2 MG lozenge Place 2 mg inside cheek every hour as needed for smoking cessation      nicotine (NICORETTE) 2 MG gum Place 2 mg inside cheek every hour as needed for smoking cessation      oxybutynin ER (DITROPAN XL) 15 MG 24 hr tablet Take 15 mg by mouth every morning       QUEtiapine (SEROQUEL) 100 MG tablet Take 50 mg by mouth At Bedtime (0.5 x 100 mg = 50 mg dose)       thiothixene (NAVANE) 1 MG capsule Take 5 mg by mouth At Bedtime (takes 5 x 1mg tablet = 5mg dose)      Vitamin D, Cholecalciferol, 1000 units CAPS Take 1,000 Units by mouth daily                  Allergies:       No Known Allergies          Brief History of Illness:    Reason for your hospital stay      Ileostomy takedown with Dr. Lee         Reason for your hospital stay      Ileostomy takedown with post op ileus         From Dr. Lee's Operative Note:   \"This is a 58-year-old female with a complex past surgical history.  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  This required abdominal exploration " "with removal of the mesh and resection of small bowel.  This operation resulted in a anastomotic disruption and leak.  She ultimately then underwent ileostomy creation.  She has lived with this for many years.  She presented to my office desiring to have this reversed.  She was studied and found to have no evidence of distal obstruction.  We therefore elected to proceed with ileostomy takedown.  Risks, benefits and alternatives were thoroughly reviewed.  Her questions were answered and she consented to proceed.\"    After discussing the risks, benefits, and possible complications, informed consent was obtained and the patient underwent the above procedure.  There were no complications.  Please see the Operative Report for full details.           Hospital Course:   Santa Yousif's hospital course was remarkable for some post op N/V.  Potentially advanced diet too early vs post op ileus.  An NGT was replaced and left in until bowel function returned again, and then progressed as expected.  She had some hypoxia issues that we though may have been from perioperative fluid resuscitation.  This was treated with lasix, but with minimal improvement to her hypoxia.  Therefore, likely some baseline hypoxia with her COPD and DIANA lobectomy.  She was followed by the Hospitalist team for this.  Please see their consult and progress notes.      After she had ROBF and she diuresed, she recovered as anticipated and experienced no further post-operative complications.  PT/OT teams followed the patient while inhouse as she required considerable assistance getting out of bed even from POD1.  They followed the patient throughout her stay and recommended TCU for further rehabilitation.  Please see their consults and progress notes.    On the date of discharge, the patient was discharged to TCU in stable condition and afebrile, while on 2-3 Lpm of supplemental O2.  She verbalized understanding of all discharge instructions and felt " "comfortable with the discharge plan.  She was asked to call with any further questions or concerns.         Condition on Discharge:      Discharge condition: Stable   Discharge vitals: Blood pressure 107/63, pulse 63, temperature 97.5  F (36.4  C), temperature source Oral, resp. rate 16, height 1.626 m (5' 4\"), weight 84.7 kg (186 lb 11.2 oz), SpO2 92 %.           Discharge Disposition:   Discharged to rehabilitation facility          Discharge Instructions and Follow-Up:      Santa Yousif was asked to follow up with surgical team in 1-2 weeks.  Per Hospitalist, she should continue with Lasix and supplemental O2 until improved.  May take some time, and should follow up with PCP in next 2 weeks as well.      Ashwin Main PA-C  Dictating on behalf of Dr. Lee  General Surgery  [facility]  [phone]     "

## 2019-05-28 NOTE — PLAN OF CARE
Discharge Planner OT   Patient plan for discharge: home  Current status: Nursing removed B LE Lymphedema bandages, pt and nursing, noted decreased edema in B Le's, 2-3+edema B ankles/foot and 1-2+edema below knee. Therapist washed and applied lotion to B LE's and applied B LE gradient compression bandaging starting at base of B toes to below B knee crease with appropriate stretch and spacing, pt reports comfort with bandaging. Patient care order written in chart for nursing to now complete daily B LE lymphedema bandaging and verbalized to nursing and aware. Pt declined further OT at this time and educated in calling nurse prior to getting up. Pt agreed.   Barriers to return to prior living situation: Lives alone, B LE swelling, decreased activity tolerance, safety, balance, strength.   Recommendations for discharge: TCU, with cont'd B LE lymphedema wraps.  Rationale for recommendations: Pt making good progress, however, due to decreased safety and pt lives alone and requires Increased A with ADL./IADL's and functional mobility, pt will need daily therapy at a TCU to increase ADL and functional mobility independence and safety to PLOF and cont'd lymphedema B LE mgmt.         Entered by: Nicolette Reyes 05/28/2019 8:51 AM    Occupational Therapy Discharge Summary    Reason for therapy discharge:    Discharged to transitional care facility.    Progress towards therapy goal(s). See goals on Care Plan in [facility] electronic health record for goal details.  Goals not met.  Barriers to achieving goals:   discharge from facility.    Therapy recommendation(s):    Continued therapy is recommended.  Rationale/Recommendations:  OT at TCU to increase ADL and functional mobility I and safety to PLOF. .

## 2019-05-28 NOTE — PROGRESS NOTES
PAS-RR    D: Per DHS regulation, [facility] completed and submitted PAS-RR to [place] [employer] Direct Connect via the Senior LinkAge Line.  PAS-RR confirmation # is : [id number]      I: E[facility] spoke with pt and they are aware a PAS-RR has been submitted.  ENRICO reviewed with pt that they may be contacted for a follow up appointment within 10 days of hospital discharge if their SNF stay is < 30 days.  Contact information for [facility] LinkAge Line was also provided.    A: pt verbalized understanding.    P: Further questions may be directed to [facility] LinkAge Line at #[phone], option #4 for PAS-RR staff.    ENRICO  I: ENRICO was updated that patient would be ready for discharge. E[facility] called [facility] and updated admissions via . ENRICO will fax orders/PAS/scripts when complete.   ENRICO spoke with sister, Raegan, who will transport patient at 1430. RN will need to have patient ready and down to door 6. Sister will have a portable O2 tank for transport. Facility is updated.     Orders faxed

## 2019-06-06 ENCOUNTER — OFFICE VISIT (OUTPATIENT)
Dept: SURGERY | Facility: CLINIC | Age: 59
End: 2019-06-06
Payer: MEDICARE

## 2019-06-06 VITALS — HEART RATE: 66 BPM | SYSTOLIC BLOOD PRESSURE: 110 MMHG | DIASTOLIC BLOOD PRESSURE: 58 MMHG

## 2019-06-06 DIAGNOSIS — Z93.9 HISTORY OF CREATION OF OSTOMY (H): Primary | ICD-10-CM

## 2019-06-06 PROCEDURE — 99024 POSTOP FOLLOW-UP VISIT: CPT | Performed by: SURGERY

## 2019-06-09 RX ORDER — POLYETHYLENE GLYCOL 3350 17 G/17G
1 POWDER, FOR SOLUTION ORAL DAILY
Qty: 30 PACKET | Refills: 1 | Status: SHIPPED | OUTPATIENT
Start: 2019-06-09

## 2019-06-09 NOTE — PROGRESS NOTES
Patient presents in follow-up after recent hospitalization whereby she underwent laparotomy with extensive lysis of adhesions and takedown of ileostomy.  She has been recovering in a transitional care facility.  She seems to be making good progress.  She is struggling with some constipation and is using stool softeners.  She reports minimal ongoing abdominal pain.  She has had no fevers or chills.    On examination: Her incision is clean dry and intact.  Staples have been removed.  No evidence of incisional hernia.    Overall progressing nicely.  We discussed advancement of diet and activity.  I would like to see her back again in approximately 6 weeks.

## 2019-07-25 ENCOUNTER — OFFICE VISIT (OUTPATIENT)
Dept: SURGERY | Facility: CLINIC | Age: 59
End: 2019-07-25
Payer: MEDICARE

## 2019-07-25 VITALS
DIASTOLIC BLOOD PRESSURE: 66 MMHG | WEIGHT: 186 LBS | OXYGEN SATURATION: 94 % | RESPIRATION RATE: 16 BRPM | HEART RATE: 74 BPM | SYSTOLIC BLOOD PRESSURE: 112 MMHG | HEIGHT: 64 IN | BODY MASS INDEX: 31.76 KG/M2

## 2019-07-25 DIAGNOSIS — Z09 SURGICAL FOLLOWUP VISIT: Primary | ICD-10-CM

## 2019-07-25 PROCEDURE — 99024 POSTOP FOLLOW-UP VISIT: CPT | Performed by: SURGERY

## 2019-07-25 ASSESSMENT — MIFFLIN-ST. JEOR: SCORE: 1398.69

## 2019-07-25 NOTE — PROGRESS NOTES
Patient returns in no long-term follow-up after abdominal exploration with lysis of adhesions, ileostomy takedown and incisional hernia repair.  She reports that she is feeling well.  She has no further pain.  She is tolerating her diet without difficulty.  Bowel function has been normal.    On examination: Her abdomen is benign.  Her incision is well-healed.    Overall doing well.  May advance activities as comfortable.  No further dietary restrictions.  She may follow-up on an as-needed basis.    Please route or send letter to:  Primary Care Provider (PCP)

## 2019-09-25 ENCOUNTER — TRANSFERRED RECORDS (OUTPATIENT)
Dept: HEALTH INFORMATION MANAGEMENT | Facility: CLINIC | Age: 59
End: 2019-09-25

## 2019-10-11 ENCOUNTER — TRANSFERRED RECORDS (OUTPATIENT)
Dept: HEALTH INFORMATION MANAGEMENT | Facility: CLINIC | Age: 59
End: 2019-10-11

## 2019-10-30 ENCOUNTER — TRANSFERRED RECORDS (OUTPATIENT)
Dept: HEALTH INFORMATION MANAGEMENT | Facility: CLINIC | Age: 59
End: 2019-10-30

## 2019-10-30 LAB
ALT SERPL-CCNC: 8 U/L (ref 7–33)
AST SERPL-CCNC: 13 U/L (ref 9–37)
CHOLEST SERPL-MCNC: 181 MG/DL
CREAT SERPL-MCNC: 0.63 MG/DL (ref 0.5–0.9)
GFR SERPL CREATININE-BSD FRML MDRD: >60 ML/MIN/1.73M(2)
GLUCOSE SERPL-MCNC: 94 MG/DL (ref 70–100)
HDLC SERPL-MCNC: 49 MG/DL
LDLC SERPL CALC-MCNC: 105 MG/DL
POTASSIUM SERPL-SCNC: 4 MMOL/L (ref 3.5–5.1)
TRIGL SERPL-MCNC: 133 MG/DL
TSH SERPL-ACNC: 1.74 UIU/ML (ref 0.3–4.7)

## 2019-11-01 ENCOUNTER — MEDICAL CORRESPONDENCE (OUTPATIENT)
Dept: HEALTH INFORMATION MANAGEMENT | Facility: CLINIC | Age: 59
End: 2019-11-01

## 2019-11-01 ENCOUNTER — TRANSFERRED RECORDS (OUTPATIENT)
Dept: HEALTH INFORMATION MANAGEMENT | Facility: CLINIC | Age: 59
End: 2019-11-01

## 2019-11-18 ENCOUNTER — OFFICE VISIT (OUTPATIENT)
Dept: SURGERY | Facility: CLINIC | Age: 59
End: 2019-11-18
Payer: MEDICARE

## 2019-11-18 VITALS — DIASTOLIC BLOOD PRESSURE: 66 MMHG | SYSTOLIC BLOOD PRESSURE: 110 MMHG | HEART RATE: 64 BPM

## 2019-11-18 DIAGNOSIS — K80.20 CALCULUS OF GALLBLADDER WITHOUT CHOLECYSTITIS WITHOUT OBSTRUCTION: Primary | ICD-10-CM

## 2019-11-18 PROCEDURE — 99214 OFFICE O/P EST MOD 30 MIN: CPT | Performed by: SURGERY

## 2019-11-20 NOTE — PROGRESS NOTES
[facility]  Surgery Consultation    PCP:  Claudia Cordon [phone]    HPI: This patient is a 59-year-old female known to me from prior ileostomy takedown due to complex prior abdominal surgeries.  She is referred by the above-mentioned provider for consultation regarding cholelithiasis.  She has apparently been followed for some time due to a family history of ovarian cancer and a known left ovarian mass.  She had recent follow-up imaging of this as well as a follow-up on her CEA level.  This is increased slightly.  She was noted on imaging to have cholelithiasis.  She reports no significant symptoms to suggest biliary colic.  No right upper quadrant pain.  No postprandial pain no nausea or vomiting.  No fevers or chills.  No other change in her bowel habits.    PMH:   has a past medical history of Abnormal cytological finding in specimen from cervix uteri, Anemia, Aortic heart murmur, COPD (chronic obstructive pulmonary disease) (H), Diverticulitis, Enterocutaneous fistula, H/O ETOH abuse, H/O nicotine dependence, Hyperglycemia, Hyperlipidemia, Hypothyroidism, Ileostomy status (H), Iron malabsorption, Lung cancer (H), Macrocytosis, Paranoid schizophrenia (H), Peripheral neuropathy, Pleural effusion, Urge incontinence, and Urge incontinence.  PSH:    has a past surgical history that includes Thoracotomy (Left, 9/21/2018); Lobectomy lung (Left, 9/21/2018); hernia repair; biopsy; GI surgery; GI surgery; GI surgery; GI surgery; vascular surgery; Abdomen surgery; GYN surgery; and Takedown ileostomy (N/A, 5/15/2019).  Social History:   reports that she has been smoking cigarettes. She has been smoking about 0.50 packs per day. She has never used smokeless tobacco. She reports that she does not drink alcohol or use drugs.  Family History:  family history is not on file.  Medications/Allergies: Home medications and allergies reviewed.    ROS:  The 10 point Review of Systems is negative  other than noted in the HPI.    Physical Exam:  /66   Pulse 64   GENERAL: Generally appears well.  Psych: Alert and Oriented.  Normal affect  Eyes: Sclera clear  Respiratory:  Lungs clear to ausculation bilaterally with good air excursion  Cardiovascular:  Regular Rate and Rhythm with no murmurs gallops or rubs, normal peripheral pulses  GI: Abdomen Non Distended Non-Tender  No hernias palpated.  Lymphatic/Hematologic/Immune:  No femoral or cervical lymphadenopathy.  Integumentary:  No rashes  Neurological: grossly intact    Ultrasound shows Cholelithiasis No gall bladder wall thicking  No pericholecystic fluid   All new lab and imaging data was reviewed.     Impression and Plan:  Patient is a 59 year old female with a symptomatic cholelithiasis in the setting of possible left ovarian mass and family history of ovarian cancer.    PLAN:   I discussed the pathophysiology of gallbladder disease and complications of cholecystitis and choledocholithiasis with the patient.  Presently she has no symptoms to suggest biliary colic and as such I would be hesitant to proceed with cholecystectomy unless there was necessity for additional abdominal surgery.  I am concerned about these issues surrounding her ovary and has such have recommended that she be evaluated by Gyn oncology.  We will make a referral for her to see Dr. Katie Tobar at [facility] to discuss this further.  If it is deemed that she requires some manner of surgical intervention would give consideration to cholecystectomy concurrently.  Otherwise for now I would recommend ongoing observation until or if symptoms ever occur I also discussed the risks associated with the procedure including, but not limited to infection, bleeding, conversion to open, bile leak, bile duct injury, retained gallstones, pneumonia, MI, and anesthesia complications with the patient.  I also discussed if a complication did occur it may require further surgical intervention  during or after the procedure. The patient indicated understanding of the discussion, asked appropriate questions, and provided consent. I provided the patient an information pamphlet. I have recommended a low fat diet and instructed the patient to go to ER if they developed persistent pain, persistent nausea and vomiting, or yellowness of skin.      Thank you very much for this consult.    Rudy Lee M.D.  [facility]  [phone]    Please route or send letter to:  Primary Care Provider (PCP) and Referring Provider

## 2019-12-13 ENCOUNTER — TRANSFERRED RECORDS (OUTPATIENT)
Dept: HEALTH INFORMATION MANAGEMENT | Facility: CLINIC | Age: 59
End: 2019-12-13

## 2021-12-13 ENCOUNTER — TRANSFERRED RECORDS (OUTPATIENT)
Dept: HEALTH INFORMATION MANAGEMENT | Facility: CLINIC | Age: 61
End: 2021-12-13
Payer: MEDICARE

## 2022-02-26 NOTE — ANESTHESIA PREPROCEDURE EVALUATION
Anesthesia Evaluation     .             ROS/MED HX    ENT/Pulmonary: Comment: 30+ year pack history    (+)tobacco use, Current use 1 packs/day  COPD, , . .   (-) recent URI   Neurologic: Comment: Migraine occasional, not on meds    (+)migraines,     Cardiovascular:     (+) Dyslipidemia, ----. : . . . :. .       METS/Exercise Tolerance:     Hematologic:  - neg hematologic  ROS       Musculoskeletal:         GI/Hepatic:        (-) GERD and liver disease   Renal/Genitourinary:      (-) renal disease   Endo:     (+) thyroid problem .   (-) Type II DM and obesity   Psychiatric: Comment: Paranoid schizophrenia        Infectious Disease:         Malignancy:         Other:                                    Anesthesia Plan      History & Physical Review  History and physical reviewed and following examination; no interval change.    ASA Status:  3 .    NPO Status:  > 8 hours    Plan for General and ETT with Intravenous and Propofol induction. Maintenance will be Balanced.    PONV prophylaxis:  Ondansetron (or other 5HT-3) and Dexamethasone or Solumedrol  Additional equipment: Double Lumen ETT, Fiberoptic bronchoscope and 2nd IV Double lumen ETT, PIV x 2   Catheter for analgesia by surgeon at end of case      Postoperative Care  Postoperative pain management:  IV analgesics, Multi-modal analgesia and Peripheral nerve block (Continuous) (Catheters per surgeon).      Consents  Anesthetic plan, risks, benefits and alternatives discussed with:  Patient.  Use of blood products discussed: Yes.   Use of blood products discussed with Patient.  Consented to blood products.  .                          .   negative...

## 2022-06-13 ENCOUNTER — TRANSFERRED RECORDS (OUTPATIENT)
Dept: HEALTH INFORMATION MANAGEMENT | Facility: CLINIC | Age: 62
End: 2022-06-13

## 2022-06-13 ENCOUNTER — ANCILLARY PROCEDURE (OUTPATIENT)
Dept: CT IMAGING | Facility: CLINIC | Age: 62
End: 2022-06-13
Attending: THORACIC SURGERY (CARDIOTHORACIC VASCULAR SURGERY)
Payer: MEDICARE

## 2022-06-13 DIAGNOSIS — C34.90 NON-SMALL CELL LUNG CANCER, UNSPECIFIED LATERALITY (H): ICD-10-CM

## 2022-06-13 PROCEDURE — 71250 CT THORAX DX C-: CPT

## 2022-12-28 ENCOUNTER — ANCILLARY PROCEDURE (OUTPATIENT)
Dept: CT IMAGING | Facility: CLINIC | Age: 62
End: 2022-12-28
Attending: THORACIC SURGERY (CARDIOTHORACIC VASCULAR SURGERY)
Payer: MEDICARE

## 2022-12-28 DIAGNOSIS — C34.90 NON-SMALL CELL LUNG CANCER (H): ICD-10-CM

## 2022-12-28 PROCEDURE — 71250 CT THORAX DX C-: CPT

## 2023-06-28 ENCOUNTER — ANCILLARY PROCEDURE (OUTPATIENT)
Dept: CT IMAGING | Facility: CLINIC | Age: 63
End: 2023-06-28
Attending: THORACIC SURGERY (CARDIOTHORACIC VASCULAR SURGERY)
Payer: MEDICARE

## 2023-06-28 ENCOUNTER — TRANSFERRED RECORDS (OUTPATIENT)
Dept: HEALTH INFORMATION MANAGEMENT | Facility: CLINIC | Age: 63
End: 2023-06-28

## 2023-06-28 DIAGNOSIS — C34.90 NON-SMALL CELL LUNG CANCER (H): ICD-10-CM

## 2023-06-28 PROCEDURE — 71250 CT THORAX DX C-: CPT

## 2024-06-24 ENCOUNTER — TRANSFERRED RECORDS (OUTPATIENT)
Dept: HEALTH INFORMATION MANAGEMENT | Facility: CLINIC | Age: 64
End: 2024-06-24

## 2024-06-24 ENCOUNTER — ANCILLARY PROCEDURE (OUTPATIENT)
Dept: CT IMAGING | Facility: CLINIC | Age: 64
End: 2024-06-24
Attending: THORACIC SURGERY (CARDIOTHORACIC VASCULAR SURGERY)
Payer: MEDICARE

## 2024-06-24 DIAGNOSIS — C34.12 MALIGNANT NEOPLASM OF UPPER LOBE BRONCHUS, LEFT (H): ICD-10-CM

## 2024-06-24 PROCEDURE — 71250 CT THORAX DX C-: CPT

## 2025-07-16 ENCOUNTER — ANCILLARY PROCEDURE (OUTPATIENT)
Dept: CT IMAGING | Facility: CLINIC | Age: 65
End: 2025-07-16
Attending: THORACIC SURGERY (CARDIOTHORACIC VASCULAR SURGERY)
Payer: MEDICARE

## 2025-07-16 ENCOUNTER — TRANSFERRED RECORDS (OUTPATIENT)
Dept: HEALTH INFORMATION MANAGEMENT | Facility: CLINIC | Age: 65
End: 2025-07-16

## 2025-07-16 DIAGNOSIS — Z85.118 PERSONAL HISTORY OF LUNG CANCER: ICD-10-CM

## 2025-07-16 PROCEDURE — 71250 CT THORAX DX C-: CPT

## (undated) DEVICE — BLADE CLIPPER 4406

## (undated) DEVICE — SURGICEL HEMOSTAT 3X4" NUKNIT 1943

## (undated) DEVICE — CATH THORACIC 24FR STR SLICONE 14024

## (undated) DEVICE — SUCTION CANISTER MEDIVAC LINER 3000ML W/LID 65651-530

## (undated) DEVICE — SU PROLENE 3-0 V-7DA 36" 8976H

## (undated) DEVICE — SU VICRYL 2 TP-1 4X27" J649G

## (undated) DEVICE — STPL SKIN 35W ROTATING HEAD PRW35

## (undated) DEVICE — GLOVE PROTEXIS W/NEU-THERA 7.5  2D73TE75

## (undated) DEVICE — TUBE SMOKE EVAC 7/8"X10 (STERILE)

## (undated) DEVICE — SUCTION DRY CHEST DRAIN OASIS 3600-100

## (undated) DEVICE — PREP CHLORAPREP 26ML TINTED ORANGE  260815

## (undated) DEVICE — PACK MINOR SBA15MIFSE

## (undated) DEVICE — LINEN GOWN OVERSIZE 5408

## (undated) DEVICE — GLOVE PROTEXIS W/NEU-THERA 8.0  2D73TE80

## (undated) DEVICE — ESU PENCIL W/SMOKE EVAC NEPTUNE STRYKER 0703-046-000

## (undated) DEVICE — SOL WATER IRRIG 1000ML BOTTLE 2F7114

## (undated) DEVICE — ENDO TROCAR THORACIC 10/12MM TT012

## (undated) DEVICE — DRAPE POUCH INSTRUMENT 1018

## (undated) DEVICE — SOL NACL 0.9% IRRIG 1000ML BOTTLE 07138-09

## (undated) DEVICE — SOL NACL 0.9% IRRIG 1000ML BOTTLE 2F7124

## (undated) DEVICE — SU VICRYL 1 CT 36" J959H

## (undated) DEVICE — DRSG KERLIX 4 1/2"X4YDS ROLL 6715

## (undated) DEVICE — DRAPE BREAST/CHEST 29420

## (undated) DEVICE — SU SILK 2-0 TIE 24" SA75H

## (undated) DEVICE — DRSG TEGADERM 4X4 3/4" 1626

## (undated) DEVICE — DRAIN PENROSE 0.75"X18" LATEX FREE GR205

## (undated) DEVICE — STPL LINEAR CUT 75MM TLC75

## (undated) DEVICE — Device

## (undated) DEVICE — TUBING SUCTION MEDI-VAC SOFT 3/16"X20' N520A

## (undated) DEVICE — ESU GROUND PAD UNIVERSAL W/O CORD

## (undated) DEVICE — ADPT 5 IN 1 360

## (undated) DEVICE — BLADE KNIFE SURG 10 371110

## (undated) DEVICE — TAPE DRSG UNIVERSAL CLOTH 3" WHITE LATEX 881-3

## (undated) DEVICE — SPONGE LAP 18X18" X8435

## (undated) DEVICE — ESU ELEC BLADE 6" COATED/INSULATED E1455-6

## (undated) DEVICE — LINEN TOWEL PACK X5 5464

## (undated) DEVICE — TUBING SUCTION 12"X1/4" N612

## (undated) DEVICE — GLOVE PROTEXIS W/NEU-THERA 6.5  2D73TE65

## (undated) DEVICE — CLIP APPLIER 11" MED LIGACLIP MCM20

## (undated) DEVICE — SU VICRYL 3-0 SH CR 8X18" J774

## (undated) DEVICE — DRSG TELFA ISLAND 4X14" 7544

## (undated) DEVICE — GOWN IMPERVIOUS SPECIALTY XLG/XLONG 32474

## (undated) DEVICE — SU NDL CUT REV MED 3/8 209014

## (undated) DEVICE — STPL ENDO ARTICULATING 45MM EC45A

## (undated) DEVICE — DRSG STERI STRIP 1/2X4" R1547

## (undated) DEVICE — SU SILK 2 REEL 60" SA8H

## (undated) DEVICE — SU VICRYL 2-0 CT-1 27" J339H

## (undated) DEVICE — DRAPE IOBAN INCISE 23X17" 6650EZ

## (undated) DEVICE — SYR BULB IRRIG 50ML LATEX FREE 0035280

## (undated) DEVICE — SU SILK 3-0 TIE 24" SA74H

## (undated) DEVICE — BARRIER SEPRAFILM 3X5" X6 SHEETS 5086-02

## (undated) DEVICE — DRSG GAUZE 4X4" 3033

## (undated) DEVICE — NDL 22GA 1.5"

## (undated) DEVICE — PACK LAP CHOLE SLC15LCFSD

## (undated) DEVICE — GOWN IMPERVIOUS ZONED LG

## (undated) DEVICE — SU VICRYL 1 CTX CR 8X18" J765D

## (undated) DEVICE — STPL POWERED ECHELON VASC 35MM PVE35A

## (undated) DEVICE — SU VICRYL 3-0 SH 27" J316H

## (undated) DEVICE — SU PROLENE 4-0 V-7DA 36" 8975H

## (undated) DEVICE — STPL RELOAD LINEAR CUT 75MM TCR75

## (undated) DEVICE — CATH ON-Q PAIN SILVER SKR 2.5" PM010-A

## (undated) DEVICE — BLADE KNIFE SURG 15 371115

## (undated) DEVICE — STPL RELOAD REG/THK TISSUE ECHELON 45 X 3.8MM GOLD GST45D

## (undated) RX ORDER — PROPOFOL 10 MG/ML
INJECTION, EMULSION INTRAVENOUS
Status: DISPENSED
Start: 2018-09-21

## (undated) RX ORDER — HYDROMORPHONE HYDROCHLORIDE 1 MG/ML
INJECTION, SOLUTION INTRAMUSCULAR; INTRAVENOUS; SUBCUTANEOUS
Status: DISPENSED
Start: 2018-09-21

## (undated) RX ORDER — HYDROMORPHONE HYDROCHLORIDE 1 MG/ML
INJECTION, SOLUTION INTRAMUSCULAR; INTRAVENOUS; SUBCUTANEOUS
Status: DISPENSED
Start: 2019-05-15

## (undated) RX ORDER — CEFAZOLIN SODIUM 2 G/100ML
INJECTION, SOLUTION INTRAVENOUS
Status: DISPENSED
Start: 2018-09-21

## (undated) RX ORDER — DEXAMETHASONE SODIUM PHOSPHATE 4 MG/ML
INJECTION, SOLUTION INTRA-ARTICULAR; INTRALESIONAL; INTRAMUSCULAR; INTRAVENOUS; SOFT TISSUE
Status: DISPENSED
Start: 2018-09-21

## (undated) RX ORDER — ERTAPENEM 1 G/1
INJECTION, POWDER, LYOPHILIZED, FOR SOLUTION INTRAMUSCULAR; INTRAVENOUS
Status: DISPENSED
Start: 2019-05-15

## (undated) RX ORDER — GLYCOPYRROLATE 0.2 MG/ML
INJECTION, SOLUTION INTRAMUSCULAR; INTRAVENOUS
Status: DISPENSED
Start: 2018-09-21

## (undated) RX ORDER — FENTANYL CITRATE 50 UG/ML
INJECTION, SOLUTION INTRAMUSCULAR; INTRAVENOUS
Status: DISPENSED
Start: 2018-09-21

## (undated) RX ORDER — ONDANSETRON 2 MG/ML
INJECTION INTRAMUSCULAR; INTRAVENOUS
Status: DISPENSED
Start: 2018-09-21

## (undated) RX ORDER — FENTANYL CITRATE 50 UG/ML
INJECTION, SOLUTION INTRAMUSCULAR; INTRAVENOUS
Status: DISPENSED
Start: 2019-05-15

## (undated) RX ORDER — PROPOFOL 10 MG/ML
INJECTION, EMULSION INTRAVENOUS
Status: DISPENSED
Start: 2019-05-15

## (undated) RX ORDER — LIDOCAINE HYDROCHLORIDE 20 MG/ML
INJECTION, SOLUTION EPIDURAL; INFILTRATION; INTRACAUDAL; PERINEURAL
Status: DISPENSED
Start: 2018-09-21

## (undated) RX ORDER — VECURONIUM BROMIDE 1 MG/ML
INJECTION, POWDER, LYOPHILIZED, FOR SOLUTION INTRAVENOUS
Status: DISPENSED
Start: 2019-05-15

## (undated) RX ORDER — ACETAMINOPHEN 325 MG/1
TABLET ORAL
Status: DISPENSED
Start: 2019-05-15

## (undated) RX ORDER — VECURONIUM BROMIDE 1 MG/ML
INJECTION, POWDER, LYOPHILIZED, FOR SOLUTION INTRAVENOUS
Status: DISPENSED
Start: 2018-09-21

## (undated) RX ORDER — ALBUTEROL SULFATE 90 UG/1
AEROSOL, METERED RESPIRATORY (INHALATION)
Status: DISPENSED
Start: 2018-09-21

## (undated) RX ORDER — ALBUTEROL SULFATE 90 UG/1
AEROSOL, METERED RESPIRATORY (INHALATION)
Status: DISPENSED
Start: 2019-05-15

## (undated) RX ORDER — BUPIVACAINE HYDROCHLORIDE AND EPINEPHRINE 2.5; 5 MG/ML; UG/ML
INJECTION, SOLUTION EPIDURAL; INFILTRATION; INTRACAUDAL; PERINEURAL
Status: DISPENSED
Start: 2019-05-15